# Patient Record
Sex: FEMALE | Race: WHITE | Employment: OTHER | URBAN - NONMETROPOLITAN AREA
[De-identification: names, ages, dates, MRNs, and addresses within clinical notes are randomized per-mention and may not be internally consistent; named-entity substitution may affect disease eponyms.]

---

## 2017-02-03 PROBLEM — M51.379 DEGENERATION OF LUMBAR OR LUMBOSACRAL INTERVERTEBRAL DISC: Chronic | Status: ACTIVE | Noted: 2017-02-03

## 2017-02-03 PROBLEM — M51.37 DEGENERATION OF LUMBAR OR LUMBOSACRAL INTERVERTEBRAL DISC: Chronic | Status: ACTIVE | Noted: 2017-02-03

## 2017-02-03 PROBLEM — M48.00 SPINAL STENOSIS: Chronic | Status: ACTIVE | Noted: 2017-02-03

## 2019-02-18 LAB
CREATININE: 1.2 MG/DL
POTASSIUM (K+): 4.5

## 2019-04-25 LAB
CHOLESTEROL, TOTAL: 181 MG/DL
CHOLESTEROL, TOTAL: 181 MG/DL
CHOLESTEROL/HDL RATIO: 4.3
CHOLESTEROL/HDL RATIO: 4.3
HDLC SERPL-MCNC: 42 MG/DL (ref 35–70)
HDLC SERPL-MCNC: 42 MG/DL (ref 35–70)
LDL CHOLESTEROL CALCULATED: 111 MG/DL (ref 0–160)
LDL CHOLESTEROL CALCULATED: 111 MG/DL (ref 0–160)
TRIGL SERPL-MCNC: 166 MG/DL
TRIGL SERPL-MCNC: 166 MG/DL
VLDLC SERPL CALC-MCNC: NORMAL MG/DL
VLDLC SERPL CALC-MCNC: NORMAL MG/DL

## 2019-05-15 ENCOUNTER — TELEPHONE (OUTPATIENT)
Dept: PRIMARY CARE CLINIC | Age: 74
End: 2019-05-15

## 2019-05-19 PROBLEM — J44.9 CHRONIC OBSTRUCTIVE LUNG DISEASE (HCC): Status: ACTIVE | Noted: 2018-07-09

## 2019-05-19 PROBLEM — E04.1 THYROID NODULE: Status: ACTIVE | Noted: 2019-05-19

## 2019-05-19 PROBLEM — M81.0 OSTEOPOROSIS: Status: ACTIVE | Noted: 2019-05-19

## 2019-05-19 PROBLEM — I10 HYPERTENSION: Status: ACTIVE | Noted: 2017-10-09

## 2019-05-19 PROBLEM — K21.9 GASTROESOPHAGEAL REFLUX DISEASE: Status: ACTIVE | Noted: 2019-04-25

## 2019-05-19 PROBLEM — C55 UTERINE CANCER (HCC): Status: ACTIVE | Noted: 2019-05-19

## 2019-05-19 RX ORDER — MIRTAZAPINE 15 MG/1
TABLET, FILM COATED ORAL
Refills: 0 | COMMUNITY
Start: 2019-03-19 | End: 2019-05-19

## 2019-05-19 RX ORDER — ASPIRIN 81 MG/1
TABLET ORAL
COMMUNITY

## 2019-05-19 RX ORDER — TRIHEXYPHENIDYL HYDROCHLORIDE 2 MG/1
TABLET ORAL
Refills: 2 | COMMUNITY
Start: 2019-03-18 | End: 2019-06-19 | Stop reason: ALTCHOICE

## 2019-05-19 RX ORDER — MISOPROSTOL 100 UG/1
TABLET ORAL
COMMUNITY
Start: 2018-03-07 | End: 2019-05-20

## 2019-05-19 NOTE — PROGRESS NOTES
2019    Name: Nichole Kohler : 1945 Sex: female  Age: 74 y.o. Subjective:  Chief Complaint   Patient presents with    Shortness of Breath    Injections     Prolia - unsure of last injection date. HPI: This 74y.o. -year-old female  presents today for evaluation and management of her  chronic medical problems. Current medication list reviewed. The patient is tolerating all medications well without adverse events or known side effects. The patient does understand the risk and benefits of the prescribed medications. She needs her Prolia shot she has a history of osteoporosis Needs her bone density scan after 2019. She sees Dr. Timothy Daniels her oncologist. Reviewed his note from earlier this year. She was treated for pneumonia in 2019. Chest x-ray revealed right middle lobe infiltrate. . Also referred to Dr. Selvin Ayala. Repeat chest x-ray on 5/15/2019 showed resolution of infiltrates but she had scarring and hyperinflation consistent with COPD. She says she is very short of breath when she gets up in the morning. She is afraid to fall sleep at night because she does note she could deal with the shortness of breath. She is on no inhalers. She did pulmonary function tests in the past but it has been a while n. She is not on any inhalers. She was told at one time that she had obstructive sleep apnea and was given a CPAP machine but was unable to tolerate the headgear and refuses to use it. She was told also told that her oxygen level was low at one time but she did not want to have a large oxygen tank at home. We'll need to get an overnight pulse oximetry test on her. EKG was found in the ED, questionable old anterior septal MI. No previous history of an old MI. Consider cardiology evaluation. We'll discuss this when she returns. O2 saturation room air is 93%     History of chronic pain under care of pain management.   history of depression under care of psychiatrist. She prescribes all her depression medications. Review of Systems       Current Outpatient Medications:     mirtazapine (REMERON) 15 MG tablet, Take 15 mg by mouth nightly, Disp: , Rfl:     lisinopril (PRINIVIL;ZESTRIL) 20 MG tablet, Take 1 tablet by mouth daily, Disp: 30 tablet, Rfl: 3    simvastatin (ZOCOR) 20 MG tablet, Take 0.5 tablets by mouth nightly, Disp: 30 tablet, Rfl: 5    vitamin D (ERGOCALCIFEROL) 14196 units CAPS capsule, Take 1 capsule by mouth once a week, Disp: 5 capsule, Rfl: 3    albuterol sulfate  (90 Base) MCG/ACT inhaler, Inhale 2 puffs into the lungs 4 times daily as needed for Wheezing, Disp: 3 Inhaler, Rfl: 1    Spacer/Aero-Holding Chambers (AEROCHAMBER MV) MISC, Use with your albuterol inhaler, Disp: 1 each, Rfl: 0    aspirin (ASPIRIN ADULT LOW DOSE) 81 MG EC tablet, , Disp: , Rfl:     trihexyphenidyl (ARTANE) 2 MG tablet, TK 1 T PO D, Disp: , Rfl: 2    HYDROcodone-acetaminophen (NORCO)  MG per tablet, Take 1 tablet by mouth every 8 hours as needed for Pain ., Disp: , Rfl:     escitalopram (LEXAPRO) 20 MG tablet, Take 20 mg by mouth 2 times daily , Disp: , Rfl:     denosumab (PROLIA) 60 MG/ML SOLN SC injection, Inject 60 mg into the skin once Every 6 months, Disp: , Rfl:     anastrozole (ARIMIDEX) 1 MG tablet, Take 1 mg by mouth nightly , Disp: , Rfl:     LORazepam (ATIVAN) 1 MG tablet, Take 1 mg by mouth 2 times daily. , Disp: , Rfl:      Allergies   Allergen Reactions    Epinephrine Other (See Comments)     PATIENT SATES \"HEART PALPATATIONS. \"     Adhesive Tape Rash        Past Medical History:   Diagnosis Date    Cancer ([facility] [address]      uterus    Cancer ([facility] [address]     breast bilateral    Chronic pain     Depression     GERD (gastroesophageal reflux disease)     Hyperlipidemia     Hypertension     Osteoarthritis     Osteoporosis     Palpitations     Sleep apnea     doesnt use her cpap       Health Maintenance Due   Topic Date Due    DTaP/Tdap/Td vaccine (1 - Tdap) normal.   Left Ear: External ear normal.   Nose: Nose normal.   Mouth/Throat: Oropharynx is clear and moist. No oropharyngeal exudate. Eyes: Pupils are equal, round, and reactive to light. Conjunctivae and EOM are normal. Right eye exhibits no discharge. Left eye exhibits no discharge. No scleral icterus. Neck: Normal range of motion. Neck supple. No thyromegaly present. Cardiovascular: Normal rate, regular rhythm, normal heart sounds and intact distal pulses. Exam reveals no gallop and no friction rub. No murmur heard. Pulmonary/Chest: Effort normal. No respiratory distress. She has decreased breath sounds. She has no wheezes. She has no rales. She exhibits no tenderness. Abdominal: Soft. Bowel sounds are normal. She exhibits no distension and no mass. There is no tenderness. There is no rebound and no guarding. Musculoskeletal: Normal range of motion. She exhibits no edema, tenderness or deformity. Lymphadenopathy:     She has no cervical adenopathy. Neurological: She is alert and oriented to person, place, and time. She displays normal reflexes. No cranial nerve deficit or sensory deficit. Skin: Skin is warm and dry. No rash noted. No erythema. No pallor. Psychiatric:   Extremely anxious        Last labs reviewed.       ASSESSMENT & PLAN :   Problem List        Circulatory    Hypertension    Relevant Medications    aspirin (ASPIRIN ADULT LOW DOSE) 81 MG EC tablet    lisinopril (PRINIVIL;ZESTRIL) 20 MG tablet    simvastatin (ZOCOR) 20 MG tablet       Respiratory    Chronic obstructive lung disease (HCC)     Pulmonary function tests, Proair hfa prn sob, use aerochamber  Overnight pulse oximetry test         Relevant Medications    albuterol sulfate  (90 Base) MCG/ACT inhaler    Other Relevant Orders    Stress Test, Pulmonary       Musculoskeletal and Integument    Osteoporosis - Primary    Relevant Medications    vitamin D (ERGOCALCIFEROL) 94040 units CAPS capsule       Other Depression    Relevant Medications    escitalopram (LEXAPRO) 20 MG tablet    LORazepam (ATIVAN) 1 MG tablet    mirtazapine (REMERON) 15 MG tablet    Chronic pain    Relevant Medications    escitalopram (LEXAPRO) 20 MG tablet    HYDROcodone-acetaminophen (NORCO)  MG per tablet    aspirin (ASPIRIN ADULT LOW DOSE) 81 MG EC tablet    mirtazapine (REMERON) 15 MG tablet    Hyperlipidemia    Relevant Medications    aspirin (ASPIRIN ADULT LOW DOSE) 81 MG EC tablet    lisinopril (PRINIVIL;ZESTRIL) 20 MG tablet    simvastatin (ZOCOR) 20 MG tablet           Return in about 1 week (around 5/27/2019).        Aline Gilford, DO  5/20/2019

## 2019-05-20 ENCOUNTER — OFFICE VISIT (OUTPATIENT)
Dept: PRIMARY CARE CLINIC | Age: 74
End: 2019-05-20
Payer: MEDICARE

## 2019-05-20 VITALS
HEART RATE: 103 BPM | DIASTOLIC BLOOD PRESSURE: 84 MMHG | BODY MASS INDEX: 25.49 KG/M2 | TEMPERATURE: 98.4 F | SYSTOLIC BLOOD PRESSURE: 128 MMHG | RESPIRATION RATE: 24 BRPM | HEIGHT: 65 IN | WEIGHT: 153 LBS | OXYGEN SATURATION: 93 %

## 2019-05-20 DIAGNOSIS — I10 ESSENTIAL HYPERTENSION: ICD-10-CM

## 2019-05-20 DIAGNOSIS — G47.30 SLEEP APNEA, UNSPECIFIED TYPE: ICD-10-CM

## 2019-05-20 DIAGNOSIS — J44.9 CHRONIC OBSTRUCTIVE PULMONARY DISEASE, UNSPECIFIED COPD TYPE (HCC): ICD-10-CM

## 2019-05-20 DIAGNOSIS — M81.0 OSTEOPOROSIS WITHOUT CURRENT PATHOLOGICAL FRACTURE, UNSPECIFIED OSTEOPOROSIS TYPE: Primary | ICD-10-CM

## 2019-05-20 DIAGNOSIS — F32.9 MAJOR DEPRESSIVE DISORDER, REMISSION STATUS UNSPECIFIED, UNSPECIFIED WHETHER RECURRENT: ICD-10-CM

## 2019-05-20 DIAGNOSIS — G89.4 CHRONIC PAIN SYNDROME: ICD-10-CM

## 2019-05-20 DIAGNOSIS — E78.2 MIXED HYPERLIPIDEMIA: ICD-10-CM

## 2019-05-20 DIAGNOSIS — F32.A DEPRESSION, UNSPECIFIED DEPRESSION TYPE: ICD-10-CM

## 2019-05-20 PROCEDURE — 96372 THER/PROPH/DIAG INJ SC/IM: CPT | Performed by: INTERNAL MEDICINE

## 2019-05-20 PROCEDURE — 99214 OFFICE O/P EST MOD 30 MIN: CPT | Performed by: INTERNAL MEDICINE

## 2019-05-20 RX ORDER — LISINOPRIL 20 MG/1
20 TABLET ORAL DAILY
Qty: 30 TABLET | Refills: 3 | Status: SHIPPED | OUTPATIENT
Start: 2019-05-20 | End: 2019-10-23 | Stop reason: SDUPTHER

## 2019-05-20 RX ORDER — SIMVASTATIN 20 MG
10 TABLET ORAL NIGHTLY
Qty: 30 TABLET | Refills: 5 | Status: SHIPPED | OUTPATIENT
Start: 2019-05-20 | End: 2019-06-06 | Stop reason: DRUGHIGH

## 2019-05-20 RX ORDER — ERGOCALCIFEROL 1.25 MG/1
50000 CAPSULE ORAL WEEKLY
Qty: 5 CAPSULE | Refills: 3 | Status: SHIPPED | OUTPATIENT
Start: 2019-05-20 | End: 2019-09-17 | Stop reason: DRUGHIGH

## 2019-05-20 RX ORDER — INHALER, ASSIST DEVICES
SPACER (EA) MISCELLANEOUS
Qty: 1 EACH | Refills: 0 | Status: SHIPPED | OUTPATIENT
Start: 2019-05-20 | End: 2019-10-23

## 2019-05-20 RX ORDER — ALBUTEROL SULFATE 90 UG/1
2 AEROSOL, METERED RESPIRATORY (INHALATION) 4 TIMES DAILY PRN
Qty: 3 INHALER | Refills: 1 | Status: SHIPPED
Start: 2019-05-20 | End: 2020-02-27

## 2019-05-20 RX ORDER — MIRTAZAPINE 15 MG/1
15 TABLET, FILM COATED ORAL NIGHTLY
COMMUNITY
End: 2019-06-19 | Stop reason: ALTCHOICE

## 2019-05-20 NOTE — ASSESSMENT & PLAN NOTE
Probably given today and will return in 6 months for another injection. DEXA scan due in August 2019.

## 2019-05-20 NOTE — ASSESSMENT & PLAN NOTE
Blood pressures are stable. Continue medications and monitor blood pressures at home. Call office if systolics are over 470 over diastolics over 90.

## 2019-05-21 ENCOUNTER — TELEPHONE (OUTPATIENT)
Dept: PRIMARY CARE CLINIC | Age: 74
End: 2019-05-21

## 2019-05-21 NOTE — TELEPHONE ENCOUNTER
Patient is not on oxygen. I would like an overnight pulse oximeter on room air to see if she desaturates during the night.

## 2019-05-21 NOTE — TELEPHONE ENCOUNTER
[facility] CALLED- THEY WANT CLARIFICATION ON ORDERS FOR OXYGEN.  SHE NEED TO KNOW IF SHE IS ON OXYGEN THERAPY ALREADY AND WHAT FLOW YOU WOUKD LIKE HER ON, CONTINUOUS??

## 2019-05-22 ENCOUNTER — TELEPHONE (OUTPATIENT)
Dept: PRIMARY CARE CLINIC | Age: 74
End: 2019-05-22

## 2019-05-22 NOTE — TELEPHONE ENCOUNTER
Dr Walter Sanford,  I need you to order a pulmonary function test for this patient. (The pulmonary stress test is incorrect.)  Also, [facility] does not do the pulse oximetry overnight tests. Do you know of who does these?

## 2019-05-23 ENCOUNTER — TELEPHONE (OUTPATIENT)
Dept: PRIMARY CARE CLINIC | Age: 74
End: 2019-05-23

## 2019-05-23 DIAGNOSIS — R06.09 DYSPNEA ON EXERTION: Primary | ICD-10-CM

## 2019-05-23 NOTE — TELEPHONE ENCOUNTER
[facility] scheduling called and the stress test ordered says pulmonary, they are wondering if it is for a PFT or just a standard walking treadmill. They will need new order with corrected orders.

## 2019-06-06 ENCOUNTER — OFFICE VISIT (OUTPATIENT)
Dept: PRIMARY CARE CLINIC | Age: 74
End: 2019-06-06
Payer: MEDICARE

## 2019-06-06 VITALS
WEIGHT: 139 LBS | SYSTOLIC BLOOD PRESSURE: 110 MMHG | DIASTOLIC BLOOD PRESSURE: 62 MMHG | HEART RATE: 73 BPM | BODY MASS INDEX: 23.16 KG/M2 | HEIGHT: 65 IN | TEMPERATURE: 97.3 F | OXYGEN SATURATION: 97 %

## 2019-06-06 DIAGNOSIS — Z78.0 POST-MENOPAUSAL: Primary | ICD-10-CM

## 2019-06-06 DIAGNOSIS — F32.1 CURRENT MODERATE EPISODE OF MAJOR DEPRESSIVE DISORDER, UNSPECIFIED WHETHER RECURRENT (HCC): ICD-10-CM

## 2019-06-06 DIAGNOSIS — J96.11 CHRONIC RESPIRATORY FAILURE WITH HYPOXIA (HCC): ICD-10-CM

## 2019-06-06 PROCEDURE — 99214 OFFICE O/P EST MOD 30 MIN: CPT | Performed by: INTERNAL MEDICINE

## 2019-06-06 PROCEDURE — G0444 DEPRESSION SCREEN ANNUAL: HCPCS | Performed by: INTERNAL MEDICINE

## 2019-06-06 RX ORDER — SIMVASTATIN 10 MG
10 TABLET ORAL NIGHTLY
Qty: 90 TABLET | Refills: 1 | Status: SHIPPED | OUTPATIENT
Start: 2019-06-06 | End: 2019-06-06 | Stop reason: SDUPTHER

## 2019-06-06 RX ORDER — SIMVASTATIN 10 MG
10 TABLET ORAL NIGHTLY
Qty: 90 TABLET | Refills: 1 | Status: SHIPPED | OUTPATIENT
Start: 2019-06-06 | End: 2019-06-06

## 2019-06-06 RX ORDER — SIMVASTATIN 10 MG
10 TABLET ORAL NIGHTLY
Qty: 90 TABLET | Refills: 1 | Status: SHIPPED | OUTPATIENT
Start: 2019-06-06 | End: 2019-06-19 | Stop reason: SDUPTHER

## 2019-06-06 ASSESSMENT — PATIENT HEALTH QUESTIONNAIRE - PHQ9
10. IF YOU CHECKED OFF ANY PROBLEMS, HOW DIFFICULT HAVE THESE PROBLEMS MADE IT FOR YOU TO DO YOUR WORK, TAKE CARE OF THINGS AT HOME, OR GET ALONG WITH OTHER PEOPLE: 3
SUM OF ALL RESPONSES TO PHQ QUESTIONS 1-9: 19
4. FEELING TIRED OR HAVING LITTLE ENERGY: 3
SUM OF ALL RESPONSES TO PHQ9 QUESTIONS 1 & 2: 4
2. FEELING DOWN, DEPRESSED OR HOPELESS: 3
7. TROUBLE CONCENTRATING ON THINGS, SUCH AS READING THE NEWSPAPER OR WATCHING TELEVISION: 3
8. MOVING OR SPEAKING SO SLOWLY THAT OTHER PEOPLE COULD HAVE NOTICED. OR THE OPPOSITE, BEING SO FIGETY OR RESTLESS THAT YOU HAVE BEEN MOVING AROUND A LOT MORE THAN USUAL: 3
SUM OF ALL RESPONSES TO PHQ QUESTIONS 1-9: 19
3. TROUBLE FALLING OR STAYING ASLEEP: 0
1. LITTLE INTEREST OR PLEASURE IN DOING THINGS: 1
5. POOR APPETITE OR OVEREATING: 3
6. FEELING BAD ABOUT YOURSELF - OR THAT YOU ARE A FAILURE OR HAVE LET YOURSELF OR YOUR FAMILY DOWN: 3
9. THOUGHTS THAT YOU WOULD BE BETTER OFF DEAD, OR OF HURTING YOURSELF: 0

## 2019-06-06 ASSESSMENT — ENCOUNTER SYMPTOMS
ANAL BLEEDING: 0
ABDOMINAL PAIN: 0
COUGH: 0
SHORTNESS OF BREATH: 1
SORE THROAT: 0
EYE DISCHARGE: 0
BLOOD IN STOOL: 0
CONSTIPATION: 0
DIARRHEA: 0
WHEEZING: 0
RHINORRHEA: 0
STRIDOR: 0
EYE ITCHING: 0
VOMITING: 0
PHOTOPHOBIA: 0
FACIAL SWELLING: 0
COLOR CHANGE: 0
EYE PAIN: 0
TROUBLE SWALLOWING: 0
NAUSEA: 0

## 2019-06-06 NOTE — PROGRESS NOTES
6/6/19    Name: Vanna Dumont     [facility]:[id number]      Sex:female       Age : 74 y.o. Chief Complaint   Patient presents with    Osteoporosis     6 week F/U         HPI     Vahid Snowden is here accompanied by her . Her  states that her depression has worsened. She refuses to follow instructions. We talked about getting home oxygen for nighttime use and when necessary during the day but she is adamant that she does not want a large tank and she wants an [employer] device. She could not seem to understand that this runs in the batteries this is mainly used for outside trips but not for nighttime use. . She has absolutely refused to get the sleep apnea study at an outside facility so we recommended that she do a home sleep study. Her . Says she is getting much more agitated. Saw psychiatrist recently but no changes were made in her medications. They were told to \"pray over this\"  I asked her if she had any thoughts of suicide and she said that she did however she had no plans. I strongly recommend that she be admitted to the [facility] psych unit for further evaluation and treatment. Her  is to take her immediately to [facility] emergency department. .    Review of Systems   Constitutional: Negative for appetite change, fatigue and unexpected weight change. HENT: Negative for congestion, ear pain, facial swelling, rhinorrhea, sore throat, tinnitus and trouble swallowing. Eyes: Negative for photophobia, pain, discharge, itching and visual disturbance. Respiratory: Positive for shortness of breath. Negative for cough, wheezing and stridor. Cardiovascular: Negative for chest pain, palpitations and leg swelling. Gastrointestinal: Negative for abdominal pain, anal bleeding, blood in stool, constipation, diarrhea, nausea and vomiting. Endocrine: Negative for cold intolerance, heat intolerance, polydipsia, polyphagia and polyuria.    Genitourinary: Negative for difficulty urinating, dysuria, flank pain, frequency, hematuria and urgency. Musculoskeletal: Negative for arthralgias, gait problem, joint swelling and myalgias. Skin: Negative for color change, pallor and rash. Allergic/Immunologic: Negative for environmental allergies and food allergies. Neurological: Negative for dizziness, tremors, seizures, syncope, speech difficulty, weakness, light-headedness, numbness and headaches. Hematological: Negative for adenopathy. Does not bruise/bleed easily. Psychiatric/Behavioral: Positive for agitation, behavioral problems, decreased concentration, sleep disturbance and suicidal ideas. Negative for confusion.           Current Outpatient Medications:     simvastatin (ZOCOR) 10 MG tablet, Take 1 tablet by mouth nightly, Disp: 90 tablet, Rfl: 1    mirtazapine (REMERON) 15 MG tablet, Take 15 mg by mouth nightly, Disp: , Rfl:     lisinopril (PRINIVIL;ZESTRIL) 20 MG tablet, Take 1 tablet by mouth daily, Disp: 30 tablet, Rfl: 3    vitamin D (ERGOCALCIFEROL) 26155 units CAPS capsule, Take 1 capsule by mouth once a week, Disp: 5 capsule, Rfl: 3    albuterol sulfate  (90 Base) MCG/ACT inhaler, Inhale 2 puffs into the lungs 4 times daily as needed for Wheezing, Disp: 3 Inhaler, Rfl: 1    Spacer/Aero-Holding Chambers (AEROCHAMBER MV) MISC, Use with your albuterol inhaler, Disp: 1 each, Rfl: 0    aspirin (ASPIRIN ADULT LOW DOSE) 81 MG EC tablet, , Disp: , Rfl:     trihexyphenidyl (ARTANE) 2 MG tablet, TK 1 T PO D, Disp: , Rfl: 2    HYDROcodone-acetaminophen (NORCO)  MG per tablet, Take 1 tablet by mouth every 8 hours as needed for Pain ., Disp: , Rfl:     escitalopram (LEXAPRO) 20 MG tablet, Take 20 mg by mouth 2 times daily , Disp: , Rfl:     denosumab (PROLIA) 60 MG/ML SOLN SC injection, Inject 60 mg into the skin once Every 6 months, Disp: , Rfl:     anastrozole (ARIMIDEX) 1 MG tablet, Take 1 mg by mouth nightly , Disp: , Rfl:     LORazepam (ATIVAN) 1 MG tablet, Take 1 mg by mouth reveals no gallop and no friction rub. No murmur heard. Pulmonary/Chest: Effort normal and breath sounds normal. No respiratory distress. She has no wheezes. She has no rales. She exhibits no tenderness. Abdominal: Soft. Bowel sounds are normal. She exhibits no distension and no mass. There is no tenderness. There is no rebound and no guarding. Musculoskeletal: Normal range of motion. She exhibits no edema, tenderness or deformity. Lymphadenopathy:     She has no cervical adenopathy. Neurological: She is alert and oriented to person, place, and time. She displays normal reflexes. No cranial nerve deficit or sensory deficit. Skin: Skin is warm and dry. No rash noted. No erythema. No pallor. Psychiatric:   She is agitated. Judgment is impaired. Her affect is extremely flat. Problem List        Respiratory    Chronic respiratory failure with hypoxia ([address]     When discharged from psych unit will need home oxygen for nighttime use. Overnight pulse oximetry showed that she  significantly desaturated throughout the night. Other    Depression     I called emergency department at [facility] and talked to the nurse regarding this patient. They will evaluate her and see if she is a candidate for the [facility] psych unit. I informed them that she is having suicidal thoughts.  I will see her after discharge         Relevant Medications    escitalopram (LEXAPRO) 20 MG tablet    LORazepam (ATIVAN) 1 MG tablet    mirtazapine (REMERON) 15 MG tablet    Other Relevant Orders    External Referral To Psychology        Go to ED at [facility]  Will see you after discharge    Seen by:   Yasmeen Guido DO

## 2019-06-06 NOTE — ASSESSMENT & PLAN NOTE
I called emergency department at [facility] and talked to the nurse regarding this patient. They will evaluate her and see if she is a candidate for the [facility] psych unit. I informed them that she is having suicidal thoughts.  I will see her after discharge

## 2019-06-06 NOTE — ASSESSMENT & PLAN NOTE
When discharged from psych unit will need home oxygen for nighttime use. Overnight pulse oximetry showed that she  significantly desaturated throughout the night.

## 2019-06-07 ENCOUNTER — TELEPHONE (OUTPATIENT)
Dept: PRIMARY CARE CLINIC | Age: 74
End: 2019-06-07

## 2019-06-07 NOTE — TELEPHONE ENCOUNTER
Aj Lovett says you instructed her  to take her to the ER yesterday at [id number]. He did, and after waiting there for 5 hours, they told Aj Lovett they had no bed and sent her home. They say they will call her when a bed opens up, but that may not be until Monday. Her  says she is doing better now, but is concerned. I instructed him to call Dr Loren Jackson, the psych doctor you referred her to, ASAP, as sometimes getting a new pt appointment can take awhile. Any other suggestions?

## 2019-06-10 ENCOUNTER — TELEPHONE (OUTPATIENT)
Dept: PRIMARY CARE CLINIC | Age: 74
End: 2019-06-10

## 2019-06-10 DIAGNOSIS — J44.9 CHRONIC OBSTRUCTIVE PULMONARY DISEASE, UNSPECIFIED COPD TYPE (HCC): ICD-10-CM

## 2019-06-10 NOTE — TELEPHONE ENCOUNTER
They can wait for a bed earlier this week.  They may be able to get her in sooner than she can see Dr Ender Duran

## 2019-06-18 VITALS
BODY MASS INDEX: 25.66 KG/M2 | DIASTOLIC BLOOD PRESSURE: 62 MMHG | HEART RATE: 70 BPM | SYSTOLIC BLOOD PRESSURE: 122 MMHG | HEIGHT: 65 IN | WEIGHT: 154 LBS

## 2019-06-19 ENCOUNTER — OFFICE VISIT (OUTPATIENT)
Dept: PRIMARY CARE CLINIC | Age: 74
End: 2019-06-19
Payer: MEDICARE

## 2019-06-19 ENCOUNTER — TELEPHONE (OUTPATIENT)
Dept: PRIMARY CARE CLINIC | Age: 74
End: 2019-06-19

## 2019-06-19 ENCOUNTER — HOSPITAL ENCOUNTER (OUTPATIENT)
Age: 74
Discharge: HOME | End: 2019-06-21
Payer: MEDICARE

## 2019-06-19 VITALS
DIASTOLIC BLOOD PRESSURE: 64 MMHG | HEIGHT: 66 IN | OXYGEN SATURATION: 94 % | SYSTOLIC BLOOD PRESSURE: 114 MMHG | TEMPERATURE: 97.8 F | RESPIRATION RATE: 18 BRPM | HEART RATE: 88 BPM | BODY MASS INDEX: 24.59 KG/M2 | WEIGHT: 153 LBS

## 2019-06-19 DIAGNOSIS — E04.1 THYROID NODULE: ICD-10-CM

## 2019-06-19 DIAGNOSIS — I10 ESSENTIAL HYPERTENSION: ICD-10-CM

## 2019-06-19 DIAGNOSIS — Z23 NEED FOR TETANUS, DIPHTHERIA, AND ACELLULAR PERTUSSIS (TDAP) VACCINE: ICD-10-CM

## 2019-06-19 DIAGNOSIS — F33.2 SEVERE EPISODE OF RECURRENT MAJOR DEPRESSIVE DISORDER, WITHOUT PSYCHOTIC FEATURES (HCC): Primary | ICD-10-CM

## 2019-06-19 DIAGNOSIS — E78.2 MIXED HYPERLIPIDEMIA: ICD-10-CM

## 2019-06-19 DIAGNOSIS — M81.6 LOCALIZED OSTEOPOROSIS WITHOUT CURRENT PATHOLOGICAL FRACTURE: ICD-10-CM

## 2019-06-19 DIAGNOSIS — G47.34 HYPOXIA, SLEEP RELATED: ICD-10-CM

## 2019-06-19 DIAGNOSIS — J43.9 PULMONARY EMPHYSEMA, UNSPECIFIED EMPHYSEMA TYPE (HCC): ICD-10-CM

## 2019-06-19 LAB
T4 FREE: 1.17 NG/DL (ref 0.93–1.7)
TSH SERPL DL<=0.05 MIU/L-ACNC: 1.76 UIU/ML (ref 0.27–4.2)

## 2019-06-19 PROCEDURE — 36415 COLL VENOUS BLD VENIPUNCTURE: CPT

## 2019-06-19 PROCEDURE — 84439 ASSAY OF FREE THYROXINE: CPT

## 2019-06-19 PROCEDURE — 84443 ASSAY THYROID STIM HORMONE: CPT

## 2019-06-19 PROCEDURE — 99215 OFFICE O/P EST HI 40 MIN: CPT | Performed by: INTERNAL MEDICINE

## 2019-06-19 RX ORDER — CITALOPRAM 20 MG/1
20 TABLET ORAL DAILY
COMMUNITY
End: 2020-01-22

## 2019-06-19 RX ORDER — SIMVASTATIN 10 MG
10 TABLET ORAL NIGHTLY
Qty: 30 TABLET | Refills: 3 | Status: SHIPPED | OUTPATIENT
Start: 2019-06-19 | End: 2019-06-19 | Stop reason: SDUPTHER

## 2019-06-19 RX ORDER — MIRTAZAPINE 30 MG/1
30 TABLET, FILM COATED ORAL NIGHTLY
COMMUNITY
End: 2020-10-27 | Stop reason: ALTCHOICE

## 2019-06-19 RX ORDER — SIMVASTATIN 10 MG
10 TABLET ORAL NIGHTLY
Qty: 90 TABLET | Refills: 3 | Status: SHIPPED | OUTPATIENT
Start: 2019-06-19 | End: 2020-02-27 | Stop reason: SDUPTHER

## 2019-06-19 RX ORDER — TRAZODONE HYDROCHLORIDE 50 MG/1
25 TABLET ORAL NIGHTLY
COMMUNITY
End: 2020-10-27 | Stop reason: ALTCHOICE

## 2019-06-19 RX ORDER — LORAZEPAM 0.5 MG/1
1 TABLET ORAL 3 TIMES DAILY
COMMUNITY
End: 2021-02-08 | Stop reason: ALTCHOICE

## 2019-06-19 ASSESSMENT — ENCOUNTER SYMPTOMS
TROUBLE SWALLOWING: 0
EYE DISCHARGE: 0
SHORTNESS OF BREATH: 0
NAUSEA: 0
ANAL BLEEDING: 0
STRIDOR: 0
CONSTIPATION: 0
VOMITING: 0
BLOOD IN STOOL: 0
FACIAL SWELLING: 0
DIARRHEA: 0
EYE PAIN: 0
COUGH: 0
WHEEZING: 0
BACK PAIN: 1
SORE THROAT: 0
PHOTOPHOBIA: 0
ABDOMINAL PAIN: 0
COLOR CHANGE: 0
EYE ITCHING: 0
RHINORRHEA: 0

## 2019-06-19 NOTE — PROGRESS NOTES
2019    Name: Astrid Villatoro : 1945 Sex: female  Age: 74 y.o. Subjective:  Chief Complaint   Patient presents with    Follow-Up from [facility] Results     Overnight pulse ox    Medication Refill     patient wants all writeen prescriptions - no escribing. This 74y.o. -year-old female  presents today for evaluation and management of her  chronic medical problems. Current medication list reviewed. The patient is tolerating all medications well without adverse events or known side effects. The patient does understand the risk and benefits of the prescribed medications. Patient was hospitalized at the  Psychiatric Unit at [facility].  She was admitted 2019 and discharged 6/15/2019. She was admitted for severe depression and suicidal ideations. Reviewed discharge summary. Medication reconciliation was done. She has done quite well since discharge and has an appointment with her psychiatrist Dr Viviana Steiner. later this month. Lexapro was decreased to 20 mg a day. Lorazepam was changed to 0.5 mg 3 times daily. Trazodone was added at half of the 50 mg equivalent to 25 mg daily at bedtime. Mirtazapine was increased to 30 mg at bedtime. She was started on an albuterol MDI 2 puffs twice daily as needed. She was continued on her other medications and supplements. She no longer has any suicidal ideations. Overnight pulse oximetry was done at [facility] by Karin sam [facility].  She had nocturnal hypoxia which qualified her for bedtime oxygen. Spirometry test was also done and it was read out as normal.  Over 10 years ago she was diagnosed with sleep apnea was unable to tolerate CPAP and never used it. Hopefully the nocturnal oxygen will help her. She is not willing at this time to undergo a repeat sleep study    Patient has history of a thyroid nodule was has not been checked recently.   We will set her up for an ultrasound of her thyroid check a TSH and free T4. She has a history of osteoporosis and receives Prolia every 6 months, last time she got it was in May 2019. Last bone density scan was done over 2 years ago. We will check a DEXA scan. She has a history of chronic back pain. She is under the care of pain management who has her on Norco.  She has a history of hypertension and is doing well on present dose of lisinopril 20 mg a day. History of hyperlipidemia and her lipids are good. She will stay on simvastatin 10 mg at bedtime. .    Review of Systems   Constitutional: Negative for appetite change, fatigue and unexpected weight change. HENT: Negative for congestion, ear pain, facial swelling, rhinorrhea, sore throat, tinnitus and trouble swallowing. Eyes: Negative for photophobia, pain, discharge, itching and visual disturbance. Respiratory: Negative for cough, shortness of breath, wheezing and stridor. Cardiovascular: Negative for chest pain, palpitations and leg swelling. Gastrointestinal: Negative for abdominal pain, anal bleeding, blood in stool, constipation, diarrhea, nausea and vomiting. Endocrine: Negative for cold intolerance, heat intolerance, polydipsia, polyphagia and polyuria. Genitourinary: Negative for difficulty urinating, dysuria, flank pain, frequency, hematuria and urgency. Musculoskeletal: Positive for arthralgias and back pain. Negative for gait problem, joint swelling and myalgias. Skin: Negative for color change, pallor and rash. Allergic/Immunologic: Negative for environmental allergies and food allergies. Neurological: Negative for dizziness, tremors, seizures, syncope, speech difficulty, weakness, light-headedness, numbness and headaches. Hematological: Negative for adenopathy. Does not bruise/bleed easily. Psychiatric/Behavioral: Negative for agitation, behavioral problems, confusion, sleep disturbance and suicidal ideas. The patient is nervous/anxious.            Current Outpatient Medications:     traZODone (DESYREL) 50 MG tablet, Take 25 mg by mouth nightly, Disp: , Rfl:     citalopram (CELEXA) 20 MG tablet, Take 20 mg by mouth daily, Disp: , Rfl:     mirtazapine (REMERON) 30 MG tablet, Take 30 mg by mouth nightly, Disp: , Rfl:     LORazepam (ATIVAN) 0.5 MG tablet, Take 0.5 mg by mouth 3 times daily. , Disp: , Rfl:     simvastatin (ZOCOR) 10 MG tablet, Take 1 tablet by mouth nightly, Disp: 90 tablet, Rfl: 3    lisinopril (PRINIVIL;ZESTRIL) 20 MG tablet, Take 1 tablet by mouth daily, Disp: 30 tablet, Rfl: 3    vitamin D (ERGOCALCIFEROL) 94527 units CAPS capsule, Take 1 capsule by mouth once a week, Disp: 5 capsule, Rfl: 3    albuterol sulfate  (90 Base) MCG/ACT inhaler, Inhale 2 puffs into the lungs 4 times daily as needed for Wheezing, Disp: 3 Inhaler, Rfl: 1    Spacer/Aero-Holding Chambers (AEROCHAMBER MV) MISC, Use with your albuterol inhaler, Disp: 1 each, Rfl: 0    aspirin (ASPIRIN ADULT LOW DOSE) 81 MG EC tablet, , Disp: , Rfl:     HYDROcodone-acetaminophen (NORCO)  MG per tablet, Take 1 tablet by mouth every 8 hours as needed for Pain ., Disp: , Rfl:     denosumab (PROLIA) 60 MG/ML SOLN SC injection, Inject 60 mg into the skin once Every 6 months, Disp: , Rfl:     anastrozole (ARIMIDEX) 1 MG tablet, Take 1 mg by mouth nightly , Disp: , Rfl:      Allergies   Allergen Reactions    Epinephrine Other (See Comments)     PATIENT SATES \"HEART PALPATATIONS. \"     Adhesive Tape Rash        Past Medical History:   Diagnosis Date    Cancer [facility])      uterus    Cancer ([facility] [address]     breast bilateral    Chronic pain     Chronic respiratory failure with hypoxia ([facility] [address] 6/6/2019    Depression     GERD (gastroesophageal reflux disease)     Hyperlipidemia     Hypertension     Osteoarthritis     Osteoporosis     Palpitations     Sleep apnea     doesnt use her cpap       Health Maintenance Due   Topic Date Due    Shingles Vaccine (1 of 2) 10/10/1993    DTaP/Tdap/Td vaccine (1 - Tdap) 2003    Potassium monitoring  2018    Creatinine monitoring  2018        Patient Active Problem List   Diagnosis    Low back pain    Degeneration of lumbar or lumbosacral intervertebral disc    Spinal stenosis    Depression    Chronic pain    Breast cancer (HCC)    Chronic obstructive lung disease ([address]    Gastroesophageal reflux disease    Hyperlipidemia    Hypertension    Osteoporosis    Personal history of breast cancer    Sleep apnea    Thyroid nodule    Uterine cancer ([address]    Chronic respiratory failure with hypoxia (HCC)    Hypoxia, sleep related    Need for tetanus, diphtheria, and acellular pertussis (Tdap) vaccine    Severe episode of recurrent major depressive disorder, without psychotic features ([facility] [address]        Past Surgical History:   Procedure Laterality Date    BACK SURGERY  2017    T7 spinal cord stimulator with Dr. Sarahi Randolph      breast reconstruction w/implants    COLONOSCOPY      COSMETIC SURGERY      jaw surgery    ENDOSCOPY, COLON, DIAGNOSTIC      HYSTERECTOMY      complete    MASTECTOMY  ,     left and right    OTHER SURGICAL HISTORY N/A 2017    stage 1  5 day spinal cord stimulator trial [employer]    SKIN BIOPSY      moles    TONSILLECTOMY          Family History   Problem Relation Age of Onset    No Known Problems Mother     No Known Problems Father         Social History     Tobacco Use    Smoking status: Former Smoker     Packs/day: 1.00     Years: 12.00     Pack years: 12.00     Types: Cigarettes     Start date: 1965     Last attempt to quit: 1974     Years since quittin.0    Smokeless tobacco: Never Used   Substance Use Topics    Alcohol use: No    Drug use: Yes     Comment: Norco, Lorazepam        Objective  Vitals:    19 0852   BP: 114/64   Site: Right Upper Arm   Position: Sitting   Cuff Size: Medium Adult   Pulse: 88   Resp: 18   Temp: 97.8 °F (36.6 °C)   TempSrc: Temporal   SpO2: 94%   Weight: 153 lb (69.4 kg)   Height: 5' 5.5\" (1.664 m)        Exam:  Physical Exam   Constitutional: She is oriented to person, place, and time. She appears well-developed and well-nourished. HENT:   Head: Normocephalic. Right Ear: External ear normal.   Left Ear: External ear normal.   Nose: Nose normal.   Mouth/Throat: Oropharynx is clear and moist. No oropharyngeal exudate. Eyes: Pupils are equal, round, and reactive to light. Conjunctivae and EOM are normal. Right eye exhibits no discharge. Left eye exhibits no discharge. No scleral icterus. Neck: Normal range of motion. Neck supple. No thyromegaly present. Small nodule   Cardiovascular: Normal rate, regular rhythm, normal heart sounds and intact distal pulses. Exam reveals no gallop and no friction rub. No murmur heard. Pulmonary/Chest: Effort normal and breath sounds normal. No respiratory distress. She has no wheezes. She has no rales. She exhibits no tenderness. Abdominal: Soft. Bowel sounds are normal. She exhibits no distension and no mass. There is no tenderness. There is no rebound and no guarding. Musculoskeletal: Normal range of motion. She exhibits no edema, tenderness or deformity. Lymphadenopathy:     She has no cervical adenopathy. Neurological: She is alert and oriented to person, place, and time. She displays normal reflexes. No cranial nerve deficit or sensory deficit. Skin: Skin is warm and dry. No rash noted. No erythema. No pallor. Psychiatric: She has a normal mood and affect. Her behavior is normal. Judgment and thought content normal.   Calmer than previous examination. She still has some mild intention tremor   Vitals reviewed. Last labs reviewed. ASSESSMENT & PLAN :   Problem List        Circulatory    Hypertension     Blood pressures are stable. Continue medications and monitor blood pressures at home.  Call office if systolics are over 036 over diastolics over

## 2019-06-28 DIAGNOSIS — E04.1 THYROID NODULE: ICD-10-CM

## 2019-07-16 VITALS
BODY MASS INDEX: 25.66 KG/M2 | HEIGHT: 65 IN | WEIGHT: 154 LBS | HEART RATE: 70 BPM | DIASTOLIC BLOOD PRESSURE: 62 MMHG | SYSTOLIC BLOOD PRESSURE: 122 MMHG

## 2019-07-16 RX ORDER — OLANZAPINE 2.5 MG/1
2.5 TABLET ORAL NIGHTLY
COMMUNITY
End: 2020-02-27

## 2019-07-16 RX ORDER — ESCITALOPRAM OXALATE 20 MG/1
20 TABLET ORAL DAILY
COMMUNITY
End: 2020-02-27

## 2019-07-16 RX ORDER — MISOPROSTOL 100 UG/1
100 TABLET ORAL 4 TIMES DAILY
COMMUNITY
End: 2020-02-27

## 2019-07-17 ENCOUNTER — OFFICE VISIT (OUTPATIENT)
Dept: PRIMARY CARE CLINIC | Age: 74
End: 2019-07-17
Payer: MEDICARE

## 2019-07-17 VITALS
OXYGEN SATURATION: 95 % | WEIGHT: 153 LBS | SYSTOLIC BLOOD PRESSURE: 116 MMHG | RESPIRATION RATE: 18 BRPM | DIASTOLIC BLOOD PRESSURE: 68 MMHG | HEART RATE: 73 BPM | TEMPERATURE: 98.3 F | BODY MASS INDEX: 25.49 KG/M2 | HEIGHT: 65 IN

## 2019-07-17 DIAGNOSIS — R10.30 LOWER ABDOMINAL PAIN: Primary | ICD-10-CM

## 2019-07-17 DIAGNOSIS — G47.30 SLEEP APNEA, UNSPECIFIED TYPE: ICD-10-CM

## 2019-07-17 DIAGNOSIS — K59.03 DRUG-INDUCED CONSTIPATION: ICD-10-CM

## 2019-07-17 DIAGNOSIS — G47.34 HYPOXIA, SLEEP RELATED: ICD-10-CM

## 2019-07-17 DIAGNOSIS — I10 ESSENTIAL HYPERTENSION: ICD-10-CM

## 2019-07-17 PROCEDURE — 99214 OFFICE O/P EST MOD 30 MIN: CPT | Performed by: INTERNAL MEDICINE

## 2019-07-17 ASSESSMENT — ENCOUNTER SYMPTOMS
RHINORRHEA: 0
BLOOD IN STOOL: 0
VOMITING: 0
EYE DISCHARGE: 0
EYE ITCHING: 0
CONSTIPATION: 1
ANAL BLEEDING: 0
SHORTNESS OF BREATH: 1
WHEEZING: 0
EYE PAIN: 0
FACIAL SWELLING: 0
SORE THROAT: 0
ABDOMINAL PAIN: 1
PHOTOPHOBIA: 0
COLOR CHANGE: 0
TROUBLE SWALLOWING: 0
DIARRHEA: 0
COUGH: 0
NAUSEA: 1
STRIDOR: 0

## 2019-07-17 NOTE — PROGRESS NOTES
supple. No thyromegaly present. Cardiovascular: Normal rate, regular rhythm, normal heart sounds and intact distal pulses. Exam reveals no gallop and no friction rub. No murmur heard. Pulmonary/Chest: Effort normal and breath sounds normal. No respiratory distress. She has no wheezes. She has no rales. She exhibits no tenderness. Abdominal: Soft. Bowel sounds are normal. She exhibits no distension and no mass. There is no tenderness. There is no rebound and no guarding. Musculoskeletal: Normal range of motion. She exhibits no edema, tenderness or deformity. Lymphadenopathy:     She has no cervical adenopathy. Neurological: She is alert and oriented to person, place, and time. She displays normal reflexes. No cranial nerve deficit or sensory deficit. Skin: Skin is warm and dry. Appears anxious. No rash noted. No erythema. No pallor. Psychiatric: She has a normal mood and affect. Her behavior is normal.        Last labs reviewed. ASSESSMENT & PLAN :   Problem List        Circulatory    Hypertension     Blood pressures are stable. Continue medications and monitor blood pressures at home. Call office if systolics are over 625 over diastolics over 90. Relevant Medications    aspirin (ASPIRIN ADULT LOW DOSE) 81 MG EC tablet    lisinopril (PRINIVIL;ZESTRIL) 20 MG tablet    simvastatin (ZOCOR) 10 MG tablet       Respiratory    Sleep apnea     Weight results of her recent sleep study         Hypoxia, sleep related     Continue her oxygen at bedtime            Digestive    Drug-induced constipation     Trial of MiraLAX 1 cap in liquid daily, Colace 100 mg twice daily and Dulcolax suppositories as needed. Obtain a KUB to check stool burden         Relevant Orders    XR ABDOMEN (KUB) (SINGLE AP VIEW)           Return in about 3 months (around 10/17/2019).        Benny Viera,   7/17/2019

## 2019-07-17 NOTE — ASSESSMENT & PLAN NOTE
Trial of MiraLAX 1 cap in liquid daily, Colace 100 mg twice daily and Dulcolax suppositories as needed.   Obtain a KUB to check stool burden

## 2019-07-18 LAB
AMYLASE: 59 UNITS/L
LIPASE: 23 UNITS/L

## 2019-08-01 DIAGNOSIS — K59.03 DRUG-INDUCED CONSTIPATION: ICD-10-CM

## 2019-08-01 DIAGNOSIS — R10.30 LOWER ABDOMINAL PAIN: ICD-10-CM

## 2019-08-09 ENCOUNTER — TELEPHONE (OUTPATIENT)
Dept: PRIMARY CARE CLINIC | Age: 74
End: 2019-08-09

## 2019-08-09 NOTE — TELEPHONE ENCOUNTER
Yamilka called in, asking for zofran refill. Per Dr Jody Monreal, this is okay to send in. I called it in to [facility].

## 2019-09-12 LAB
BUN / CREAT RATIO: 15
BUN BLDV-MCNC: 16 MG/DL
CREAT SERPL-MCNC: 1.05 MG/DL

## 2019-09-17 DIAGNOSIS — M81.0 OSTEOPOROSIS WITHOUT CURRENT PATHOLOGICAL FRACTURE, UNSPECIFIED OSTEOPOROSIS TYPE: ICD-10-CM

## 2019-09-17 RX ORDER — ERGOCALCIFEROL 1.25 MG/1
50000 CAPSULE ORAL
Qty: 6 CAPSULE | Refills: 1 | Status: SHIPPED | OUTPATIENT
Start: 2019-09-17 | End: 2019-09-18 | Stop reason: SDUPTHER

## 2019-09-18 ENCOUNTER — TELEPHONE (OUTPATIENT)
Dept: PRIMARY CARE CLINIC | Age: 74
End: 2019-09-18

## 2019-09-18 DIAGNOSIS — G47.34 NOCTURNAL HYPOXEMIA: Primary | ICD-10-CM

## 2019-09-18 RX ORDER — ERGOCALCIFEROL 1.25 MG/1
50000 CAPSULE ORAL
Qty: 6 CAPSULE | Refills: 1 | Status: SHIPPED | OUTPATIENT
Start: 2019-09-18 | End: 2019-10-23 | Stop reason: SDUPTHER

## 2019-09-19 NOTE — TELEPHONE ENCOUNTER
I informed Tamieradhamespavel Paul of what you said. She says she does not want to go back to Dr Esther Rodgers. She doesn't really want to have another test done, she just knows she is not using the oxygen so she is going to call the company to have them come pick it up. I told her she has the right to do that, however, it would be against Dr Noris Samuels advice. She says she understands but she just isnt using it and is calling them to come get it.

## 2019-09-24 RX ORDER — ONDANSETRON 4 MG/1
TABLET, FILM COATED ORAL
Refills: 1 | COMMUNITY
Start: 2019-08-23 | End: 2019-09-24 | Stop reason: SDUPTHER

## 2019-09-24 RX ORDER — ONDANSETRON 4 MG/1
TABLET, FILM COATED ORAL
Qty: 30 TABLET | Refills: 2 | Status: SHIPPED | OUTPATIENT
Start: 2019-09-24 | End: 2019-10-23

## 2019-10-23 ENCOUNTER — HOSPITAL ENCOUNTER (OUTPATIENT)
Age: 74
Discharge: HOME | End: 2019-10-25
Payer: MEDICARE

## 2019-10-23 ENCOUNTER — OFFICE VISIT (OUTPATIENT)
Dept: PRIMARY CARE CLINIC | Age: 74
End: 2019-10-23
Payer: MEDICARE

## 2019-10-23 VITALS
HEIGHT: 66 IN | TEMPERATURE: 98.2 F | WEIGHT: 149 LBS | RESPIRATION RATE: 16 BRPM | SYSTOLIC BLOOD PRESSURE: 130 MMHG | HEART RATE: 88 BPM | BODY MASS INDEX: 23.95 KG/M2 | OXYGEN SATURATION: 97 % | DIASTOLIC BLOOD PRESSURE: 76 MMHG

## 2019-10-23 DIAGNOSIS — C50.911 BILATERAL MALIGNANT NEOPLASM OF BREAST IN FEMALE, UNSPECIFIED ESTROGEN RECEPTOR STATUS, UNSPECIFIED SITE OF BREAST (HCC): ICD-10-CM

## 2019-10-23 DIAGNOSIS — E04.1 THYROID NODULE: ICD-10-CM

## 2019-10-23 DIAGNOSIS — M81.6 LOCALIZED OSTEOPOROSIS WITHOUT CURRENT PATHOLOGICAL FRACTURE: ICD-10-CM

## 2019-10-23 DIAGNOSIS — E78.2 MIXED HYPERLIPIDEMIA: ICD-10-CM

## 2019-10-23 DIAGNOSIS — J96.11 CHRONIC RESPIRATORY FAILURE WITH HYPOXIA (HCC): ICD-10-CM

## 2019-10-23 DIAGNOSIS — K21.9 GASTROESOPHAGEAL REFLUX DISEASE, ESOPHAGITIS PRESENCE NOT SPECIFIED: ICD-10-CM

## 2019-10-23 DIAGNOSIS — F33.2 SEVERE EPISODE OF RECURRENT MAJOR DEPRESSIVE DISORDER, WITHOUT PSYCHOTIC FEATURES (HCC): ICD-10-CM

## 2019-10-23 DIAGNOSIS — N18.30 STAGE 3 CHRONIC KIDNEY DISEASE (HCC): ICD-10-CM

## 2019-10-23 DIAGNOSIS — M81.0 OSTEOPOROSIS WITHOUT CURRENT PATHOLOGICAL FRACTURE, UNSPECIFIED OSTEOPOROSIS TYPE: ICD-10-CM

## 2019-10-23 DIAGNOSIS — C50.912 BILATERAL MALIGNANT NEOPLASM OF BREAST IN FEMALE, UNSPECIFIED ESTROGEN RECEPTOR STATUS, UNSPECIFIED SITE OF BREAST (HCC): ICD-10-CM

## 2019-10-23 DIAGNOSIS — Z23 NEED FOR SHINGLES VACCINE: ICD-10-CM

## 2019-10-23 DIAGNOSIS — G47.33 OBSTRUCTIVE SLEEP APNEA SYNDROME: Primary | ICD-10-CM

## 2019-10-23 DIAGNOSIS — I10 ESSENTIAL HYPERTENSION: ICD-10-CM

## 2019-10-23 PROBLEM — J44.9 CHRONIC OBSTRUCTIVE LUNG DISEASE (HCC): Status: RESOLVED | Noted: 2018-07-09 | Resolved: 2019-10-23

## 2019-10-23 LAB
BASOPHILS ABSOLUTE: 0.08 E9/L (ref 0–0.2)
BASOPHILS RELATIVE PERCENT: 0.9 % (ref 0–2)
EOSINOPHILS ABSOLUTE: 0.09 E9/L (ref 0.05–0.5)
EOSINOPHILS RELATIVE PERCENT: 1 % (ref 0–6)
HCT VFR BLD CALC: 37.5 % (ref 34–48)
HEMOGLOBIN: 12.5 G/DL (ref 11.5–15.5)
IMMATURE GRANULOCYTES #: 0.04 E9/L
IMMATURE GRANULOCYTES %: 0.4 % (ref 0–5)
LYMPHOCYTES ABSOLUTE: 3.19 E9/L (ref 1.5–4)
LYMPHOCYTES RELATIVE PERCENT: 34 % (ref 20–42)
MCH RBC QN AUTO: 30.9 PG (ref 26–35)
MCHC RBC AUTO-ENTMCNC: 33.3 % (ref 32–34.5)
MCV RBC AUTO: 92.6 FL (ref 80–99.9)
MONOCYTES ABSOLUTE: 0.8 E9/L (ref 0.1–0.95)
MONOCYTES RELATIVE PERCENT: 8.5 % (ref 2–12)
NEUTROPHILS ABSOLUTE: 5.18 E9/L (ref 1.8–7.3)
NEUTROPHILS RELATIVE PERCENT: 55.2 % (ref 43–80)
PDW BLD-RTO: 13.4 FL (ref 11.5–15)
PLATELET # BLD: 436 E9/L (ref 130–450)
PMV BLD AUTO: 9.4 FL (ref 7–12)
RBC # BLD: 4.05 E12/L (ref 3.5–5.5)
T4 FREE: 1.07 NG/DL (ref 0.93–1.7)
TSH SERPL DL<=0.05 MIU/L-ACNC: 1.26 UIU/ML (ref 0.27–4.2)
WBC # BLD: 9.4 E9/L (ref 4.5–11.5)

## 2019-10-23 PROCEDURE — 1090F PRES/ABSN URINE INCON ASSESS: CPT | Performed by: INTERNAL MEDICINE

## 2019-10-23 PROCEDURE — G8482 FLU IMMUNIZE ORDER/ADMIN: HCPCS | Performed by: INTERNAL MEDICINE

## 2019-10-23 PROCEDURE — 85025 COMPLETE CBC W/AUTO DIFF WBC: CPT

## 2019-10-23 PROCEDURE — 84443 ASSAY THYROID STIM HORMONE: CPT

## 2019-10-23 PROCEDURE — 4040F PNEUMOC VAC/ADMIN/RCVD: CPT | Performed by: INTERNAL MEDICINE

## 2019-10-23 PROCEDURE — 99214 OFFICE O/P EST MOD 30 MIN: CPT | Performed by: INTERNAL MEDICINE

## 2019-10-23 PROCEDURE — 84439 ASSAY OF FREE THYROXINE: CPT

## 2019-10-23 PROCEDURE — 36415 COLL VENOUS BLD VENIPUNCTURE: CPT

## 2019-10-23 PROCEDURE — 1123F ACP DISCUSS/DSCN MKR DOCD: CPT | Performed by: INTERNAL MEDICINE

## 2019-10-23 PROCEDURE — G8420 CALC BMI NORM PARAMETERS: HCPCS | Performed by: INTERNAL MEDICINE

## 2019-10-23 PROCEDURE — 1036F TOBACCO NON-USER: CPT | Performed by: INTERNAL MEDICINE

## 2019-10-23 PROCEDURE — G8400 PT W/DXA NO RESULTS DOC: HCPCS | Performed by: INTERNAL MEDICINE

## 2019-10-23 PROCEDURE — G8427 DOCREV CUR MEDS BY ELIG CLIN: HCPCS | Performed by: INTERNAL MEDICINE

## 2019-10-23 RX ORDER — ERGOCALCIFEROL 1.25 MG/1
50000 CAPSULE ORAL
Qty: 6 CAPSULE | Refills: 2 | Status: SHIPPED | OUTPATIENT
Start: 2019-10-23 | End: 2020-03-23 | Stop reason: SDUPTHER

## 2019-10-23 RX ORDER — LISINOPRIL 20 MG/1
20 TABLET ORAL DAILY
Qty: 90 TABLET | Refills: 2 | Status: SHIPPED | OUTPATIENT
Start: 2019-10-23 | End: 2020-09-22 | Stop reason: SDUPTHER

## 2019-10-23 ASSESSMENT — ENCOUNTER SYMPTOMS
VOMITING: 0
PHOTOPHOBIA: 0
ABDOMINAL PAIN: 0
CONSTIPATION: 1
TROUBLE SWALLOWING: 0
BLOOD IN STOOL: 0
DIARRHEA: 0
COLOR CHANGE: 0
EYE ITCHING: 0
EYE DISCHARGE: 0
FACIAL SWELLING: 0
SHORTNESS OF BREATH: 1
NAUSEA: 0
SORE THROAT: 0
RHINORRHEA: 0
ANAL BLEEDING: 0
EYE PAIN: 0
WHEEZING: 0
STRIDOR: 0
BACK PAIN: 1
COUGH: 0

## 2019-10-28 ENCOUNTER — NURSE ONLY (OUTPATIENT)
Dept: PRIMARY CARE CLINIC | Age: 74
End: 2019-10-28
Payer: MEDICARE

## 2019-10-28 PROCEDURE — 96372 THER/PROPH/DIAG INJ SC/IM: CPT | Performed by: INTERNAL MEDICINE

## 2019-10-29 ENCOUNTER — HOSPITAL ENCOUNTER (OUTPATIENT)
Age: 74
Discharge: HOME | End: 2019-10-31
Payer: MEDICARE

## 2019-10-29 PROCEDURE — 87624 HPV HI-RISK TYP POOLED RSLT: CPT

## 2019-10-29 PROCEDURE — 88175 CYTOPATH C/V AUTO FLUID REDO: CPT

## 2019-11-02 LAB
HPV SAMPLE: NORMAL
HPV TYPE 16: NOT DETECTED
HPV TYPE 18: NOT DETECTED
HPV, HIGH RISK OTHER: NOT DETECTED
INTERPRETATION: NORMAL
SOURCE: NORMAL

## 2019-11-29 ENCOUNTER — TELEPHONE (OUTPATIENT)
Dept: PRIMARY CARE CLINIC | Age: 74
End: 2019-11-29

## 2019-12-17 RX ORDER — ELECTROLYTES/DEXTROSE
SOLUTION, ORAL ORAL DAILY
COMMUNITY

## 2019-12-18 ENCOUNTER — OFFICE VISIT (OUTPATIENT)
Dept: PRIMARY CARE CLINIC | Age: 74
End: 2019-12-18
Payer: MEDICARE

## 2019-12-18 VITALS
TEMPERATURE: 97.7 F | BODY MASS INDEX: 25.16 KG/M2 | WEIGHT: 151 LBS | DIASTOLIC BLOOD PRESSURE: 78 MMHG | HEIGHT: 65 IN | SYSTOLIC BLOOD PRESSURE: 138 MMHG | OXYGEN SATURATION: 96 % | HEART RATE: 68 BPM

## 2019-12-18 DIAGNOSIS — Z00.00 ROUTINE GENERAL MEDICAL EXAMINATION AT A HEALTH CARE FACILITY: Primary | ICD-10-CM

## 2019-12-18 PROCEDURE — G8482 FLU IMMUNIZE ORDER/ADMIN: HCPCS | Performed by: INTERNAL MEDICINE

## 2019-12-18 PROCEDURE — 1123F ACP DISCUSS/DSCN MKR DOCD: CPT | Performed by: INTERNAL MEDICINE

## 2019-12-18 PROCEDURE — 4040F PNEUMOC VAC/ADMIN/RCVD: CPT | Performed by: INTERNAL MEDICINE

## 2019-12-18 PROCEDURE — G0439 PPPS, SUBSEQ VISIT: HCPCS | Performed by: INTERNAL MEDICINE

## 2019-12-18 ASSESSMENT — LIFESTYLE VARIABLES: HOW OFTEN DO YOU HAVE A DRINK CONTAINING ALCOHOL: 0

## 2019-12-18 ASSESSMENT — PATIENT HEALTH QUESTIONNAIRE - PHQ9: SUM OF ALL RESPONSES TO PHQ QUESTIONS 1-9: 12

## 2020-01-21 ASSESSMENT — ENCOUNTER SYMPTOMS
TROUBLE SWALLOWING: 0
VOMITING: 0
COLOR CHANGE: 0
SORE THROAT: 0
EYE DISCHARGE: 0
WHEEZING: 0
ABDOMINAL PAIN: 0
NAUSEA: 0
FACIAL SWELLING: 0
COUGH: 0
BACK PAIN: 1
DIARRHEA: 0
STRIDOR: 0
ANAL BLEEDING: 0
SHORTNESS OF BREATH: 1
BLOOD IN STOOL: 0
EYE PAIN: 0
CONSTIPATION: 1
PHOTOPHOBIA: 0
EYE ITCHING: 0
RHINORRHEA: 0

## 2020-01-22 ENCOUNTER — OFFICE VISIT (OUTPATIENT)
Dept: PRIMARY CARE CLINIC | Age: 75
End: 2020-01-22
Payer: MEDICARE

## 2020-01-22 VITALS
OXYGEN SATURATION: 96 % | BODY MASS INDEX: 29.25 KG/M2 | DIASTOLIC BLOOD PRESSURE: 66 MMHG | WEIGHT: 149 LBS | HEIGHT: 60 IN | HEART RATE: 91 BPM | SYSTOLIC BLOOD PRESSURE: 128 MMHG | TEMPERATURE: 96.8 F

## 2020-01-22 PROBLEM — J96.11 CHRONIC RESPIRATORY FAILURE WITH HYPOXIA (HCC): Status: RESOLVED | Noted: 2019-06-06 | Resolved: 2020-01-22

## 2020-01-22 PROCEDURE — G8482 FLU IMMUNIZE ORDER/ADMIN: HCPCS | Performed by: INTERNAL MEDICINE

## 2020-01-22 PROCEDURE — 1036F TOBACCO NON-USER: CPT | Performed by: INTERNAL MEDICINE

## 2020-01-22 PROCEDURE — 99214 OFFICE O/P EST MOD 30 MIN: CPT | Performed by: INTERNAL MEDICINE

## 2020-01-22 PROCEDURE — 1123F ACP DISCUSS/DSCN MKR DOCD: CPT | Performed by: INTERNAL MEDICINE

## 2020-01-22 PROCEDURE — G8427 DOCREV CUR MEDS BY ELIG CLIN: HCPCS | Performed by: INTERNAL MEDICINE

## 2020-01-22 PROCEDURE — G8417 CALC BMI ABV UP PARAM F/U: HCPCS | Performed by: INTERNAL MEDICINE

## 2020-01-22 PROCEDURE — 1090F PRES/ABSN URINE INCON ASSESS: CPT | Performed by: INTERNAL MEDICINE

## 2020-01-22 PROCEDURE — G8400 PT W/DXA NO RESULTS DOC: HCPCS | Performed by: INTERNAL MEDICINE

## 2020-01-22 PROCEDURE — 4040F PNEUMOC VAC/ADMIN/RCVD: CPT | Performed by: INTERNAL MEDICINE

## 2020-01-22 RX ORDER — DOXEPIN HYDROCHLORIDE 25 MG/1
CAPSULE ORAL
COMMUNITY
Start: 2020-01-13 | End: 2020-05-20

## 2020-01-22 NOTE — PROGRESS NOTES
insurance will not pay for one this year as it has to be 2 years before they approve another one. .  Her insurance is approved for Prolia shots we will order it and they give it to her. She has a history of a thyroid nodule, GERD, hyperlipidemia, chronic kidney disease stage III. History of hypertension. History of obstructive sleep apnea. She saw Dr. Saba Houston who recommended that she stay on her nighttime oxygen. Patient refuses to do so and has returned her oxygen. He documented decrease in her O2 saturation after 6-minute walk but patient absolutely refuses to use her oxygen or to follow-up for her CPAP titration. She says she has found the type of pillow that helps her sleep on her side and she is doing much better. She refuses further evaluation or treatment for hypoxia or sleep apnea. He did spirometry on her which was normal and he felt that she did not have COPD. Review of Systems   Constitutional: Negative for appetite change, fatigue and unexpected weight change. HENT: Negative for congestion, ear pain, facial swelling, rhinorrhea, sore throat, tinnitus and trouble swallowing. Eyes: Negative for photophobia, pain, discharge, itching and visual disturbance. Respiratory: Positive for shortness of breath. Negative for cough, wheezing and stridor. Cardiovascular: Negative for chest pain, palpitations and leg swelling. Gastrointestinal: Positive for constipation. Negative for abdominal pain, anal bleeding, blood in stool, diarrhea, nausea and vomiting. Endocrine: Negative for cold intolerance, heat intolerance, polydipsia, polyphagia and polyuria. Genitourinary: Negative for difficulty urinating, dysuria, flank pain, frequency, hematuria and urgency. Musculoskeletal: Positive for back pain. Negative for arthralgias, gait problem, joint swelling and myalgias. Skin: Negative for color change, pallor and rash.    Allergic/Immunologic: Negative for environmental allergies and food allergies. Neurological: Negative for dizziness, tremors, seizures, syncope, speech difficulty, weakness, light-headedness, numbness and headaches. Hematological: Negative for adenopathy. Does not bruise/bleed easily. Psychiatric/Behavioral: Negative for agitation, behavioral problems, confusion, sleep disturbance and suicidal ideas. The patient is nervous/anxious. Current Outpatient Medications:     doxepin (SINEQUAN) 25 MG capsule, TAKE 1 CAPSULE BY MOUTH DAILY FOR 7 DAYS THEN INCREASE TO 1 CAPSULE TWICE DAILY AS DIRECTED, Disp: , Rfl:     Multiple Vitamins-Minerals (MULTIVITAMIN ADULT) TABS, Take by mouth daily, Disp: , Rfl:     PROLIA 60 MG/ML SOSY SC injection, INJECT 1 ML INTO THE SKIN ONCE FOR 1 DOSE, Disp: , Rfl: 0    vitamin D (ERGOCALCIFEROL) 1.25 MG (04582 UT) CAPS capsule, Take 1 capsule by mouth every 14 days, Disp: 6 capsule, Rfl: 2    lisinopril (PRINIVIL;ZESTRIL) 20 MG tablet, Take 1 tablet by mouth daily, Disp: 90 tablet, Rfl: 2    denosumab (PROLIA) 60 MG/ML SOSY SC injection, Inject 1 mL into the skin once for 1 dose, Disp: 1 mL, Rfl: 0    escitalopram (LEXAPRO) 20 MG tablet, Take 20 mg by mouth daily, Disp: , Rfl:     misoprostol (CYTOTEC) 100 MCG tablet, Take 100 mcg by mouth 4 times daily, Disp: , Rfl:     OLANZapine (ZYPREXA) 2.5 MG tablet, Take 2.5 mg by mouth nightly, Disp: , Rfl:     traZODone (DESYREL) 50 MG tablet, Take 25 mg by mouth nightly, Disp: , Rfl:     mirtazapine (REMERON) 30 MG tablet, Take 30 mg by mouth nightly, Disp: , Rfl:     LORazepam (ATIVAN) 0.5 MG tablet, Take 0.5 mg by mouth 3 times daily. , Disp: , Rfl:     simvastatin (ZOCOR) 10 MG tablet, Take 1 tablet by mouth nightly, Disp: 90 tablet, Rfl: 3    albuterol sulfate  (90 Base) MCG/ACT inhaler, Inhale 2 puffs into the lungs 4 times daily as needed for Wheezing, Disp: 3 Inhaler, Rfl: 1    aspirin (ASPIRIN ADULT LOW DOSE) 81 MG EC tablet, , Disp: , Rfl:     HYDROcodone-acetaminophen moles    TONSILLECTOMY          Family History   Problem Relation Age of Onset    No Known Problems Mother     No Known Problems Father         Social History     Tobacco Use    Smoking status: Former Smoker     Packs/day: 1.00     Years: 12.00     Pack years: 12.00     Types: Cigarettes     Start date: 1965     Last attempt to quit: 1974     Years since quittin.6    Smokeless tobacco: Never Used   Substance Use Topics    Alcohol use: No    Drug use: Yes     Comment: Norco, Lorazepam        Objective  Vitals:    20 1448   BP: 128/66   Site: Right Upper Arm   Position: Sitting   Cuff Size: Medium Adult   Pulse: 91   Temp: 96.8 °F (36 °C)   TempSrc: Temporal   SpO2: 96%   Weight: 149 lb (67.6 kg)   Height: 5' (1.524 m)        Exam:  Physical Exam  Constitutional:       Appearance: She is well-developed. HENT:      Head: Normocephalic. Right Ear: External ear normal.      Left Ear: External ear normal.      Nose: Nose normal.      Mouth/Throat:      Pharynx: No oropharyngeal exudate. Eyes:      General: No scleral icterus. Right eye: No discharge. Left eye: No discharge. Conjunctiva/sclera: Conjunctivae normal.      Pupils: Pupils are equal, round, and reactive to light. Neck:      Musculoskeletal: Normal range of motion and neck supple. Thyroid: No thyromegaly. Cardiovascular:      Rate and Rhythm: Normal rate and regular rhythm. Heart sounds: Normal heart sounds. No murmur. No friction rub. No gallop. Pulmonary:      Effort: Pulmonary effort is normal. No respiratory distress. Breath sounds: Normal breath sounds. No wheezing or rales. Chest:      Chest wall: No tenderness. Abdominal:      General: Bowel sounds are normal. There is no distension. Palpations: Abdomen is soft. There is no mass. Tenderness: There is no tenderness. There is no guarding or rebound. Musculoskeletal: Normal range of motion.          General: No tenderness or deformity. Lymphadenopathy:      Cervical: No cervical adenopathy. Skin:     General: Skin is warm and dry. Capillary Refill: Appears anxious     Coloration: Skin is not pale. Findings: No erythema or rash. Neurological:      Mental Status: She is alert and oriented to person, place, and time. Cranial Nerves: No cranial nerve deficit. Sensory: No sensory deficit. Deep Tendon Reflexes: Reflexes normal.   Psychiatric:         Behavior: Behavior normal.          Last labs reviewed. ASSESSMENT & PLAN :   Problem List        Circulatory    Hypertension - Primary     Blood pressures are stable. Continue medications and monitor blood pressures at home. Call office if systolics are over 662 over diastolics over 90. Relevant Medications    aspirin (ASPIRIN ADULT LOW DOSE) 81 MG EC tablet    simvastatin (ZOCOR) 10 MG tablet    lisinopril (PRINIVIL;ZESTRIL) 20 MG tablet       Respiratory    Chronic respiratory failure with hypoxia (HCC)     Patient refuses oxygen. Reviewed Dr. Dong Given note. Digestive    Gastroesophageal reflux disease     Continue Cytotec as per GI         Relevant Medications    misoprostol (CYTOTEC) 100 MCG tablet       Musculoskeletal and Integument    Osteoporosis     Continue vitamin D3, calcium from food sources, and every 6 months Prolia injections         Relevant Medications    vitamin D (ERGOCALCIFEROL) 1.25 MG (14992 UT) CAPS capsule    denosumab (PROLIA) 60 MG/ML SOSY SC injection       Genitourinary    Uterine cancer ([facility] [address]     Follows up with Dr. Estrella Acharya for pelvics and Paps         Relevant Medications    HYDROcodone-acetaminophen (NORCO)  MG per tablet    aspirin (ASPIRIN ADULT LOW DOSE) 81 MG EC tablet    LORazepam (ATIVAN) 0.5 MG tablet    Stage 3 chronic kidney disease ([facility] [address]     Avoid NSAID's and will monitor kidney functions            Other    Chronic pain     Follow-up with pain management.   They give her her hydrocodone         Relevant Medications    HYDROcodone-acetaminophen (NORCO)  MG per tablet    aspirin (ASPIRIN ADULT LOW DOSE) 81 MG EC tablet    traZODone (DESYREL) 50 MG tablet    mirtazapine (REMERON) 30 MG tablet    escitalopram (LEXAPRO) 20 MG tablet    doxepin (SINEQUAN) 25 MG capsule    Breast cancer ([facility] [address]     She  had bilateral mastectomy with implants. Her oncologist still recommends she get mammograms. Relevant Medications    HYDROcodone-acetaminophen (NORCO)  MG per tablet    aspirin (ASPIRIN ADULT LOW DOSE) 81 MG EC tablet    LORazepam (ATIVAN) 0.5 MG tablet    Hyperlipidemia     Watch saturated fats in diet and will monitor lipids         Relevant Medications    aspirin (ASPIRIN ADULT LOW DOSE) 81 MG EC tablet    simvastatin (ZOCOR) 10 MG tablet    lisinopril (PRINIVIL;ZESTRIL) 20 MG tablet    Severe episode of recurrent major depressive disorder, without psychotic features ([facility] [address]     Continue follow-up with psychiatrist.  She adjust her medications         Relevant Medications    traZODone (DESYREL) 50 MG tablet    mirtazapine (REMERON) 30 MG tablet    LORazepam (ATIVAN) 0.5 MG tablet    escitalopram (LEXAPRO) 20 MG tablet    doxepin (SINEQUAN) 25 MG capsule           Return in about 3 months (around 4/22/2020).        Margot Braun,   1/22/2020

## 2020-02-27 ENCOUNTER — OFFICE VISIT (OUTPATIENT)
Dept: PRIMARY CARE CLINIC | Age: 75
End: 2020-02-27
Payer: MEDICARE

## 2020-02-27 VITALS
HEIGHT: 65 IN | RESPIRATION RATE: 18 BRPM | OXYGEN SATURATION: 98 % | HEART RATE: 108 BPM | BODY MASS INDEX: 23.66 KG/M2 | WEIGHT: 142 LBS | SYSTOLIC BLOOD PRESSURE: 114 MMHG | DIASTOLIC BLOOD PRESSURE: 64 MMHG | TEMPERATURE: 97.9 F

## 2020-02-27 PROBLEM — R11.0 NAUSEA: Status: ACTIVE | Noted: 2020-02-27

## 2020-02-27 PROCEDURE — 99214 OFFICE O/P EST MOD 30 MIN: CPT | Performed by: INTERNAL MEDICINE

## 2020-02-27 PROCEDURE — 1036F TOBACCO NON-USER: CPT | Performed by: INTERNAL MEDICINE

## 2020-02-27 PROCEDURE — G8400 PT W/DXA NO RESULTS DOC: HCPCS | Performed by: INTERNAL MEDICINE

## 2020-02-27 PROCEDURE — G8427 DOCREV CUR MEDS BY ELIG CLIN: HCPCS | Performed by: INTERNAL MEDICINE

## 2020-02-27 PROCEDURE — G8482 FLU IMMUNIZE ORDER/ADMIN: HCPCS | Performed by: INTERNAL MEDICINE

## 2020-02-27 PROCEDURE — 1123F ACP DISCUSS/DSCN MKR DOCD: CPT | Performed by: INTERNAL MEDICINE

## 2020-02-27 PROCEDURE — G8420 CALC BMI NORM PARAMETERS: HCPCS | Performed by: INTERNAL MEDICINE

## 2020-02-27 PROCEDURE — 1090F PRES/ABSN URINE INCON ASSESS: CPT | Performed by: INTERNAL MEDICINE

## 2020-02-27 PROCEDURE — 4040F PNEUMOC VAC/ADMIN/RCVD: CPT | Performed by: INTERNAL MEDICINE

## 2020-02-27 RX ORDER — ONDANSETRON 4 MG/1
4 TABLET, ORALLY DISINTEGRATING ORAL EVERY 8 HOURS PRN
Qty: 40 TABLET | Refills: 3 | Status: SHIPPED | OUTPATIENT
Start: 2020-02-27 | End: 2020-10-08

## 2020-02-27 RX ORDER — SIMVASTATIN 10 MG
10 TABLET ORAL NIGHTLY
Qty: 90 TABLET | Refills: 3 | Status: SHIPPED | OUTPATIENT
Start: 2020-02-27 | End: 2020-09-22 | Stop reason: SDUPTHER

## 2020-02-27 ASSESSMENT — ENCOUNTER SYMPTOMS
EYE ITCHING: 0
TROUBLE SWALLOWING: 0
EYE PAIN: 0
CONSTIPATION: 1
COUGH: 0
WHEEZING: 0
BACK PAIN: 1
COLOR CHANGE: 0
DIARRHEA: 0
NAUSEA: 0
PHOTOPHOBIA: 0
SHORTNESS OF BREATH: 1
EYE DISCHARGE: 0
VOMITING: 0
STRIDOR: 0
ABDOMINAL PAIN: 0
ANAL BLEEDING: 0
BLOOD IN STOOL: 0
FACIAL SWELLING: 0
RHINORRHEA: 0
SORE THROAT: 0

## 2020-02-27 NOTE — ASSESSMENT & PLAN NOTE
Take half doses MiraLAX and half dose Metamucil. Stay on this until she has normal bowel movements. Continue her probiotics and prebiotics.   Referral made to Dr. Ren Cheadle for further evaluation as she does not want to return to previous  GI doctors office

## 2020-02-27 NOTE — PROGRESS NOTES
2020    Name: Shaka Villegas : 1945 Sex: female  Age: 66 y.o. Subjective:  Chief Complaint   Patient presents with    Nausea     believes she has dysbiosis. Hypertension   Associated symptoms include shortness of breath. Pertinent negatives include no chest pain, headaches or palpitations. Hyperlipidemia   Associated symptoms include shortness of breath. Pertinent negatives include no chest pain or myalgias. This 66y.o. -year-old female  presents today for evaluation and management of her  chronic medical problems. Current medication list reviewed. The patient is tolerating all medications well without adverse events or known side effects. The patient does understand the risk and benefits of the prescribed medications. Patient has noted increased nausea and cramps. She has  opioid induced constipation and has taken liquid Dulcolax and MiraLAX with a little relief. She sometimes will drink as much as a half a bottle of milk of magnesia. Notes that her mental problems are exacerbated by being constipated. When she has a good bowel movements her mood and her mental symptoms improved. She goes to a pain management and they have her on Norco 10/325 along with an implantable pain stimulator. She is going to see them soon and her  would like to see if they can decrease the amount of Norco that she is taking. She had an appointment with Dr. Nadine Kim  NP  . She says when she has a good bowel movement her symptoms resolved. She has had nausea but no vomiting. Has been taking some merlyn tablets and this is helped her symptoms we will give her a trial of generic Zofran but this really helped her nausea. Unfortunately nurse practitioner decided to discontinue that and put her on Xifaxan which was way too expensive for her to take. She did not take the Xifaxan    She saw Dr. Stefan Isabel her psychiatrist and she is on doxepin now and citalopram was discontinued. Mentally she is doing better    She sees Dr Nicole Burks her oncologist for follow-up on her breast cancer. Reviewed his office note. She was taken off Arimidex as she has completed her therapy. Spencer Founds He wants her to continue Prolia and recommends a bone density scan. Unfortunately her insurance will not pay for one this year as it has to be 2 years before they approve another one. .  Her insurance has approved  Prolia shots we will order it and they give it to her. She has a history of a thyroid nodule, GERD, hyperlipidemia, chronic kidney disease stage III. History of hypertension. History of obstructive sleep apnea. She saw Dr. Lupillo Guzman who recommended that she stay on her nighttime oxygen. Patient refuses to do so and has returned her oxygen. He documented decrease in her O2 saturation after 6-minute walk but patient absolutely refuses to use her oxygen or to follow-up for her CPAP titration. She says she has found the type of pillow that helps her sleep on her side and she is doing much better. She refuses further evaluation or treatment for hypoxia or sleep apnea. He did spirometry on her which was normal and he felt that she did not have COPD. Review of Systems   Constitutional: Negative for appetite change, fatigue and unexpected weight change. HENT: Negative for congestion, ear pain, facial swelling, rhinorrhea, sore throat, tinnitus and trouble swallowing. Eyes: Negative for photophobia, pain, discharge, itching and visual disturbance. Respiratory: Positive for shortness of breath. Negative for cough, wheezing and stridor. Cardiovascular: Negative for chest pain, palpitations and leg swelling. Gastrointestinal: Positive for constipation. Negative for abdominal pain, anal bleeding, blood in stool, diarrhea, nausea and vomiting. Endocrine: Negative for cold intolerance, heat intolerance, polydipsia, polyphagia and polyuria.    Genitourinary: Negative for difficulty urinating, dysuria, flank pain, frequency, hematuria and urgency. Musculoskeletal: Positive for back pain. Negative for arthralgias, gait problem, joint swelling and myalgias. Skin: Negative for color change, pallor and rash. Allergic/Immunologic: Negative for environmental allergies and food allergies. Neurological: Negative for dizziness, tremors, seizures, syncope, speech difficulty, weakness, light-headedness, numbness and headaches. Hematological: Negative for adenopathy. Does not bruise/bleed easily. Psychiatric/Behavioral: Negative for agitation, behavioral problems, confusion, sleep disturbance and suicidal ideas. The patient is nervous/anxious. Current Outpatient Medications:     simvastatin (ZOCOR) 10 MG tablet, Take 1 tablet by mouth nightly, Disp: 90 tablet, Rfl: 3    ondansetron (ZOFRAN ODT) 4 MG disintegrating tablet, Take 1 tablet by mouth every 8 hours as needed for Nausea or Vomiting, Disp: 40 tablet, Rfl: 3    doxepin (SINEQUAN) 25 MG capsule, TAKE 1 CAPSULE BY MOUTH DAILY FOR 7 DAYS THEN INCREASE TO 1 CAPSULE TWICE DAILY AS DIRECTED, Disp: , Rfl:     Multiple Vitamins-Minerals (MULTIVITAMIN ADULT) TABS, Take by mouth daily, Disp: , Rfl:     vitamin D (ERGOCALCIFEROL) 1.25 MG (17151 UT) CAPS capsule, Take 1 capsule by mouth every 14 days, Disp: 6 capsule, Rfl: 2    lisinopril (PRINIVIL;ZESTRIL) 20 MG tablet, Take 1 tablet by mouth daily, Disp: 90 tablet, Rfl: 2    denosumab (PROLIA) 60 MG/ML SOSY SC injection, Inject 1 mL into the skin once for 1 dose, Disp: 1 mL, Rfl: 0    traZODone (DESYREL) 50 MG tablet, Take 25 mg by mouth nightly, Disp: , Rfl:     mirtazapine (REMERON) 30 MG tablet, Take 30 mg by mouth nightly, Disp: , Rfl:     LORazepam (ATIVAN) 0.5 MG tablet, Take 0.5 mg by mouth 3 times daily. , Disp: , Rfl:     aspirin (ASPIRIN ADULT LOW DOSE) 81 MG EC tablet, , Disp: , Rfl:     HYDROcodone-acetaminophen (NORCO)  MG per tablet, Take 1 tablet by mouth every 8 hours as needed Family History   Problem Relation Age of Onset    No Known Problems Mother     No Known Problems Father         Social History     Tobacco Use    Smoking status: Former Smoker     Packs/day: 1.00     Years: 12.00     Pack years: 12.00     Types: Cigarettes     Start date: 1965     Last attempt to quit: 1974     Years since quittin.7    Smokeless tobacco: Never Used   Substance Use Topics    Alcohol use: No    Drug use: Yes     Comment: Norco, Lorazepam        Objective  Vitals:    20 1250   BP: 114/64   Pulse: 108   Resp: 18   Temp: 97.9 °F (36.6 °C)   TempSrc: Temporal   SpO2: 98%   Weight: 142 lb (64.4 kg)   Height: 5' 5\" (1.651 m)        Exam:  Physical Exam  Constitutional:       Appearance: She is well-developed. HENT:      Head: Normocephalic. Right Ear: External ear normal.      Left Ear: External ear normal.      Nose: Nose normal.      Mouth/Throat:      Pharynx: No oropharyngeal exudate. Eyes:      General: No scleral icterus. Right eye: No discharge. Left eye: No discharge. Conjunctiva/sclera: Conjunctivae normal.      Pupils: Pupils are equal, round, and reactive to light. Neck:      Musculoskeletal: Normal range of motion and neck supple. Thyroid: No thyromegaly. Cardiovascular:      Rate and Rhythm: Normal rate and regular rhythm. Heart sounds: Normal heart sounds. No murmur. No friction rub. No gallop. Pulmonary:      Effort: Pulmonary effort is normal. No respiratory distress. Breath sounds: Normal breath sounds. No wheezing or rales. Chest:      Chest wall: No tenderness. Abdominal:      General: Bowel sounds are normal. There is no distension. Palpations: Abdomen is soft. There is no mass. Tenderness: There is no abdominal tenderness. There is no guarding or rebound. Musculoskeletal: Normal range of motion. General: No tenderness or deformity.    Lymphadenopathy:      Cervical: No cervical Nausea - Primary     Restart her Zofran on a as needed basis. When  her symptoms improve, she can discontinue the Zofran         Relevant Medications    ondansetron (ZOFRAN ODT) 4 MG disintegrating tablet    Other Relevant Orders    External Referral To General Surgery           Return in about 1 month (around 3/27/2020).        Fam Tobin,   2/27/2020

## 2020-02-27 NOTE — ASSESSMENT & PLAN NOTE
Blood pressures are stable. Continue medications and monitor blood pressures at home. Call office if systolics are over 467 over diastolics over 90.

## 2020-03-23 RX ORDER — ERGOCALCIFEROL 1.25 MG/1
50000 CAPSULE ORAL
Qty: 6 CAPSULE | Refills: 2 | Status: SHIPPED
Start: 2020-03-23 | End: 2020-09-22 | Stop reason: SDUPTHER

## 2020-03-23 NOTE — TELEPHONE ENCOUNTER
Last Appointment:  2/27/2020  Future Appointments   Date Time Provider Miriam Gifford   5/20/2020 10:30 AM [facility], DO [facility]      Refills pending

## 2020-05-20 ENCOUNTER — VIRTUAL VISIT (OUTPATIENT)
Dept: PRIMARY CARE CLINIC | Age: 75
End: 2020-05-20
Payer: MEDICARE

## 2020-05-20 VITALS — BODY MASS INDEX: 23.66 KG/M2 | HEIGHT: 65 IN | WEIGHT: 142 LBS

## 2020-05-20 PROBLEM — R82.90 ABNORMAL URINE ODOR: Status: ACTIVE | Noted: 2020-05-20

## 2020-05-20 PROCEDURE — 99443 PR PHYS/QHP TELEPHONE EVALUATION 21-30 MIN: CPT | Performed by: INTERNAL MEDICINE

## 2020-05-20 ASSESSMENT — ENCOUNTER SYMPTOMS
CONSTIPATION: 1
BLOOD IN STOOL: 0
RHINORRHEA: 0
COLOR CHANGE: 0
PHOTOPHOBIA: 0
BACK PAIN: 1
NAUSEA: 0
EYE ITCHING: 0
ANAL BLEEDING: 0
SHORTNESS OF BREATH: 1
SORE THROAT: 0
EYE PAIN: 0
EYE DISCHARGE: 0
TROUBLE SWALLOWING: 0
VOMITING: 0
FACIAL SWELLING: 0
WHEEZING: 0
COUGH: 0
ABDOMINAL PAIN: 0
STRIDOR: 0
DIARRHEA: 0

## 2020-05-20 NOTE — ASSESSMENT & PLAN NOTE
Continue vitamin D and calcium. Follow-up density scan after August 14, 2018. Prescription for Prolia.   They will come back and have it given

## 2020-05-20 NOTE — PROGRESS NOTES
vomiting. Endocrine: Negative for cold intolerance, heat intolerance, polydipsia, polyphagia and polyuria. Genitourinary: Negative for difficulty urinating, dysuria, flank pain, frequency, hematuria and urgency. Musculoskeletal: Positive for back pain. Negative for arthralgias, gait problem, joint swelling and myalgias. Skin: Negative for color change, pallor and rash. Allergic/Immunologic: Negative for environmental allergies and food allergies. Neurological: Negative for dizziness, tremors, seizures, syncope, speech difficulty, weakness, light-headedness, numbness and headaches. Hematological: Negative for adenopathy. Does not bruise/bleed easily. Psychiatric/Behavioral: Negative for agitation, behavioral problems, confusion, sleep disturbance and suicidal ideas. The patient is nervous/anxious. Current Outpatient Medications:     denosumab (PROLIA) 60 MG/ML SOSY SC injection, Inject 1 mL into the skin every 6 months, Disp: 1 mL, Rfl: 1    vitamin D (ERGOCALCIFEROL) 1.25 MG (30008 UT) CAPS capsule, Take 1 capsule by mouth every 14 days, Disp: 6 capsule, Rfl: 2    simvastatin (ZOCOR) 10 MG tablet, Take 1 tablet by mouth nightly, Disp: 90 tablet, Rfl: 3    ondansetron (ZOFRAN ODT) 4 MG disintegrating tablet, Take 1 tablet by mouth every 8 hours as needed for Nausea or Vomiting, Disp: 40 tablet, Rfl: 3    Multiple Vitamins-Minerals (MULTIVITAMIN ADULT) TABS, Take by mouth daily, Disp: , Rfl:     lisinopril (PRINIVIL;ZESTRIL) 20 MG tablet, Take 1 tablet by mouth daily, Disp: 90 tablet, Rfl: 2    traZODone (DESYREL) 50 MG tablet, Take 25 mg by mouth nightly, Disp: , Rfl:     mirtazapine (REMERON) 30 MG tablet, Take 30 mg by mouth nightly, Disp: , Rfl:     LORazepam (ATIVAN) 0.5 MG tablet, Take 1 mg by mouth 3 times daily.  , Disp: , Rfl:     aspirin (ASPIRIN ADULT LOW DOSE) 81 MG EC tablet, , Disp: , Rfl:     HYDROcodone-acetaminophen (NORCO)  MG per tablet, Take 1 tablet by mouth every 8 hours as needed for Pain ., Disp: , Rfl:      Allergies   Allergen Reactions    Epinephrine Other (See Comments)     PATIENT SATES \"HEART PALPATATIONS. \"     Adhesive Tape Rash        Past Medical History:   Diagnosis Date    Abnormal Pap smear of cervix     Chronic obstructive lung disease ([address] 7/9/2018    Chronic pain     Chronic respiratory failure with hypoxia (HCC) 6/6/2019    Depression     Hyperlipidemia     Hypertension     Osteoarthritis     Osteoporosis     Palpitations     Sleep apnea     doesnt use her cpap       Health Maintenance Due   Topic Date Due    DTaP/Tdap/Td vaccine (1 - Tdap) 10/10/1962    Potassium monitoring  02/18/2020    Lipid screen  04/25/2020        Patient Active Problem List   Diagnosis    Low back pain    Degeneration of lumbar or lumbosacral intervertebral disc    Spinal stenosis    Depression    Chronic pain    Gastroesophageal reflux disease    Hyperlipidemia    Hypertension    Osteoporosis    Sleep apnea    Thyroid nodule    Uterine cancer ([address]    Chronic respiratory failure with hypoxia (HCC)    Hypoxia, sleep related    Need for tetanus, diphtheria, and acellular pertussis (Tdap) vaccine    Severe episode of recurrent major depressive disorder, without psychotic features ([address]    Drug-induced constipation    Stage 3 chronic kidney disease ([address]    Nausea    Abnormal urine odor        Past Surgical History:   Procedure Laterality Date    BACK SURGERY  04/19/2017    T7 spinal cord stimulator with Dr. Nima Barrios      breast reconstruction w/implants    COLONOSCOPY      COSMETIC SURGERY      jaw surgery    ENDOSCOPY, COLON, DIAGNOSTIC      HYSTERECTOMY  2004    complete    MASTECTOMY  2000, 2014    left and right    OTHER SURGICAL HISTORY N/A 02/03/2017    stage 1  5 day spinal cord stimulator trial [employer]    SKIN BIOPSY      moles    TONSILLECTOMY          Family History   Problem Relation Age of receive a bill for this telephone service, depending on her insurance coverage, and has provided verbal consent to proceed: Yes      Documentation:  I communicated with the patient and/or health care decision maker about hypertension. Details of this discussion including any medical advice provided:   See note above    Patient was located at her [facility] home address, provider was located at other address in [place]. No one else was involved in this visit      I affirm this is a Patient Initiated Episode with a Patient who has not had a related appointment within my department in the past 7 days or scheduled within the next 24 hours.     Patient identification was verified at the start of the visit: Yes    Total Time: minutes: 21-30 minutes    Note: not billable if this call serves to triage the patient into an appointment for the relevant concern      Alfredo Nicole

## 2020-05-21 ENCOUNTER — HOSPITAL ENCOUNTER (OUTPATIENT)
Age: 75
Discharge: HOME | End: 2020-05-23
Payer: MEDICARE

## 2020-05-21 ENCOUNTER — TELEPHONE (OUTPATIENT)
Dept: PRIMARY CARE CLINIC | Age: 75
End: 2020-05-21

## 2020-05-21 LAB
ALBUMIN SERPL-MCNC: 4.8 G/DL (ref 3.5–5.2)
ALP BLD-CCNC: 41 U/L (ref 35–104)
ALT SERPL-CCNC: 10 U/L (ref 0–32)
ANION GAP SERPL CALCULATED.3IONS-SCNC: 17 MMOL/L (ref 7–16)
AST SERPL-CCNC: 15 U/L (ref 0–31)
BASOPHILS ABSOLUTE: 0.08 E9/L (ref 0–0.2)
BASOPHILS RELATIVE PERCENT: 1.3 % (ref 0–2)
BILIRUB SERPL-MCNC: 0.3 MG/DL (ref 0–1.2)
BILIRUBIN, POC: NEGATIVE
BLOOD URINE, POC: NORMAL
BUN BLDV-MCNC: 16 MG/DL (ref 8–23)
CALCIUM SERPL-MCNC: 9.8 MG/DL (ref 8.6–10.2)
CHLORIDE BLD-SCNC: 101 MMOL/L (ref 98–107)
CHOLESTEROL, TOTAL: 198 MG/DL (ref 0–199)
CLARITY, POC: NORMAL
CO2: 22 MMOL/L (ref 22–29)
COLOR, POC: YELLOW
CREAT SERPL-MCNC: 1 MG/DL (ref 0.5–1)
EOSINOPHILS ABSOLUTE: 0.18 E9/L (ref 0.05–0.5)
EOSINOPHILS RELATIVE PERCENT: 2.8 % (ref 0–6)
GFR AFRICAN AMERICAN: >60
GFR NON-AFRICAN AMERICAN: 54 ML/MIN/1.73
GLUCOSE BLD-MCNC: 106 MG/DL (ref 74–99)
GLUCOSE URINE, POC: NEGATIVE
HCT VFR BLD CALC: 39.6 % (ref 34–48)
HDLC SERPL-MCNC: 54 MG/DL
HEMOGLOBIN: 12.9 G/DL (ref 11.5–15.5)
IMMATURE GRANULOCYTES #: 0.01 E9/L
IMMATURE GRANULOCYTES %: 0.2 % (ref 0–5)
KETONES, POC: NEGATIVE
LDL CHOLESTEROL CALCULATED: 101 MG/DL (ref 0–99)
LEUKOCYTE EST, POC: NEGATIVE
LYMPHOCYTES ABSOLUTE: 2.79 E9/L (ref 1.5–4)
LYMPHOCYTES RELATIVE PERCENT: 43.8 % (ref 20–42)
MCH RBC QN AUTO: 30.4 PG (ref 26–35)
MCHC RBC AUTO-ENTMCNC: 32.6 % (ref 32–34.5)
MCV RBC AUTO: 93.4 FL (ref 80–99.9)
MONOCYTES ABSOLUTE: 0.55 E9/L (ref 0.1–0.95)
MONOCYTES RELATIVE PERCENT: 8.6 % (ref 2–12)
NEUTROPHILS ABSOLUTE: 2.76 E9/L (ref 1.8–7.3)
NEUTROPHILS RELATIVE PERCENT: 43.3 % (ref 43–80)
NITRITE, POC: NEGATIVE
PDW BLD-RTO: 13.6 FL (ref 11.5–15)
PH, POC: 5.5
PLATELET # BLD: 496 E9/L (ref 130–450)
PMV BLD AUTO: 9.2 FL (ref 7–12)
POTASSIUM SERPL-SCNC: 4.2 MMOL/L (ref 3.5–5)
PROTEIN, POC: NEGATIVE
RBC # BLD: 4.24 E12/L (ref 3.5–5.5)
SODIUM BLD-SCNC: 140 MMOL/L (ref 132–146)
SPECIFIC GRAVITY, POC: 1.02
T4 FREE: 1.11 NG/DL (ref 0.93–1.7)
TOTAL PROTEIN: 7.7 G/DL (ref 6.4–8.3)
TRIGL SERPL-MCNC: 216 MG/DL (ref 0–149)
TSH SERPL DL<=0.05 MIU/L-ACNC: 1.38 UIU/ML (ref 0.27–4.2)
UROBILINOGEN, POC: 0.2
VITAMIN D 25-HYDROXY: 47 NG/ML (ref 30–100)
VLDLC SERPL CALC-MCNC: 43 MG/DL
WBC # BLD: 6.4 E9/L (ref 4.5–11.5)

## 2020-05-21 PROCEDURE — 36415 COLL VENOUS BLD VENIPUNCTURE: CPT

## 2020-05-21 PROCEDURE — 87088 URINE BACTERIA CULTURE: CPT

## 2020-05-21 PROCEDURE — 82306 VITAMIN D 25 HYDROXY: CPT

## 2020-05-21 PROCEDURE — 80061 LIPID PANEL: CPT

## 2020-05-21 PROCEDURE — 84439 ASSAY OF FREE THYROXINE: CPT

## 2020-05-21 PROCEDURE — 81002 URINALYSIS NONAUTO W/O SCOPE: CPT | Performed by: INTERNAL MEDICINE

## 2020-05-21 PROCEDURE — 84443 ASSAY THYROID STIM HORMONE: CPT

## 2020-05-21 PROCEDURE — 85025 COMPLETE CBC W/AUTO DIFF WBC: CPT

## 2020-05-21 PROCEDURE — 80053 COMPREHEN METABOLIC PANEL: CPT

## 2020-05-21 NOTE — TELEPHONE ENCOUNTER
Patient came into the office per Dr Raheem Rios instruction, c/o dysuria. Patient informed of POCT Urinalysis results, we will send out culture and call her when those results come in. No treatment at this time.

## 2020-05-23 LAB — URINE CULTURE, ROUTINE: NORMAL

## 2020-06-10 ENCOUNTER — TELEPHONE (OUTPATIENT)
Dept: PRIMARY CARE CLINIC | Age: 75
End: 2020-06-10

## 2020-06-10 NOTE — TELEPHONE ENCOUNTER
Last Appointment:  2/27/2020  Future Appointments   Date Time Provider Miriam Gifford   7/23/2020  9:45 AM [facility]      Patient reports she is having problems going to bathroom even with laxatives. She is requesting referral for colonoscopy. Patient would like a return call to where referral went.     Electronically signed by Alex Grider LPN on 5/38/1957 at 59:65 PM

## 2020-06-10 NOTE — TELEPHONE ENCOUNTER
Patient was referred to Dr. Sury Shearer at [facility] gastroenterology on 5/20/2020. If she has not heard from their office she should call them and find out when she is to be scheduled and let them know about the constipation.

## 2020-06-11 NOTE — TELEPHONE ENCOUNTER
Referral created and faxed to Dr Michel Rico office. They will review and call pt to schedule. Pt is aware to be expecting their call.   Gave her Dr Michel Rico # to call and check on status of referral.

## 2020-06-11 NOTE — TELEPHONE ENCOUNTER
Referral for this patient has been faxed to Dr. Suhas Dietrich office.       Electronically signed by Zenia Diez LPN on 6/22/2123 at 9:20 AM

## 2020-06-15 ENCOUNTER — TELEPHONE (OUTPATIENT)
Dept: PRIMARY CARE CLINIC | Age: 75
End: 2020-06-15

## 2020-06-15 NOTE — TELEPHONE ENCOUNTER
Pt called in stating she was having diarrhea for 4 days and it is black. I advised pt she should be evaluated she stated she would go to er.

## 2020-06-17 ENCOUNTER — TELEPHONE (OUTPATIENT)
Dept: ADMINISTRATIVE | Age: 75
End: 2020-06-17

## 2020-06-17 RX ORDER — ONDANSETRON 4 MG/1
4 TABLET, FILM COATED ORAL 3 TIMES DAILY PRN
Qty: 30 TABLET | Refills: 0 | Status: SHIPPED
Start: 2020-06-17 | End: 2020-10-08

## 2020-06-22 RX ORDER — DICYCLOMINE HYDROCHLORIDE 10 MG/1
10 CAPSULE ORAL
Qty: 360 CAPSULE | Refills: 0 | Status: SHIPPED
Start: 2020-06-22 | End: 2020-07-23 | Stop reason: SDUPTHER

## 2020-06-22 RX ORDER — DICYCLOMINE HYDROCHLORIDE 10 MG/1
10 CAPSULE ORAL
COMMUNITY
End: 2020-06-22 | Stop reason: SDUPTHER

## 2020-06-22 RX ORDER — DOXEPIN HYDROCHLORIDE 10 MG/1
10 CAPSULE ORAL 2 TIMES DAILY
Qty: 60 CAPSULE | Refills: 2 | Status: SHIPPED
Start: 2020-06-22 | End: 2020-10-27 | Stop reason: ALTCHOICE

## 2020-06-22 RX ORDER — DOXEPIN HYDROCHLORIDE 25 MG/1
25 CAPSULE ORAL 2 TIMES DAILY
COMMUNITY
End: 2020-06-22 | Stop reason: SDUPTHER

## 2020-07-09 ENCOUNTER — TELEPHONE (OUTPATIENT)
Dept: PRIMARY CARE CLINIC | Age: 75
End: 2020-07-09

## 2020-07-09 NOTE — TELEPHONE ENCOUNTER
Patient reports she has used Miralax, Benefiber, and Dulcolax. She is drinking water and has tried prunes. Nothing seems to help. She also states she wakes up wishing she was dead.     Electronically signed by Kinjal White LPN on 9/2/6550 at 1:08 AM

## 2020-07-09 NOTE — TELEPHONE ENCOUNTER
Patient informed of new medication Linzess and instructed to continue with Miralax in mornings and Dulcolax at bedtime. She will report in a week how she is doing.

## 2020-07-09 NOTE — TELEPHONE ENCOUNTER
Start Linzess at low dose. Continue MiraLAX in the morning and Dulcolax at night. Increase fluids. If no better after a week double the dose of Linzess.   If still no better in spite of all of this she will need to go back and see her GI doctor for further directions

## 2020-07-09 NOTE — TELEPHONE ENCOUNTER
Last Appointment:  2/27/2020  Future Appointments   Date Time Provider Miriam Gifford   7/23/2020  9:45 AM Calderon Castellanos DO [facility]      Patient left message she has been constipated and struggles most mornings to have BM. She is asking if there is something she can take that will help her. Please advise.     Electronically signed by Martin Espinoza LPN on 9/1/1059 at 0:92 AM

## 2020-07-23 ENCOUNTER — HOSPITAL ENCOUNTER (OUTPATIENT)
Age: 75
Discharge: HOME | End: 2020-07-25
Payer: MEDICARE

## 2020-07-23 ENCOUNTER — OFFICE VISIT (OUTPATIENT)
Dept: PRIMARY CARE CLINIC | Age: 75
End: 2020-07-23
Payer: MEDICARE

## 2020-07-23 VITALS
DIASTOLIC BLOOD PRESSURE: 70 MMHG | HEART RATE: 66 BPM | BODY MASS INDEX: 21.49 KG/M2 | SYSTOLIC BLOOD PRESSURE: 104 MMHG | WEIGHT: 129 LBS | TEMPERATURE: 98.1 F | HEIGHT: 65 IN | OXYGEN SATURATION: 98 %

## 2020-07-23 PROBLEM — R35.0 URINARY FREQUENCY: Status: ACTIVE | Noted: 2020-07-23

## 2020-07-23 LAB
BACTERIA: ABNORMAL /HPF
BILIRUBIN URINE: NEGATIVE
BLOOD, URINE: ABNORMAL
CLARITY: CLEAR
COLOR: YELLOW
CRYSTALS, UA: ABNORMAL /HPF
GLUCOSE URINE: NEGATIVE MG/DL
KETONES, URINE: NEGATIVE MG/DL
LEUKOCYTE ESTERASE, URINE: NEGATIVE
NITRITE, URINE: NEGATIVE
PH UA: 5.5 (ref 5–9)
PROTEIN UA: NEGATIVE MG/DL
RBC UA: ABNORMAL /HPF (ref 0–2)
SPECIFIC GRAVITY UA: >=1.03 (ref 1–1.03)
UROBILINOGEN, URINE: 0.2 E.U./DL
WBC UA: ABNORMAL /HPF (ref 0–5)

## 2020-07-23 PROCEDURE — G8427 DOCREV CUR MEDS BY ELIG CLIN: HCPCS | Performed by: INTERNAL MEDICINE

## 2020-07-23 PROCEDURE — 1123F ACP DISCUSS/DSCN MKR DOCD: CPT | Performed by: INTERNAL MEDICINE

## 2020-07-23 PROCEDURE — G8420 CALC BMI NORM PARAMETERS: HCPCS | Performed by: INTERNAL MEDICINE

## 2020-07-23 PROCEDURE — 4040F PNEUMOC VAC/ADMIN/RCVD: CPT | Performed by: INTERNAL MEDICINE

## 2020-07-23 PROCEDURE — 1036F TOBACCO NON-USER: CPT | Performed by: INTERNAL MEDICINE

## 2020-07-23 PROCEDURE — 1090F PRES/ABSN URINE INCON ASSESS: CPT | Performed by: INTERNAL MEDICINE

## 2020-07-23 PROCEDURE — 99214 OFFICE O/P EST MOD 30 MIN: CPT | Performed by: INTERNAL MEDICINE

## 2020-07-23 PROCEDURE — G8400 PT W/DXA NO RESULTS DOC: HCPCS | Performed by: INTERNAL MEDICINE

## 2020-07-23 PROCEDURE — 81001 URINALYSIS AUTO W/SCOPE: CPT

## 2020-07-23 PROCEDURE — 96372 THER/PROPH/DIAG INJ SC/IM: CPT | Performed by: INTERNAL MEDICINE

## 2020-07-23 PROCEDURE — 87088 URINE BACTERIA CULTURE: CPT

## 2020-07-23 RX ORDER — DICYCLOMINE HYDROCHLORIDE 10 MG/1
10 CAPSULE ORAL
Qty: 360 CAPSULE | Refills: 0 | Status: SHIPPED | OUTPATIENT
Start: 2020-07-23 | End: 2020-10-08 | Stop reason: SDUPTHER

## 2020-07-23 NOTE — PROGRESS NOTES
2020    Name: Nakia Cordon : 1945 Sex: female  Age: 66 y.o. Subjective:  Chief Complaint   Patient presents with    Weight Loss        HPI     She is here with unintentional weight loss. She is lost about 20 pounds since 2020. She is just not hungry. She is has dysphagia which apparently was worse after GI stretched her esophagus. She went to see Dr. Yani Dominguez. Reviewed his report. He did not feel she needed a repeat colonoscopy. He recommended continuing with a single small volume saline enema for relief of her chronic narcotic induced constipation. She continues to see pain management who has her on opioids. If she does not take the opioid she is unable to function because of chronic back pain. She has chronic respiratory failure with sleep-related hypoxia. She refuses to use any oxygen or use any CPAP or BiPAP. She has a history of osteoporosis on Prolia every 6 months. She will need a DEXA scan in the near future. She has a history of a thyroid nodule and will need an ultrasound of her thyroid. She sees Dr. Cindy Saenz for follow-up on her breast cancer. She will have a repeat CT scan of her chest in 2 months as she is up abnormalities on the last one. She has a history of CKD stage III. She complains of urinary frequency and dysuria. We will check a urine culture. Review of Systems   Constitutional: Positive for appetite change and unexpected weight change. Negative for fatigue. HENT: Negative for congestion, ear pain, facial swelling, rhinorrhea, sore throat, tinnitus and trouble swallowing. Eyes: Negative for photophobia, pain, discharge, itching and visual disturbance. Respiratory: Negative for cough, shortness of breath, wheezing and stridor. Cardiovascular: Negative for chest pain, palpitations and leg swelling. Gastrointestinal: Positive for constipation.  Negative for abdominal pain, anal bleeding, blood in stool, diarrhea, nausea and vomiting. Dysphagia   Endocrine: Negative for cold intolerance, heat intolerance, polydipsia, polyphagia and polyuria. Genitourinary: Positive for dysuria and frequency. Negative for difficulty urinating, flank pain, hematuria and urgency. Musculoskeletal: Positive for arthralgias and back pain. Negative for gait problem, joint swelling and myalgias. Skin: Negative for color change, pallor and rash. Allergic/Immunologic: Negative for environmental allergies and food allergies. Neurological: Positive for tremors. Negative for dizziness, seizures, syncope, speech difficulty, light-headedness, numbness and headaches. Hematological: Negative for adenopathy. Does not bruise/bleed easily. Psychiatric/Behavioral: Negative for agitation, behavioral problems, confusion, sleep disturbance and suicidal ideas. The patient is nervous/anxious.            Current Outpatient Medications:     dicyclomine (BENTYL) 10 MG capsule, Take 1 capsule by mouth 4 times daily (before meals and nightly), Disp: 360 capsule, Rfl: 0    linaCLOtide (LINZESS) 72 MCG CAPS capsule, Take 1 capsule by mouth every morning (before breakfast), Disp: 30 capsule, Rfl: 1    ondansetron (ZOFRAN) 4 MG tablet, Take 1 tablet by mouth 3 times daily as needed for Nausea or Vomiting, Disp: 30 tablet, Rfl: 0    denosumab (PROLIA) 60 MG/ML SOSY SC injection, Inject 1 mL into the skin every 6 months, Disp: 1 mL, Rfl: 1    vitamin D (ERGOCALCIFEROL) 1.25 MG (03746 UT) CAPS capsule, Take 1 capsule by mouth every 14 days, Disp: 6 capsule, Rfl: 2    simvastatin (ZOCOR) 10 MG tablet, Take 1 tablet by mouth nightly, Disp: 90 tablet, Rfl: 3    ondansetron (ZOFRAN ODT) 4 MG disintegrating tablet, Take 1 tablet by mouth every 8 hours as needed for Nausea or Vomiting, Disp: 40 tablet, Rfl: 3    Multiple Vitamins-Minerals (MULTIVITAMIN ADULT) TABS, Take by mouth daily, Disp: , Rfl:     lisinopril (PRINIVIL;ZESTRIL) 20 MG tablet, Take 1 tablet by mouth daily, Disp: 90 tablet, Rfl: 2    traZODone (DESYREL) 50 MG tablet, Take 25 mg by mouth nightly, Disp: , Rfl:     mirtazapine (REMERON) 30 MG tablet, Take 30 mg by mouth nightly, Disp: , Rfl:     LORazepam (ATIVAN) 0.5 MG tablet, Take 1 mg by mouth 3 times daily. , Disp: , Rfl:     aspirin (ASPIRIN ADULT LOW DOSE) 81 MG EC tablet, , Disp: , Rfl:     HYDROcodone-acetaminophen (NORCO)  MG per tablet, Take 1 tablet by mouth every 8 hours as needed for Pain ., Disp: , Rfl:     doxepin (SINEQUAN) 10 MG capsule, Take 1 capsule by mouth 2 times daily, Disp: 60 capsule, Rfl: 2     Allergies   Allergen Reactions    Epinephrine Other (See Comments)     PATIENT SATES \"HEART PALPATATIONS. \"     Adhesive Tape Rash        Past Medical History:   Diagnosis Date    Abnormal Pap smear of cervix     Chronic obstructive lung disease ([facility] [address] 7/9/2018    Chronic pain     Chronic respiratory failure with hypoxia (HCC) 6/6/2019    Depression     Hyperlipidemia     Hypertension     Osteoarthritis     Osteoporosis     Palpitations     Sleep apnea     doesnt use her cpap       Health Maintenance Due   Topic Date Due    DTaP/Tdap/Td vaccine (1 - Tdap) 10/10/1962        Patient Active Problem List   Diagnosis    Low back pain    Degeneration of lumbar or lumbosacral intervertebral disc    Spinal stenosis    Depression    Chronic pain    Gastroesophageal reflux disease    Hyperlipidemia    Hypertension    Osteoporosis    Sleep apnea    Thyroid nodule    Uterine cancer ([facility] [address]    Chronic respiratory failure with hypoxia (HCC)    Hypoxia, sleep related    Need for tetanus, diphtheria, and acellular pertussis (Tdap) vaccine    Severe episode of recurrent major depressive disorder, without psychotic features ([facility] [address]    Drug-induced constipation    Stage 3 chronic kidney disease (HCC)    Nausea    Abnormal urine odor    Urinary frequency        Past Surgical History:   Procedure Laterality Date    BACK SURGERY  2017    T7 spinal cord stimulator with Dr. Dorene Weinberg      breast reconstruction w/implants    COLONOSCOPY      COSMETIC SURGERY      jaw surgery    ENDOSCOPY, COLON, DIAGNOSTIC      HYSTERECTOMY  2004    complete    MASTECTOMY  ,     left and right    OTHER SURGICAL HISTORY N/A 2017    stage 1  5 day spinal cord stimulator trial [employer]    SKIN BIOPSY      moles    TONSILLECTOMY          Family History   Problem Relation Age of Onset    No Known Problems Mother     No Known Problems Father         Social History     Tobacco Use    Smoking status: Former Smoker     Packs/day: 1.00     Years: 12.00     Pack years: 12.00     Types: Cigarettes     Start date: 1965     Last attempt to quit: 1974     Years since quittin.1    Smokeless tobacco: Never Used   Substance Use Topics    Alcohol use: No    Drug use: Yes     Comment: Norco, Lorazepam        Objective  Vitals:    20 0943   BP: 104/70   Site: Right Upper Arm   Position: Sitting   Cuff Size: Medium Adult   Pulse: 66   Temp: 98.1 °F (36.7 °C)   TempSrc: Temporal   SpO2: 98%   Weight: 129 lb (58.5 kg)   Height: 5' 5\" (1.651 m)        Exam:  Physical Exam  Vitals signs reviewed. Constitutional:       General: She is not in acute distress. Appearance: She is well-developed. She is not ill-appearing. Comments: Thin   HENT:      Head: Normocephalic and atraumatic. Right Ear: External ear normal.      Left Ear: External ear normal.      Mouth/Throat:      Pharynx: No oropharyngeal exudate. Eyes:      General: No scleral icterus. Right eye: No discharge. Left eye: No discharge. Conjunctiva/sclera: Conjunctivae normal.      Pupils: Pupils are equal, round, and reactive to light. Neck:      Musculoskeletal: Normal range of motion and neck supple. No neck rigidity. Thyroid: No thyromegaly.    Cardiovascular:      Rate and Rhythm: Normal rate and regular rhythm. Heart sounds: Normal heart sounds. No murmur. No friction rub. No gallop. Pulmonary:      Effort: Pulmonary effort is normal. No respiratory distress. Breath sounds: Normal breath sounds. No wheezing or rales. Chest:      Chest wall: No tenderness. Abdominal:      General: Bowel sounds are normal. There is no distension. Palpations: Abdomen is soft. There is no mass. Tenderness: There is no abdominal tenderness. There is no guarding or rebound. Musculoskeletal: Normal range of motion. General: Tenderness present. No deformity. Lymphadenopathy:      Cervical: No cervical adenopathy. Skin:     General: Skin is warm and dry. Coloration: Skin is not pale. Findings: No erythema or rash. Neurological:      Mental Status: She is alert and oriented to person, place, and time. Cranial Nerves: No cranial nerve deficit. Sensory: No sensory deficit. Deep Tendon Reflexes: Reflexes normal.      Comments: Intention tremor   Psychiatric:         Behavior: Behavior normal.         Thought Content: Thought content normal.         Judgment: Judgment normal.      Comments: Mood is sad          Last labs reviewed. ASSESSMENT & PLAN :   Problem List        Circulatory    Hypertension     Blood pressures are stable. Continue medications and monitor blood pressures at home. Call office if systolics are over 507 over diastolics over 90. Relevant Medications    lisinopril (PRINIVIL;ZESTRIL) 20 MG tablet       Digestive    Gastroesophageal reflux disease     Continue PPI         Relevant Medications    ondansetron (ZOFRAN ODT) 4 MG disintegrating tablet    ondansetron (ZOFRAN) 4 MG tablet    linaCLOtide (LINZESS) 72 MCG CAPS capsule    dicyclomine (BENTYL) 10 MG capsule    Drug-induced constipation     Try and give herself enemas at about every 3 days. On the days that she is does not use an enema she can use a Dulcolax tablet or suppository.

## 2020-07-24 ASSESSMENT — ENCOUNTER SYMPTOMS
CONSTIPATION: 1
PHOTOPHOBIA: 0
EYE DISCHARGE: 0
FACIAL SWELLING: 0
SORE THROAT: 0
SHORTNESS OF BREATH: 0
TROUBLE SWALLOWING: 0
BACK PAIN: 1
NAUSEA: 0
RHINORRHEA: 0
EYE PAIN: 0
VOMITING: 0
STRIDOR: 0
ANAL BLEEDING: 0
WHEEZING: 0
ABDOMINAL PAIN: 0
BLOOD IN STOOL: 0
COLOR CHANGE: 0
DIARRHEA: 0
EYE ITCHING: 0
COUGH: 0

## 2020-07-24 NOTE — ASSESSMENT & PLAN NOTE
Continue mirtazapine to try and stimulate her appetite.   Continue follow-up with her psychiatrist and continue medications

## 2020-07-24 NOTE — ASSESSMENT & PLAN NOTE
Try and give herself enemas at about every 3 days. On the days that she is does not use an enema she can use a Dulcolax tablet or suppository. Continue with her MiraLAX increase fluids and Metamucil.

## 2020-07-24 NOTE — ASSESSMENT & PLAN NOTE
UA and urine culture.   Empirically start on Macrobid 1 twice daily for 7 days until results of culture and sensitivity are back

## 2020-07-25 LAB — URINE CULTURE, ROUTINE: NORMAL

## 2020-09-08 ENCOUNTER — TELEPHONE (OUTPATIENT)
Dept: PRIMARY CARE CLINIC | Age: 75
End: 2020-09-08

## 2020-09-08 NOTE — TELEPHONE ENCOUNTER
There was a second order which I put in the telephone call regarding ultrasound of the thyroid.   Please cancel that as the patient is already scheduled for an ultrasound of the thyroid

## 2020-09-08 NOTE — TELEPHONE ENCOUNTER
Call patient. Bone density scan shows osteopenia or thinning bones in the hip.   Improved from previous study

## 2020-09-17 ENCOUNTER — TELEPHONE (OUTPATIENT)
Dept: PRIMARY CARE CLINIC | Age: 75
End: 2020-09-17

## 2020-09-22 ENCOUNTER — OFFICE VISIT (OUTPATIENT)
Dept: PRIMARY CARE CLINIC | Age: 75
End: 2020-09-22
Payer: MEDICARE

## 2020-09-22 ENCOUNTER — HOSPITAL ENCOUNTER (OUTPATIENT)
Age: 75
Discharge: HOME | End: 2020-09-24
Payer: MEDICARE

## 2020-09-22 VITALS
HEIGHT: 63 IN | DIASTOLIC BLOOD PRESSURE: 68 MMHG | BODY MASS INDEX: 21.9 KG/M2 | WEIGHT: 123.6 LBS | RESPIRATION RATE: 16 BRPM | TEMPERATURE: 97.6 F | SYSTOLIC BLOOD PRESSURE: 120 MMHG

## 2020-09-22 PROBLEM — R73.9 HYPERGLYCEMIA: Status: ACTIVE | Noted: 2020-09-22

## 2020-09-22 LAB
ALBUMIN SERPL-MCNC: 4.8 G/DL (ref 3.5–5.2)
ALP BLD-CCNC: 38 U/L (ref 35–104)
ALT SERPL-CCNC: 9 U/L (ref 0–32)
ANION GAP SERPL CALCULATED.3IONS-SCNC: 18 MMOL/L (ref 7–16)
AST SERPL-CCNC: 19 U/L (ref 0–31)
BILIRUB SERPL-MCNC: 0.4 MG/DL (ref 0–1.2)
BUN BLDV-MCNC: 10 MG/DL (ref 8–23)
CALCIUM SERPL-MCNC: 9.5 MG/DL (ref 8.6–10.2)
CHLORIDE BLD-SCNC: 98 MMOL/L (ref 98–107)
CO2: 22 MMOL/L (ref 22–29)
CREAT SERPL-MCNC: 0.9 MG/DL (ref 0.5–1)
CREATININE URINE: 110 MG/DL (ref 29–226)
GFR AFRICAN AMERICAN: >60
GFR NON-AFRICAN AMERICAN: >60 ML/MIN/1.73
GLUCOSE BLD-MCNC: 89 MG/DL (ref 74–99)
HBA1C MFR BLD: 5.6 % (ref 4–5.6)
MICROALBUMIN UR-MCNC: <12 MG/L
MICROALBUMIN/CREAT UR-RTO: ABNORMAL (ref 0–30)
PARATHYROID HORMONE INTACT: 30 PG/ML (ref 15–65)
POTASSIUM SERPL-SCNC: 4.5 MMOL/L (ref 3.5–5)
SEDIMENTATION RATE, ERYTHROCYTE: 2 MM/HR (ref 0–20)
SODIUM BLD-SCNC: 138 MMOL/L (ref 132–146)
T3 FREE: 3.2 PG/ML (ref 2–4.4)
T4 FREE: 1.18 NG/DL (ref 0.93–1.7)
TOTAL PROTEIN: 7.6 G/DL (ref 6.4–8.3)
TSH SERPL DL<=0.05 MIU/L-ACNC: 2.75 UIU/ML (ref 0.27–4.2)
VITAMIN D 25-HYDROXY: 47 NG/ML (ref 30–100)

## 2020-09-22 PROCEDURE — 82044 UR ALBUMIN SEMIQUANTITATIVE: CPT

## 2020-09-22 PROCEDURE — 83036 HEMOGLOBIN GLYCOSYLATED A1C: CPT

## 2020-09-22 PROCEDURE — G8420 CALC BMI NORM PARAMETERS: HCPCS | Performed by: INTERNAL MEDICINE

## 2020-09-22 PROCEDURE — 99214 OFFICE O/P EST MOD 30 MIN: CPT | Performed by: INTERNAL MEDICINE

## 2020-09-22 PROCEDURE — 85651 RBC SED RATE NONAUTOMATED: CPT

## 2020-09-22 PROCEDURE — 84481 FREE ASSAY (FT-3): CPT

## 2020-09-22 PROCEDURE — 80053 COMPREHEN METABOLIC PANEL: CPT

## 2020-09-22 PROCEDURE — G8427 DOCREV CUR MEDS BY ELIG CLIN: HCPCS | Performed by: INTERNAL MEDICINE

## 2020-09-22 PROCEDURE — 83970 ASSAY OF PARATHORMONE: CPT

## 2020-09-22 PROCEDURE — 4040F PNEUMOC VAC/ADMIN/RCVD: CPT | Performed by: INTERNAL MEDICINE

## 2020-09-22 PROCEDURE — 1090F PRES/ABSN URINE INCON ASSESS: CPT | Performed by: INTERNAL MEDICINE

## 2020-09-22 PROCEDURE — 82306 VITAMIN D 25 HYDROXY: CPT

## 2020-09-22 PROCEDURE — 1123F ACP DISCUSS/DSCN MKR DOCD: CPT | Performed by: INTERNAL MEDICINE

## 2020-09-22 PROCEDURE — 36415 COLL VENOUS BLD VENIPUNCTURE: CPT

## 2020-09-22 PROCEDURE — 82570 ASSAY OF URINE CREATININE: CPT

## 2020-09-22 PROCEDURE — 1036F TOBACCO NON-USER: CPT | Performed by: INTERNAL MEDICINE

## 2020-09-22 PROCEDURE — 84439 ASSAY OF FREE THYROXINE: CPT

## 2020-09-22 PROCEDURE — 84443 ASSAY THYROID STIM HORMONE: CPT

## 2020-09-22 PROCEDURE — G8399 PT W/DXA RESULTS DOCUMENT: HCPCS | Performed by: INTERNAL MEDICINE

## 2020-09-22 RX ORDER — LISINOPRIL 20 MG/1
20 TABLET ORAL DAILY
Qty: 90 TABLET | Refills: 2 | Status: SHIPPED | OUTPATIENT
Start: 2020-09-22 | End: 2021-02-08 | Stop reason: SDUPTHER

## 2020-09-22 RX ORDER — SIMVASTATIN 10 MG
10 TABLET ORAL NIGHTLY
Qty: 90 TABLET | Refills: 3 | Status: SHIPPED | OUTPATIENT
Start: 2020-09-22 | End: 2021-02-08 | Stop reason: SDUPTHER

## 2020-09-22 RX ORDER — ERGOCALCIFEROL 1.25 MG/1
50000 CAPSULE ORAL
Qty: 6 CAPSULE | Refills: 2 | Status: SHIPPED | OUTPATIENT
Start: 2020-09-22 | End: 2021-08-26 | Stop reason: SDUPTHER

## 2020-09-22 ASSESSMENT — ENCOUNTER SYMPTOMS
PHOTOPHOBIA: 0
ROS SKIN COMMENTS: DRY
FACIAL SWELLING: 0
SHORTNESS OF BREATH: 0
TROUBLE SWALLOWING: 0
RHINORRHEA: 0
EYE ITCHING: 0
CONSTIPATION: 1
COUGH: 0
BLOOD IN STOOL: 0
EYE PAIN: 0
DIARRHEA: 0
NAUSEA: 0
STRIDOR: 0
ANAL BLEEDING: 0
SORE THROAT: 0
BACK PAIN: 1
COLOR CHANGE: 0
EYE DISCHARGE: 0
ABDOMINAL PAIN: 0
WHEEZING: 0
VOMITING: 0

## 2020-09-22 NOTE — ASSESSMENT & PLAN NOTE
TSH and free T4,  TSH free T3. Check a nuclear medicine uptake and scan in view of her recurrent symptoms suggestive of hypothyroidism monitor.   Recheck

## 2020-09-22 NOTE — ASSESSMENT & PLAN NOTE
Continue on regimen of daily MiraLAX and Metamucil, about every other day use a Dulcolax suppository and about every third day as small volume saline enema

## 2020-09-22 NOTE — PROGRESS NOTES
2020    Name: Anna Lopez : 1945 Sex: female  Age: 66 y.o. Subjective:  Chief Complaint   Patient presents with    Constipation     with nausea        HPI     She is here with unintentional weight loss. She is lost about 35  pounds since 2020. She is just not hungry. She is has dysphagia which apparently was worse after GI stretched her esophagus. She went to see Dr. Cori Menjivar. Reviewed his report. He did not feel she needed a repeat colonoscopy. He recommended continuing with a single small volume saline enema for relief of her chronic narcotic induced constipation. She also takes Metamucil and MiraLAX daily. And continues on Dulcolax suppository as needed. Despite this she still has chronic constipation. She was tried on Linzess but this was too expensive. She does not remember trying Amitiza although this was noted on a GI consult this year    She had a ultrasound of her thyroid to monitor her multinodular goiter. 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 Previous free T4 and TSH have been normal.    She complains of increased irritability, dry skin, thin nails and chronic fatigue. All symptoms of possible underlying hypothyroidism. We will check blood work again and include a free T3. We will also check a nuclear scan. She has been referred to Dr Emilie Timmons for further evaluation. We will send him all the results of the test.      She continues to see pain management who has her on opioids. If she does not take the opioid she is unable to function because of chronic back pain. She has chronic respiratory failure with sleep-related hypoxia. She refuses to use any oxygen or use any CPAP or BiPAP. She has a history of osteoporosis on Prolia every 6 months. Recent DEXA scan showed osteopenia. .    She sees Dr. Louise Suarez for follow-up on her breast cancer.   She will have a repeat CT scan of her chest in 2 months as she is up abnormalities on the last one. She has a history of CKD stage III. We will check for secondary hyperparathyroidism    She complains of urinary frequency but cannot take medication because it worsens constipation    Review of Systems   Constitutional: Positive for appetite change and unexpected weight change. Negative for fatigue. HENT: Negative for congestion, ear pain, facial swelling, rhinorrhea, sore throat, tinnitus and trouble swallowing. Thinning hais   Eyes: Negative for photophobia, pain, discharge, itching and visual disturbance. Respiratory: Negative for cough, shortness of breath, wheezing and stridor. Cardiovascular: Negative for chest pain, palpitations and leg swelling. Gastrointestinal: Positive for constipation. Negative for abdominal pain, anal bleeding, blood in stool, diarrhea, nausea and vomiting. Dysphagia   Endocrine: Negative for cold intolerance, heat intolerance, polydipsia, polyphagia and polyuria. Genitourinary: Positive for dysuria and frequency. Negative for difficulty urinating, flank pain, hematuria and urgency. Musculoskeletal: Positive for arthralgias and back pain. Negative for gait problem, joint swelling and myalgias. Skin: Negative for color change, pallor and rash. dry   Allergic/Immunologic: Negative for environmental allergies and food allergies. Neurological: Positive for tremors. Negative for dizziness, seizures, syncope, speech difficulty, light-headedness, numbness and headaches. Hematological: Negative for adenopathy. Does not bruise/bleed easily. Psychiatric/Behavioral: Positive for confusion. Negative for agitation, behavioral problems, sleep disturbance and suicidal ideas. The patient is nervous/anxious.          Irritable            Current Outpatient Medications:     lisinopril (PRINIVIL;ZESTRIL) 20 MG tablet, Take 1 tablet by mouth daily, Disp: 90 tablet, Rfl: 2    simvastatin (ZOCOR) 10 MG tablet, Take 1 tablet by mouth nightly, Disp: 90 tablet, Rfl: 3    vitamin D (ERGOCALCIFEROL) 1.25 MG (05247 UT) CAPS capsule, Take 1 capsule by mouth every 14 days, Disp: 6 capsule, Rfl: 2    dicyclomine (BENTYL) 10 MG capsule, Take 1 capsule by mouth 4 times daily (before meals and nightly), Disp: 360 capsule, Rfl: 0    denosumab (PROLIA) 60 MG/ML SOSY SC injection, Inject 1 mL into the skin every 6 months, Disp: 1 mL, Rfl: 1    traZODone (DESYREL) 50 MG tablet, Take 25 mg by mouth nightly, Disp: , Rfl:     mirtazapine (REMERON) 30 MG tablet, Take 30 mg by mouth nightly, Disp: , Rfl:     LORazepam (ATIVAN) 0.5 MG tablet, Take 1 mg by mouth 3 times daily. , Disp: , Rfl:     aspirin (ASPIRIN ADULT LOW DOSE) 81 MG EC tablet, , Disp: , Rfl:     doxepin (SINEQUAN) 10 MG capsule, Take 1 capsule by mouth 2 times daily, Disp: 60 capsule, Rfl: 2    ondansetron (ZOFRAN) 4 MG tablet, Take 1 tablet by mouth 3 times daily as needed for Nausea or Vomiting, Disp: 30 tablet, Rfl: 0    ondansetron (ZOFRAN ODT) 4 MG disintegrating tablet, Take 1 tablet by mouth every 8 hours as needed for Nausea or Vomiting, Disp: 40 tablet, Rfl: 3    Multiple Vitamins-Minerals (MULTIVITAMIN ADULT) TABS, Take by mouth daily, Disp: , Rfl:     HYDROcodone-acetaminophen (NORCO)  MG per tablet, Take 1 tablet by mouth every 8 hours as needed for Pain ., Disp: , Rfl:      Allergies   Allergen Reactions    Epinephrine Other (See Comments)     PATIENT SATES \"HEART PALPATATIONS. \"     Adhesive Tape Rash        Past Medical History:   Diagnosis Date    Abnormal Pap smear of cervix     Chronic obstructive lung disease ([facility] [address] 7/9/2018    Chronic pain     Chronic respiratory failure with hypoxia (HCC) 6/6/2019    Depression     Hyperlipidemia     Hypertension     Osteoarthritis     Osteoporosis     Palpitations     Sleep apnea     doesnt use her cpap       Health Maintenance Due   Topic Date Due    DTaP/Tdap/Td vaccine (1 - Tdap) 10/10/1962    Flu vaccine (1) 2020        Patient Active Problem List   Diagnosis    Low back pain    Degeneration of lumbar or lumbosacral intervertebral disc    Spinal stenosis    Depression    Chronic pain    Gastroesophageal reflux disease    Hyperlipidemia    Hypertension    Osteoporosis    Sleep apnea    Thyroid nodule    Uterine cancer (HCC)    Chronic respiratory failure with hypoxia (HCC)    Hypoxia, sleep related    Need for tetanus, diphtheria, and acellular pertussis (Tdap) vaccine    Severe episode of recurrent major depressive disorder, without psychotic features ([facility] [address]    Drug-induced constipation    Stage 3 chronic kidney disease (HCC)    Nausea    Abnormal urine odor    Urinary frequency    Hyperglycemia        Past Surgical History:   Procedure Laterality Date    BACK SURGERY  2017    T7 spinal cord stimulator with Dr. Polina Landon      breast reconstruction w/implants    COLONOSCOPY      COSMETIC SURGERY      jaw surgery    ENDOSCOPY, COLON, DIAGNOSTIC      HYSTERECTOMY  2004    complete    MASTECTOMY  ,     left and right    OTHER SURGICAL HISTORY N/A 2017    stage 1  5 day spinal cord stimulator trial [employer]    SKIN BIOPSY      moles    TONSILLECTOMY          Family History   Problem Relation Age of Onset    No Known Problems Mother     No Known Problems Father         Social History     Tobacco Use    Smoking status: Former Smoker     Packs/day: 1.00     Years: 12.00     Pack years: 12.00     Types: Cigarettes     Start date: 1965     Last attempt to quit: 1974     Years since quittin.3    Smokeless tobacco: Never Used   Substance Use Topics    Alcohol use: No    Drug use: Yes     Comment: Norco, Lorazepam        Objective  Vitals:    20 0738   BP: 120/68   Resp: 16   Temp: 97.6 °F (36.4 °C)   Weight: 123 lb 9.6 oz (56.1 kg)   Height: 5' 3\" (1.6 m)        Exam:  Physical Exam  Vitals signs Problem List        Circulatory    Hypertension     Same medications         Relevant Medications    lisinopril (PRINIVIL;ZESTRIL) 20 MG tablet    Other Relevant Orders    Comprehensive Metabolic Panel       Respiratory    Hypoxia, sleep related     Patient refuses oxygen            Digestive    Drug-induced constipation - Primary     Continue on regimen of daily MiraLAX and Metamucil, about every other day use a Dulcolax suppository and about every third day as small volume saline enema            Endocrine    Thyroid nodule     TSH and free T4,  TSH free T3. Check a nuclear medicine uptake and scan in view of her recurrent symptoms suggestive of hypothyroidism monitor. Recheck         Relevant Orders    T4, Free    TSH without Reflex    T3, FREE    NM THYROID UPTAKE AND SCAN       Musculoskeletal and Integument    Osteoporosis     Continue Prolia per cancer center. Continue calcium and vitamin D3         Relevant Medications    denosumab (PROLIA) 60 MG/ML SOSY SC injection    vitamin D (ERGOCALCIFEROL) 1.25 MG (05192 UT) CAPS capsule    Other Relevant Orders    Sedimentation Rate       Genitourinary    Stage 3 chronic kidney disease (HCC)     Monitor. Avoid NSAID use. Check intact PTH         Relevant Orders    PTH, Intact    Sedimentation Rate    Vitamin D 25 Hydroxy    Microalbumin / Creatinine Urine Ratio       Other    Hyperlipidemia     Continue simvastatin, monitor lipids         Relevant Medications    aspirin (ASPIRIN ADULT LOW DOSE) 81 MG EC tablet    lisinopril (PRINIVIL;ZESTRIL) 20 MG tablet    simvastatin (ZOCOR) 10 MG tablet    Hyperglycemia     Check hemoglobin A1c         Relevant Orders    Hemoglobin A1C           Return in about 5 weeks (around 10/27/2020). Roxi Orozco DO  2020    Name: Bulmaro Esther : 1945 Sex: female  Age: 66 y.o.    Subjective:  Chief Complaint   Patient presents with    Constipation     with nausea        HPI     She is here with unintentional weight loss. She is lost about 20 pounds since January 2020. She is just not hungry. She is has dysphagia which apparently was worse after GI stretched her esophagus. She went to see Dr. Katelynn Castillo. Reviewed his report. He did not feel she needed a repeat colonoscopy. He recommended continuing with a single small volume saline enema for relief of her chronic narcotic induced constipation. She continues to see pain management who has her on opioids. If she does not take the opioid she is unable to function because of chronic back pain. She has chronic respiratory failure with sleep-related hypoxia. She refuses to use any oxygen or use any CPAP or BiPAP. She has a history of osteoporosis on Prolia every 6 months. She will need a DEXA scan in the near future. She has a history of a thyroid nodule and will need an ultrasound of her thyroid. She sees Dr. Kleber Crenshaw for follow-up on her breast cancer. She will have a repeat CT scan of her chest in 2 months as she is up abnormalities on the last one. She has a history of CKD stage III. She complains of urinary frequency and dysuria. We will check a urine culture. Review of Systems   Constitutional: Positive for appetite change and unexpected weight change. Negative for fatigue. HENT: Negative for congestion, ear pain, facial swelling, rhinorrhea, sore throat, tinnitus and trouble swallowing. Eyes: Negative for photophobia, pain, discharge, itching and visual disturbance. Respiratory: Negative for cough, shortness of breath, wheezing and stridor. Cardiovascular: Negative for chest pain, palpitations and leg swelling. Gastrointestinal: Positive for constipation. Negative for abdominal pain, anal bleeding, blood in stool, diarrhea, nausea and vomiting. Dysphagia   Endocrine: Negative for cold intolerance, heat intolerance, polydipsia, polyphagia and polyuria.    Genitourinary: Positive for dysuria and MASTECTOMY  ,     left and right    OTHER SURGICAL HISTORY N/A 2017    stage 1  5 day spinal cord stimulator trial [facility]    SKIN BIOPSY      moles    TONSILLECTOMY          Family History   Problem Relation Age of Onset    No Known Problems Mother     No Known Problems Father         Social History     Tobacco Use    Smoking status: Former Smoker     Packs/day: 1.00     Years: 12.00     Pack years: 12.00     Types: Cigarettes     Start date: 1965     Last attempt to quit: 1974     Years since quittin.3    Smokeless tobacco: Never Used   Substance Use Topics    Alcohol use: No    Drug use: Yes     Comment: Norco, Lorazepam        Objective  Vitals:    20 0738   BP: 120/68   Resp: 16   Temp: 97.6 °F (36.4 °C)   Weight: 123 lb 9.6 oz (56.1 kg)   Height: 5' 3\" (1.6 m)        Exam:  Physical Exam  Vitals signs reviewed. Constitutional:       General: She is not in acute distress. Appearance: She is well-developed. She is not ill-appearing. Comments: Thin   HENT:      Head: Normocephalic and atraumatic. Right Ear: External ear normal.      Left Ear: External ear normal.      Mouth/Throat:      Pharynx: No oropharyngeal exudate. Eyes:      General: No scleral icterus. Right eye: No discharge. Left eye: No discharge. Conjunctiva/sclera: Conjunctivae normal.      Pupils: Pupils are equal, round, and reactive to light. Neck:      Musculoskeletal: Normal range of motion and neck supple. No neck rigidity. Thyroid: No thyromegaly. Cardiovascular:      Rate and Rhythm: Normal rate and regular rhythm. Heart sounds: Normal heart sounds. No murmur. No friction rub. No gallop. Pulmonary:      Effort: Pulmonary effort is normal. No respiratory distress. Breath sounds: Normal breath sounds. No wheezing or rales. Chest:      Chest wall: No tenderness.    Abdominal:      General: Bowel sounds are normal. There is no distension. Palpations: Abdomen is soft. There is no mass. Tenderness: There is no abdominal tenderness. There is no guarding or rebound. Musculoskeletal: Normal range of motion. General: Tenderness present. No deformity. Lymphadenopathy:      Cervical: No cervical adenopathy. Skin:     General: Skin is warm and dry. Coloration: Skin is not pale. Findings: No erythema or rash. Neurological:      Mental Status: She is alert and oriented to person, place, and time. Cranial Nerves: No cranial nerve deficit. Sensory: No sensory deficit. Deep Tendon Reflexes: Reflexes normal.      Comments: Intention tremor   Psychiatric:         Behavior: Behavior normal.         Thought Content: Thought content normal.         Judgment: Judgment normal.      Comments: Mood is sad          Last labs reviewed. ASSESSMENT & PLAN :   Problem List        Circulatory    Hypertension     Same medications         Relevant Medications    lisinopril (PRINIVIL;ZESTRIL) 20 MG tablet    Other Relevant Orders    Comprehensive Metabolic Panel       Respiratory    Hypoxia, sleep related     Patient refuses oxygen            Digestive    Drug-induced constipation - Primary     Continue on regimen of daily MiraLAX and Metamucil, about every other day use a Dulcolax suppository and about every third day as small volume saline enema            Endocrine    Thyroid nodule     TSH and free T4,  TSH free T3. Check a nuclear medicine uptake and scan in view of her recurrent symptoms suggestive of hypothyroidism monitor. Recheck         Relevant Orders    T4, Free    TSH without Reflex    T3, FREE    NM THYROID UPTAKE AND SCAN       Musculoskeletal and Integument    Osteoporosis     Continue Prolia per cancer center.   Continue calcium and vitamin D3         Relevant Medications    denosumab (PROLIA) 60 MG/ML SOSY SC injection    vitamin D (ERGOCALCIFEROL) 1.25 MG (28740 UT) CAPS capsule    Other

## 2020-10-01 ENCOUNTER — NURSE ONLY (OUTPATIENT)
Dept: PRIMARY CARE CLINIC | Age: 75
End: 2020-10-01
Payer: MEDICARE

## 2020-10-01 PROCEDURE — 90694 VACC AIIV4 NO PRSRV 0.5ML IM: CPT | Performed by: INTERNAL MEDICINE

## 2020-10-01 PROCEDURE — G0008 ADMIN INFLUENZA VIRUS VAC: HCPCS | Performed by: INTERNAL MEDICINE

## 2020-10-01 NOTE — PROGRESS NOTES
Vaccine Information Sheet, \"Influenza - Inactivated\"  given to [employer], or parent/legal guardian of  [place] and verbalized understanding. Patient responses:    Have you ever had a reaction to a flu vaccine? No  Do you have any current illness? No  Have you ever had Guillian [place] Syndrome? No  Do you have a serious allergy to any of the follow: Neomycin, Polymyxin, Thimerosal, eggs or egg products? No    Flu vaccine given per order. Please see immunization tab. Risks and benefits explained. Current VIS given.       Immunizations Administered     Name Date Dose Route    Influenza, Quadv, adjuvanted, 65 yrs +, IM, PF (Fluad) 10/1/2020 0.5 mL Intramuscular    Site: Deltoid- Left    Lot: [id number]    NDC: 34545-519-32

## 2020-10-05 ENCOUNTER — TELEPHONE (OUTPATIENT)
Dept: PRIMARY CARE CLINIC | Age: 75
End: 2020-10-05

## 2020-10-05 RX ORDER — PROMETHAZINE HYDROCHLORIDE 12.5 MG/1
12.5 TABLET ORAL EVERY 6 HOURS PRN
Qty: 20 TABLET | Refills: 1 | Status: SHIPPED
Start: 2020-10-05 | End: 2020-10-08

## 2020-10-05 NOTE — TELEPHONE ENCOUNTER
Pt is having terrible nausea and the medication you have given her is no longer working.   would like you to get a hold of him  [phone]

## 2020-10-08 ENCOUNTER — OFFICE VISIT (OUTPATIENT)
Dept: PRIMARY CARE CLINIC | Age: 75
End: 2020-10-08
Payer: MEDICARE

## 2020-10-08 VITALS
TEMPERATURE: 97.1 F | WEIGHT: 123 LBS | DIASTOLIC BLOOD PRESSURE: 68 MMHG | SYSTOLIC BLOOD PRESSURE: 124 MMHG | BODY MASS INDEX: 20.49 KG/M2 | HEART RATE: 77 BPM | HEIGHT: 65 IN | OXYGEN SATURATION: 97 %

## 2020-10-08 PROCEDURE — 4040F PNEUMOC VAC/ADMIN/RCVD: CPT | Performed by: INTERNAL MEDICINE

## 2020-10-08 PROCEDURE — G8420 CALC BMI NORM PARAMETERS: HCPCS | Performed by: INTERNAL MEDICINE

## 2020-10-08 PROCEDURE — 1090F PRES/ABSN URINE INCON ASSESS: CPT | Performed by: INTERNAL MEDICINE

## 2020-10-08 PROCEDURE — G8399 PT W/DXA RESULTS DOCUMENT: HCPCS | Performed by: INTERNAL MEDICINE

## 2020-10-08 PROCEDURE — 1036F TOBACCO NON-USER: CPT | Performed by: INTERNAL MEDICINE

## 2020-10-08 PROCEDURE — G8427 DOCREV CUR MEDS BY ELIG CLIN: HCPCS | Performed by: INTERNAL MEDICINE

## 2020-10-08 PROCEDURE — 1123F ACP DISCUSS/DSCN MKR DOCD: CPT | Performed by: INTERNAL MEDICINE

## 2020-10-08 PROCEDURE — G8484 FLU IMMUNIZE NO ADMIN: HCPCS | Performed by: INTERNAL MEDICINE

## 2020-10-08 PROCEDURE — 99214 OFFICE O/P EST MOD 30 MIN: CPT | Performed by: INTERNAL MEDICINE

## 2020-10-08 RX ORDER — ALBUTEROL SULFATE 90 UG/1
2 AEROSOL, METERED RESPIRATORY (INHALATION) 4 TIMES DAILY PRN
Qty: 1 INHALER | Refills: 5 | Status: SHIPPED
Start: 2020-10-08 | End: 2020-10-08 | Stop reason: SDUPTHER

## 2020-10-08 RX ORDER — DICYCLOMINE HYDROCHLORIDE 10 MG/1
CAPSULE ORAL
Qty: 60 CAPSULE | Refills: 2 | Status: SHIPPED | OUTPATIENT
Start: 2020-10-08 | End: 2021-08-26

## 2020-10-08 RX ORDER — PROMETHAZINE HYDROCHLORIDE 12.5 MG/1
12.5 TABLET ORAL EVERY 6 HOURS PRN
Qty: 20 TABLET | Refills: 1 | Status: CANCELLED | OUTPATIENT
Start: 2020-10-08 | End: 2020-10-15

## 2020-10-08 RX ORDER — PROMETHAZINE HYDROCHLORIDE 25 MG/1
25 TABLET ORAL 4 TIMES DAILY PRN
Qty: 20 TABLET | Refills: 2 | Status: SHIPPED | OUTPATIENT
Start: 2020-10-08 | End: 2020-10-15

## 2020-10-08 RX ORDER — ALBUTEROL SULFATE 90 UG/1
2 AEROSOL, METERED RESPIRATORY (INHALATION) 4 TIMES DAILY PRN
Qty: 1 INHALER | Refills: 5 | Status: SHIPPED
Start: 2020-10-08 | End: 2021-02-08 | Stop reason: ALTCHOICE

## 2020-10-08 ASSESSMENT — ENCOUNTER SYMPTOMS
NAUSEA: 0
ROS SKIN COMMENTS: DRY
ABDOMINAL PAIN: 0
SORE THROAT: 0
CONSTIPATION: 1
COLOR CHANGE: 0
RHINORRHEA: 0
PHOTOPHOBIA: 0
EYE ITCHING: 0
ANAL BLEEDING: 0
EYE DISCHARGE: 0
FACIAL SWELLING: 0
SHORTNESS OF BREATH: 0
TROUBLE SWALLOWING: 0
COUGH: 0
BLOOD IN STOOL: 0
STRIDOR: 0
BACK PAIN: 1
DIARRHEA: 0
VOMITING: 0
WHEEZING: 0
EYE PAIN: 0

## 2020-10-08 NOTE — ASSESSMENT & PLAN NOTE
Patient refuses nocturnal oxygen. She refuses CPAP.   Given a refill on her Ventolin HFA which she claims helps her breathe at night

## 2020-10-08 NOTE — PROGRESS NOTES
lorazepam.      She has chronic respiratory failure with sleep-related hypoxia. She refuses to use any oxygen or use any CPAP or BiPAP. She does use a Ventolin HFA with a spacer and this helps her breathe better at night. She would like a refill. She has a history of osteoporosis on Prolia every 6 months. Recent DEXA scan showed osteopenia. .    She sees Dr. Luh Barrios for follow-up on her breast cancer. She will have a repeat CT scan of her chest in 2 months as she had some abnormalities on the last one. She has a history of CKD stage III. We will check for secondary hyperparathyroidism. Calcium was normal at 9.5 and intact PTH was normal at 30. Unlikely to have hyperparathyroidism    She complains of urinary frequency but cannot take medication because it worsens constipation    Review of Systems   Constitutional: Positive for appetite change and unexpected weight change. Negative for fatigue. HENT: Negative for congestion, ear pain, facial swelling, rhinorrhea, sore throat, tinnitus and trouble swallowing. Thinning hais   Eyes: Negative for photophobia, pain, discharge, itching and visual disturbance. Respiratory: Negative for cough, shortness of breath, wheezing and stridor. Cardiovascular: Negative for chest pain, palpitations and leg swelling. Gastrointestinal: Positive for constipation. Negative for abdominal pain, anal bleeding, blood in stool, diarrhea, nausea and vomiting. Dysphagia   Endocrine: Negative for cold intolerance, heat intolerance, polydipsia, polyphagia and polyuria. Genitourinary: Positive for dysuria and frequency. Negative for difficulty urinating, flank pain, hematuria and urgency. Musculoskeletal: Positive for arthralgias and back pain. Negative for gait problem, joint swelling and myalgias. Skin: Negative for color change, pallor and rash. dry   Allergic/Immunologic: Negative for environmental allergies and food allergies.    Neurological: Positive for tremors. Negative for dizziness, seizures, syncope, speech difficulty, light-headedness, numbness and headaches. Hematological: Negative for adenopathy. Does not bruise/bleed easily. Psychiatric/Behavioral: Positive for confusion. Negative for agitation, behavioral problems, sleep disturbance and suicidal ideas. The patient is nervous/anxious. Irritable            Current Outpatient Medications:     dicyclomine (BENTYL) 10 MG capsule, Take one q 6 hours as needed for abdominal cramps, Disp: 60 capsule, Rfl: 2    promethazine (PHENERGAN) 25 MG tablet, Take 1 tablet by mouth 4 times daily as needed for Nausea, Disp: 20 tablet, Rfl: 2    albuterol sulfate HFA (VENTOLIN HFA) 108 (90 Base) MCG/ACT inhaler, Inhale 2 puffs into the lungs 4 times daily as needed for Wheezing, Disp: 1 Inhaler, Rfl: 5    lisinopril (PRINIVIL;ZESTRIL) 20 MG tablet, Take 1 tablet by mouth daily, Disp: 90 tablet, Rfl: 2    simvastatin (ZOCOR) 10 MG tablet, Take 1 tablet by mouth nightly, Disp: 90 tablet, Rfl: 3    vitamin D (ERGOCALCIFEROL) 1.25 MG (61571 UT) CAPS capsule, Take 1 capsule by mouth every 14 days, Disp: 6 capsule, Rfl: 2    denosumab (PROLIA) 60 MG/ML SOSY SC injection, Inject 1 mL into the skin every 6 months, Disp: 1 mL, Rfl: 1    Multiple Vitamins-Minerals (MULTIVITAMIN ADULT) TABS, Take by mouth daily, Disp: , Rfl:     traZODone (DESYREL) 50 MG tablet, Take 25 mg by mouth nightly, Disp: , Rfl:     mirtazapine (REMERON) 30 MG tablet, Take 30 mg by mouth nightly, Disp: , Rfl:     LORazepam (ATIVAN) 0.5 MG tablet, Take 1 mg by mouth 3 times daily.  , Disp: , Rfl:     aspirin (ASPIRIN ADULT LOW DOSE) 81 MG EC tablet, , Disp: , Rfl:     HYDROcodone-acetaminophen (NORCO)  MG per tablet, Take 1 tablet by mouth every 8 hours as needed for Pain ., Disp: , Rfl:     doxepin (SINEQUAN) 10 MG capsule, Take 1 capsule by mouth 2 times daily, Disp: 60 capsule, Rfl: 2     Allergies   Allergen Reactions    Epinephrine Other (See Comments)     PATIENT SATES \"HEART PALPATATIONS. \"     Adhesive Tape Rash        Past Medical History:   Diagnosis Date    Abnormal Pap smear of cervix     Chronic obstructive lung disease ([facility] [address] 7/9/2018    Chronic pain     Chronic respiratory failure with hypoxia (HCC) 6/6/2019    Depression     Hyperlipidemia     Hypertension     Osteoarthritis     Osteoporosis     Palpitations     Sleep apnea     doesnt use her cpap       Health Maintenance Due   Topic Date Due    DTaP/Tdap/Td vaccine (1 - Tdap) 10/10/1962        Patient Active Problem List   Diagnosis    Low back pain    Degeneration of lumbar or lumbosacral intervertebral disc    Spinal stenosis    Depression    Chronic pain    Gastroesophageal reflux disease    Hyperlipidemia    Hypertension    Osteoporosis    Sleep apnea    Thyroid nodule    Uterine cancer ([facility] [address]    Chronic respiratory failure with hypoxia (HCC)    Hypoxia, sleep related    Need for tetanus, diphtheria, and acellular pertussis (Tdap) vaccine    Severe episode of recurrent major depressive disorder, without psychotic features (HCC)    Drug-induced constipation    Stage 3 chronic kidney disease    Nausea    Abnormal urine odor    Urinary frequency    Hyperglycemia        Past Surgical History:   Procedure Laterality Date    BACK SURGERY  04/19/2017    T7 spinal cord stimulator with Dr. Radha Gilmore      breast reconstruction w/implants    COLONOSCOPY      COSMETIC SURGERY      jaw surgery    ENDOSCOPY, COLON, DIAGNOSTIC      HYSTERECTOMY  2004    complete    MASTECTOMY  2000, 2014    left and right    OTHER SURGICAL HISTORY N/A 02/03/2017    stage 1  5 day spinal cord stimulator trial [facility]    SKIN BIOPSY      moles    TONSILLECTOMY          Family History   Problem Relation Age of Onset    No Known Problems Mother     No Known Problems Father         Social History     Tobacco Use   

## 2020-10-09 ENCOUNTER — TELEPHONE (OUTPATIENT)
Dept: PRIMARY CARE CLINIC | Age: 75
End: 2020-10-09

## 2020-10-09 NOTE — TELEPHONE ENCOUNTER
Would like an order sent to [facility] for a portable oxygen tank to catch her breathe after she is sleeping.

## 2020-10-19 ENCOUNTER — TELEPHONE (OUTPATIENT)
Dept: PRIMARY CARE CLINIC | Age: 75
End: 2020-10-19

## 2020-10-19 NOTE — TELEPHONE ENCOUNTER
Called patient to follow up on Endocrine referral, spoke with her , notified him we had sent everything over to Dr. Estrella Rivera office on Oct 12th, requesting to see if patient had received a call from them to schedule, patient's  confirmed patient has an appointment in November.

## 2020-10-27 ENCOUNTER — OFFICE VISIT (OUTPATIENT)
Dept: PRIMARY CARE CLINIC | Age: 75
End: 2020-10-27
Payer: MEDICARE

## 2020-10-27 VITALS
HEIGHT: 65 IN | OXYGEN SATURATION: 96 % | HEART RATE: 80 BPM | BODY MASS INDEX: 20.49 KG/M2 | WEIGHT: 123 LBS | RESPIRATION RATE: 18 BRPM | TEMPERATURE: 96.1 F | SYSTOLIC BLOOD PRESSURE: 122 MMHG | DIASTOLIC BLOOD PRESSURE: 74 MMHG

## 2020-10-27 PROCEDURE — 99214 OFFICE O/P EST MOD 30 MIN: CPT | Performed by: INTERNAL MEDICINE

## 2020-10-27 PROCEDURE — 1111F DSCHRG MED/CURRENT MED MERGE: CPT | Performed by: INTERNAL MEDICINE

## 2020-10-27 RX ORDER — AMOXICILLIN 250 MG
2 CAPSULE ORAL DAILY PRN
Qty: 60 TABLET | Refills: 5 | Status: SHIPPED | OUTPATIENT
Start: 2020-10-27 | End: 2021-08-26 | Stop reason: SDUPTHER

## 2020-10-27 RX ORDER — DULOXETIN HYDROCHLORIDE 20 MG/1
CAPSULE, DELAYED RELEASE ORAL
COMMUNITY
Start: 2020-10-20 | End: 2020-12-21

## 2020-10-27 RX ORDER — PROMETHAZINE HYDROCHLORIDE 25 MG/1
TABLET ORAL
COMMUNITY
Start: 2020-10-22 | End: 2020-11-12 | Stop reason: SDUPTHER

## 2020-10-27 ASSESSMENT — ENCOUNTER SYMPTOMS
BACK PAIN: 1
ABDOMINAL PAIN: 0
SORE THROAT: 0
STRIDOR: 0
ROS SKIN COMMENTS: DRY
BLOOD IN STOOL: 0
DIARRHEA: 0
EYE DISCHARGE: 0
FACIAL SWELLING: 0
PHOTOPHOBIA: 0
CONSTIPATION: 1
COLOR CHANGE: 0
VOMITING: 0
NAUSEA: 0
ANAL BLEEDING: 0
EYE ITCHING: 0
EYE PAIN: 0
COUGH: 0
WHEEZING: 0
SHORTNESS OF BREATH: 0
TROUBLE SWALLOWING: 0
RHINORRHEA: 0

## 2020-10-27 NOTE — PROGRESS NOTES
free T4 were still normal.  She has been referred to Dr Joey López for further evaluation. We will send him all the results of the test.      She continues to see pain management who has her on opioids. If she does not take the opioid she is unable to function because of chronic back pain. She says the only time she feels free of pain is when she takes her hydrocodone and her lorazepam 3 times a day. The Phenergan does help her nausea but not as good as she would like it a plan to increase it from 12.5 to 25 mg a day and have her take it about half an hour to an hour after she takes her Norco and her lorazepam.      She has chronic respiratory failure with sleep-related hypoxia. She refuses to use any oxygen or use any CPAP or BiPAP. She does use a Ventolin HFA with a spacer and this helps her breathe better at night. She would like a refill. She has a history of osteoporosis on Prolia every 6 months. Recent DEXA scan showed osteopenia. .    She sees Dr. Elvina Gaucher for follow-up on her breast cancer. She will have a repeat CT scan of her chest in 2 months as she had some abnormalities on the last one. She has a history of CKD stage III. We will check for secondary hyperparathyroidism. Calcium was normal at 9.5 and intact PTH was normal at 30. Unlikely to have hyperparathyroidism    She complains of urinary frequency but cannot take medication because it worsens constipation    Review of Systems   Constitutional: Positive for appetite change and unexpected weight change. Negative for fatigue. HENT: Negative for congestion, ear pain, facial swelling, rhinorrhea, sore throat, tinnitus and trouble swallowing. Thinning hais   Eyes: Negative for photophobia, pain, discharge, itching and visual disturbance. Respiratory: Negative for cough, shortness of breath, wheezing and stridor. Cardiovascular: Negative for chest pain, palpitations and leg swelling.    Gastrointestinal: Positive for constipation. Negative for abdominal pain, anal bleeding, blood in stool, diarrhea, nausea and vomiting. Dysphagia   Endocrine: Negative for cold intolerance, heat intolerance, polydipsia, polyphagia and polyuria. Genitourinary: Positive for dysuria and frequency. Negative for difficulty urinating, flank pain, hematuria and urgency. Musculoskeletal: Positive for arthralgias and back pain. Negative for gait problem, joint swelling and myalgias. Skin: Negative for color change, pallor and rash. dry   Allergic/Immunologic: Negative for environmental allergies and food allergies. Neurological: Positive for tremors. Negative for dizziness, seizures, syncope, speech difficulty, light-headedness, numbness and headaches. Hematological: Negative for adenopathy. Does not bruise/bleed easily. Psychiatric/Behavioral: Positive for confusion. Negative for agitation, behavioral problems, sleep disturbance and suicidal ideas. The patient is nervous/anxious.          Irritable            Current Outpatient Medications:     promethazine (PHENERGAN) 25 MG tablet, TAKE ONE TABLET BY MOUTH FOUR TIMES A DAY AS NEEDED FOR NAUSEA, Disp: , Rfl:     DULoxetine (CYMBALTA) 20 MG extended release capsule, TAKE 1 CAPSULE BY MOUTH TWICE A DAY, Disp: , Rfl:     dicyclomine (BENTYL) 10 MG capsule, Take one q 6 hours as needed for abdominal cramps, Disp: 60 capsule, Rfl: 2    albuterol sulfate HFA (VENTOLIN HFA) 108 (90 Base) MCG/ACT inhaler, Inhale 2 puffs into the lungs 4 times daily as needed for Wheezing, Disp: 1 Inhaler, Rfl: 5    lisinopril (PRINIVIL;ZESTRIL) 20 MG tablet, Take 1 tablet by mouth daily, Disp: 90 tablet, Rfl: 2    simvastatin (ZOCOR) 10 MG tablet, Take 1 tablet by mouth nightly, Disp: 90 tablet, Rfl: 3    vitamin D (ERGOCALCIFEROL) 1.25 MG (47396 UT) CAPS capsule, Take 1 capsule by mouth every 14 days, Disp: 6 capsule, Rfl: 2    denosumab (PROLIA) 60 MG/ML SOSY SC injection, Inject 1 mL into the skin every 6 months, Disp: 1 mL, Rfl: 1    Multiple Vitamins-Minerals (MULTIVITAMIN ADULT) TABS, Take by mouth daily, Disp: , Rfl:     LORazepam (ATIVAN) 0.5 MG tablet, Take 1 mg by mouth 3 times daily. , Disp: , Rfl:     aspirin (ASPIRIN ADULT LOW DOSE) 81 MG EC tablet, , Disp: , Rfl:     HYDROcodone-acetaminophen (NORCO)  MG per tablet, Take 1 tablet by mouth every 8 hours as needed for Pain ., Disp: , Rfl:      Allergies   Allergen Reactions    Epinephrine Other (See Comments)     PATIENT SATES \"HEART PALPATATIONS. \"     Adhesive Tape Rash        Past Medical History:   Diagnosis Date    Abnormal Pap smear of cervix     Chronic obstructive lung disease ([facility] [address] 7/9/2018    Chronic pain     Chronic respiratory failure with hypoxia (HCC) 6/6/2019    Depression     Hyperlipidemia     Hypertension     Osteoarthritis     Osteoporosis     Palpitations     Sleep apnea     doesnt use her cpap       Health Maintenance Due   Topic Date Due    DTaP/Tdap/Td vaccine (1 - Tdap) 10/10/1962        Patient Active Problem List   Diagnosis    Low back pain    Degeneration of lumbar or lumbosacral intervertebral disc    Spinal stenosis    Depression    Chronic pain    Gastroesophageal reflux disease    Hyperlipidemia    Hypertension    Osteoporosis    Sleep apnea    Thyroid nodule    Uterine cancer ([facility] [address]    Chronic respiratory failure with hypoxia (HCC)    Hypoxia, sleep related    Need for tetanus, diphtheria, and acellular pertussis (Tdap) vaccine    Severe episode of recurrent major depressive disorder, without psychotic features (HCC)    Drug-induced constipation    Stage 3 chronic kidney disease    Nausea    Abnormal urine odor    Urinary frequency    Hyperglycemia        Past Surgical History:   Procedure Laterality Date    BACK SURGERY  04/19/2017    T7 spinal cord stimulator with Dr. Augustina Anderson      breast reconstruction w/implants    COLONOSCOPY      COSMETIC SURGERY      jaw surgery    ENDOSCOPY, COLON, DIAGNOSTIC      HYSTERECTOMY  2004    complete    MASTECTOMY  ,     left and right    OTHER SURGICAL HISTORY N/A 2017    stage 1  5 day spinal cord stimulator trial [facility]    SKIN BIOPSY      moles    TONSILLECTOMY          Family History   Problem Relation Age of Onset    No Known Problems Mother     No Known Problems Father         Social History     Tobacco Use    Smoking status: Former Smoker     Packs/day: 1.00     Years: 12.00     Pack years: 12.00     Types: Cigarettes     Start date: 1965     Last attempt to quit: 1974     Years since quittin.4    Smokeless tobacco: Never Used   Substance Use Topics    Alcohol use: No    Drug use: Yes     Comment: Norco, Lorazepam        Objective  Vitals:    10/27/20 1317   BP: 122/74   Pulse: 80   Resp: 18   Temp: 96.1 °F (35.6 °C)   SpO2: 96%   Weight: 123 lb (55.8 kg)   Height: 5' 5\" (1.651 m)        Exam:  Physical Exam  Vitals signs reviewed. Constitutional:       General: She is not in acute distress. Appearance: She is well-developed. She is not ill-appearing. Comments: Thin   HENT:      Head: Normocephalic and atraumatic. Right Ear: External ear normal.      Left Ear: External ear normal.      Mouth/Throat:      Pharynx: No oropharyngeal exudate. Eyes:      General: No scleral icterus. Right eye: No discharge. Left eye: No discharge. Conjunctiva/sclera: Conjunctivae normal.      Pupils: Pupils are equal, round, and reactive to light. Neck:      Musculoskeletal: Normal range of motion and neck supple. No neck rigidity. Thyroid: No thyromegaly. Cardiovascular:      Rate and Rhythm: Normal rate and regular rhythm. Heart sounds: Normal heart sounds. No murmur. No friction rub. No gallop. Pulmonary:      Effort: Pulmonary effort is normal. No respiratory distress. Breath sounds: Normal breath sounds.  No wheezing or rales.   Chest:      Chest wall: No tenderness. Abdominal:      General: Bowel sounds are normal. There is no distension. Palpations: Abdomen is soft. There is no mass. Tenderness: There is no abdominal tenderness. There is no guarding or rebound. Musculoskeletal: Normal range of motion. General: Tenderness present. No deformity. Lymphadenopathy:      Cervical: No cervical adenopathy. Skin:     General: Skin is warm and dry. Coloration: Skin is not pale. Findings: No erythema or rash. Neurological:      Mental Status: She is alert and oriented to person, place, and time. Cranial Nerves: No cranial nerve deficit. Sensory: No sensory deficit. Deep Tendon Reflexes: Reflexes normal.      Comments: Intention tremor   Psychiatric:         Behavior: Behavior normal.         Thought Content: Thought content normal.         Judgment: Judgment normal.      Comments: Mood is sad          Last labs reviewed. ASSESSMENT & PLAN :     Hypertension:  Continue medications, her blood pressures are acceptable    Chronic respiratory failure with hypoxia  Patient refuses nocturnal oxygen and CPAP. She might be willing to try the new CPAP and masks after she sees Dr. Yasmin Goel, sleep related  Given refill on Ventolin HFA which she claims helps her breathe at night. Keep her appointment with sleep specialist Dr. Jluis Palacio in November 2020. Will await his recommendation. Gastroesophageal reflux disease  Continue medications.   She takes dicyclomine on a as needed basis    Thyroid nodule  Benign by biopsy, will monitor  Refer per her request to Dr. Marcy Horne for further evaluation    Stage III chronic kidney disease  Continue to monitor    Chronic pain  Continue Norco per pain management    Nausea  Increase promethazine to 25 mg 3 times a day as needed take about half an hour to an hour after she takes her Norco and lorazepam    Severe episode of recurrent major

## 2020-10-28 ENCOUNTER — TELEPHONE (OUTPATIENT)
Dept: PRIMARY CARE CLINIC | Age: 75
End: 2020-10-28

## 2020-11-12 RX ORDER — PROMETHAZINE HYDROCHLORIDE 25 MG/1
25 TABLET ORAL 2 TIMES DAILY
Qty: 60 TABLET | Refills: 2 | Status: SHIPPED | OUTPATIENT
Start: 2020-11-12 | End: 2020-12-12

## 2020-11-13 ENCOUNTER — TELEPHONE (OUTPATIENT)
Dept: PRIMARY CARE CLINIC | Age: 75
End: 2020-11-13

## 2020-11-13 NOTE — TELEPHONE ENCOUNTER
Pt was advised of this and is very  Upset she has to do this due to the fact she was already approved I tried to explain it to her and she was NOT having it

## 2020-11-13 NOTE — TELEPHONE ENCOUNTER
Call patient. Has she seen sleep specialist Dr. Eben Smith? Erich Castro  [facility] will not get her oxygen unless she undergoes either repeat overnight pulse oximetry or she could come to the office and do a pulse oximetry test with exercise(6 minute walk ).   Must be on room air and then with oxygen

## 2020-12-21 ENCOUNTER — VIRTUAL VISIT (OUTPATIENT)
Dept: PRIMARY CARE CLINIC | Age: 75
End: 2020-12-21
Payer: MEDICARE

## 2020-12-21 PROCEDURE — G0439 PPPS, SUBSEQ VISIT: HCPCS | Performed by: INTERNAL MEDICINE

## 2020-12-21 PROCEDURE — G8484 FLU IMMUNIZE NO ADMIN: HCPCS | Performed by: INTERNAL MEDICINE

## 2020-12-21 PROCEDURE — 4040F PNEUMOC VAC/ADMIN/RCVD: CPT | Performed by: INTERNAL MEDICINE

## 2020-12-21 PROCEDURE — 1123F ACP DISCUSS/DSCN MKR DOCD: CPT | Performed by: INTERNAL MEDICINE

## 2020-12-21 RX ORDER — AMITRIPTYLINE HYDROCHLORIDE 25 MG/1
50 TABLET, FILM COATED ORAL
COMMUNITY
Start: 2020-11-03 | End: 2021-02-08

## 2020-12-21 ASSESSMENT — PATIENT HEALTH QUESTIONNAIRE - PHQ9
SUM OF ALL RESPONSES TO PHQ9 QUESTIONS 1 & 2: 0
SUM OF ALL RESPONSES TO PHQ QUESTIONS 1-9: 0
2. FEELING DOWN, DEPRESSED OR HOPELESS: 0
1. LITTLE INTEREST OR PLEASURE IN DOING THINGS: 0
SUM OF ALL RESPONSES TO PHQ QUESTIONS 1-9: 0
SUM OF ALL RESPONSES TO PHQ QUESTIONS 1-9: 0

## 2020-12-21 ASSESSMENT — LIFESTYLE VARIABLES: HOW OFTEN DO YOU HAVE A DRINK CONTAINING ALCOHOL: 0

## 2020-12-21 NOTE — PROGRESS NOTES
Medicare Annual Wellness Visit  Are Name: Azeem Zepeda Date: 2020   MRN: <[id number]> Sex: Female   Age: 66 y.o. Ethnicity: Non-/Non    : 1945 Race: [employer]	[place] is here for [employer] for behavioral, psychosocial and functional/safety risks, and cognitive dysfunction are all negative except as indicated below. These results, as well as other patient data from the [address] form, are documented in Flowsheets linked to this Encounter. Allergies   Allergen Reactions    Epinephrine Other (See Comments)     PATIENT SATES \"HEART PALPATATIONS. \"     Adhesive Tape Rash       Prior to Visit Medications    Medication Sig Taking? Authorizing Provider   DULoxetine (CYMBALTA) 20 MG extended release capsule TAKE 1 CAPSULE BY MOUTH TWICE A DAY Yes Historical Provider, MD   senna-docusate (Tivis Hey) 8.6-50 MG per tablet Take 2 tablets by mouth daily as needed for Constipation Yes Anna Marie Sung DO   dicyclomine (BENTYL) 10 MG capsule Take one q 6 hours as needed for abdominal cramps Yes Anna Marie Sung DO   albuterol sulfate HFA (VENTOLIN HFA) 108 (90 Base) MCG/ACT inhaler Inhale 2 puffs into the lungs 4 times daily as needed for Wheezing Yes Anna Marie Sung DO   lisinopril (PRINIVIL;ZESTRIL) 20 MG tablet Take 1 tablet by mouth daily Yes Anna Marie Sung DO   simvastatin (ZOCOR) 10 MG tablet Take 1 tablet by mouth nightly Yes Anna Marie Sung DO   vitamin D (ERGOCALCIFEROL) 1.25 MG (59883 UT) CAPS capsule Take 1 capsule by mouth every 14 days Yes Anna Marie Sung DO   denosumab (PROLIA) 60 MG/ML SOSY SC injection Inject 1 mL into the skin every 6 months Yes Anna Marie Sung DO   Multiple Vitamins-Minerals (MULTIVITAMIN ADULT) TABS Take by mouth daily Yes Historical Provider, MD   LORazepam (ATIVAN) 0.5 MG tablet Take 1 mg by mouth 3 times daily.   Yes Historical Provider, MD · Inadequate physical activity:  patient is not ready to increase his/her physical activity level at this time   · She has chronic back pain and is unable to exercise. Personalized Preventive Plan   Current Health Maintenance Status  Immunization History   Administered Date(s) Administered    Influenza A ([id number]) Vaccine PF IM 12/17/2009    Influenza Vaccine, unspecified formulation 10/10/2007, 11/02/2008, 10/09/2017    Influenza Virus Vaccine 10/10/2007, 11/02/2008, 10/09/2017    Influenza, High Dose (Fluzone 65 yrs and older) 10/09/2017, 11/20/2018, 09/26/2019    Influenza, Quadv, adjuvanted, 65 yrs +, IM, PF (Fluad) 10/01/2020    Pneumococcal Conjugate 13-valent (Vrimlnm89) 05/28/2015, 05/19/2018    Pneumococcal Polysaccharide ([id number]) 08/08/2018    Td vaccine (adult) 06/11/2003    Td, unspecified formulation 06/11/2003    Zoster Recombinant (Shingrix) 10/23/2019, 12/23/2019        Health Maintenance   Topic Date Due    DTaP/Tdap/Td vaccine (1 - Tdap) 10/10/1962    Annual Wellness Visit (AWV)  05/29/2019    Lipid screen  05/21/2021    Potassium monitoring  10/26/2021    Creatinine monitoring  10/26/2021    DEXA (modify frequency per FRAX score)  Completed    Flu vaccine  Completed    Shingles Vaccine  Completed    Pneumococcal 65+ years Vaccine  Completed    Hepatitis A vaccine  Aged Out    Hepatitis B vaccine  Aged Out    Hib vaccine  Aged Out    Meningococcal (ACWY) vaccine  Aged Out     Recommendations for [employer] Due: see orders and patient instructions/AVS.  . Recommended screening schedule for the next 5-10 years is provided to the patient in written form: see Patient Instructions/AVS.    There are no diagnoses linked to this encounter. Dennys Leon is a 66 y.o. female being evaluated by a Virtual Visit (phone) encounter to address concerns as mentioned above. A caregiver was present when appropriate. Due to this being a TeleHealth encounter (During EGQPD-54 public health emergency), evaluation of the following organ systems was limited: Vitals/Constitutional/EENT/Resp/CV/GI//MS/Neuro/Skin/Heme-Lymph-Imm. Pursuant to the emergency declaration under the [address] [place] [address] and the [employer] Act, this Virtual Visit was conducted with patient's (and/or legal guardian's) consent, to reduce the patient's risk of exposure to COVID-19 and provide necessary medical care. The patient (and/or legal guardian) has also been advised to contact this office for worsening conditions or problems, and seek emergency medical treatment and/or call 911 if deemed necessary. Patient identification was verified at the start of the visit: Yes    Services were provided through phone to substitute for in-person clinic visit. Patient and provider were located at their individual homes. --Ale Snowden DO on 12/21/2020 at 12:00 PM    An electronic signature was used to authenticate this note. Cardiovascular Disease Risk Counseling: Assessed the patient's risk to develop cardiovascular disease and reviewed main risk factors.    Reviewed steps to reduce disease risk including:   · Quitting tobacco use, reducing amount smoked, or not starting the habit  · Making healthy food choices  · Being physically active and gradualy increasing activity levels   · Reduce weight and determine a healthy BMI goal  · Monitor blood pressure and treat if higher than 140/90 mmHg  · Maintain blood total cholesterol levels under 5 mmol/l or 190 mg/dl  · Maintain LDL cholesterol levels under 3.0 mmol/l or 115 mg/dl   · Control blood glucose levels · Consider taking aspirin (75 mg daily), once blood pressure is controlled   Provided a follow up plan.   Time spent (minutes): 6

## 2020-12-21 NOTE — PATIENT INSTRUCTIONS
Personalized Preventive Plan for Mciah Stock - 12/21/2020  Medicare offers a range of preventive health benefits. Some of the tests and screenings are paid in full while other may be subject to a deductible, co-insurance, and/or copay. Some of these benefits include a comprehensive review of your medical history including lifestyle, illnesses that may run in your family, and various assessments and screenings as appropriate. After reviewing your medical record and screening and assessments performed today your provider may have ordered immunizations, labs, imaging, and/or referrals for you. A list of these orders (if applicable) as well as your Preventive Care list are included within your After Visit Summary for your review. Other Preventive Recommendations:    · A preventive eye exam performed by an eye specialist is recommended every 1-2 years to screen for glaucoma; cataracts, macular degeneration, and other eye disorders. · A preventive dental visit is recommended every 6 months. · Try to get at least 150 minutes of exercise per week or 10,000 steps per day on a pedometer . · Order or download the FREE \"Exercise & Physical Activity: Your Everyday Guide\" from [employer]. Call 0-198.133.6473 or search [employer] online. · You need 1068-8832 mg of calcium and 6110-7519 IU of vitamin D per day. It is possible to meet your calcium requirement with diet alone, but a vitamin D supplement is usually necessary to meet this goal.  · When exposed to the sun, use a sunscreen that protects against both UVA and UVB radiation with an SPF of 30 or greater. Reapply every 2 to 3 hours or after sweating, drying off with a towel, or swimming. · Always wear a seat belt when traveling in a car. Always wear a helmet when riding a bicycle or motorcycle.

## 2021-02-08 ENCOUNTER — OFFICE VISIT (OUTPATIENT)
Dept: PRIMARY CARE CLINIC | Age: 76
End: 2021-02-08
Payer: MEDICARE

## 2021-02-08 VITALS
WEIGHT: 132 LBS | DIASTOLIC BLOOD PRESSURE: 62 MMHG | OXYGEN SATURATION: 96 % | HEIGHT: 65 IN | BODY MASS INDEX: 21.99 KG/M2 | HEART RATE: 88 BPM | TEMPERATURE: 98.3 F | SYSTOLIC BLOOD PRESSURE: 110 MMHG

## 2021-02-08 DIAGNOSIS — C50.919 MALIGNANT NEOPLASM OF FEMALE BREAST, UNSPECIFIED ESTROGEN RECEPTOR STATUS, UNSPECIFIED LATERALITY, UNSPECIFIED SITE OF BREAST (HCC): ICD-10-CM

## 2021-02-08 DIAGNOSIS — R73.9 HYPERGLYCEMIA: ICD-10-CM

## 2021-02-08 DIAGNOSIS — F33.2 SEVERE EPISODE OF RECURRENT MAJOR DEPRESSIVE DISORDER, WITHOUT PSYCHOTIC FEATURES (HCC): ICD-10-CM

## 2021-02-08 DIAGNOSIS — K59.03 DRUG-INDUCED CONSTIPATION: ICD-10-CM

## 2021-02-08 DIAGNOSIS — F99 INSOMNIA DUE TO OTHER MENTAL DISORDER: ICD-10-CM

## 2021-02-08 DIAGNOSIS — I10 ESSENTIAL HYPERTENSION: ICD-10-CM

## 2021-02-08 DIAGNOSIS — G89.4 CHRONIC PAIN SYNDROME: ICD-10-CM

## 2021-02-08 DIAGNOSIS — I10 ESSENTIAL HYPERTENSION: Primary | ICD-10-CM

## 2021-02-08 DIAGNOSIS — M81.6 LOCALIZED OSTEOPOROSIS WITHOUT CURRENT PATHOLOGICAL FRACTURE: ICD-10-CM

## 2021-02-08 DIAGNOSIS — F51.05 INSOMNIA DUE TO OTHER MENTAL DISORDER: ICD-10-CM

## 2021-02-08 DIAGNOSIS — E04.1 THYROID NODULE: ICD-10-CM

## 2021-02-08 DIAGNOSIS — E78.2 MIXED HYPERLIPIDEMIA: ICD-10-CM

## 2021-02-08 DIAGNOSIS — R11.0 NAUSEA: ICD-10-CM

## 2021-02-08 PROBLEM — J96.11 CHRONIC RESPIRATORY FAILURE WITH HYPOXIA (HCC): Status: RESOLVED | Noted: 2019-06-06 | Resolved: 2021-02-08

## 2021-02-08 PROBLEM — N18.30 STAGE 3 CHRONIC KIDNEY DISEASE (HCC): Status: RESOLVED | Noted: 2019-10-23 | Resolved: 2021-02-08

## 2021-02-08 LAB
ALBUMIN SERPL-MCNC: 4.6 G/DL (ref 3.5–5.2)
ALP BLD-CCNC: 32 U/L (ref 35–104)
ALT SERPL-CCNC: 15 U/L (ref 0–32)
ANION GAP SERPL CALCULATED.3IONS-SCNC: 14 MMOL/L (ref 7–16)
AST SERPL-CCNC: 20 U/L (ref 0–31)
BASOPHILS ABSOLUTE: 0.07 E9/L (ref 0–0.2)
BASOPHILS RELATIVE PERCENT: 0.7 % (ref 0–2)
BILIRUB SERPL-MCNC: 0.2 MG/DL (ref 0–1.2)
BUN BLDV-MCNC: 29 MG/DL (ref 8–23)
CALCIUM SERPL-MCNC: 9.5 MG/DL (ref 8.6–10.2)
CHLORIDE BLD-SCNC: 100 MMOL/L (ref 98–107)
CO2: 19 MMOL/L (ref 22–29)
CREAT SERPL-MCNC: 1 MG/DL (ref 0.5–1)
EOSINOPHILS ABSOLUTE: 0.16 E9/L (ref 0.05–0.5)
EOSINOPHILS RELATIVE PERCENT: 1.7 % (ref 0–6)
GFR AFRICAN AMERICAN: >60
GFR NON-AFRICAN AMERICAN: 54 ML/MIN/1.73
GLUCOSE BLD-MCNC: 118 MG/DL (ref 74–99)
HBA1C MFR BLD: 5.6 % (ref 4–5.6)
HCT VFR BLD CALC: 35.2 % (ref 34–48)
HEMOGLOBIN: 11.8 G/DL (ref 11.5–15.5)
IMMATURE GRANULOCYTES #: 0.07 E9/L
IMMATURE GRANULOCYTES %: 0.7 % (ref 0–5)
LYMPHOCYTES ABSOLUTE: 3.32 E9/L (ref 1.5–4)
LYMPHOCYTES RELATIVE PERCENT: 35 % (ref 20–42)
MCH RBC QN AUTO: 31.3 PG (ref 26–35)
MCHC RBC AUTO-ENTMCNC: 33.5 % (ref 32–34.5)
MCV RBC AUTO: 93.4 FL (ref 80–99.9)
MONOCYTES ABSOLUTE: 0.81 E9/L (ref 0.1–0.95)
MONOCYTES RELATIVE PERCENT: 8.5 % (ref 2–12)
NEUTROPHILS ABSOLUTE: 5.05 E9/L (ref 1.8–7.3)
NEUTROPHILS RELATIVE PERCENT: 53.4 % (ref 43–80)
PDW BLD-RTO: 14.6 FL (ref 11.5–15)
PLATELET # BLD: 464 E9/L (ref 130–450)
PMV BLD AUTO: 8.7 FL (ref 7–12)
POTASSIUM SERPL-SCNC: 4.4 MMOL/L (ref 3.5–5)
RBC # BLD: 3.77 E12/L (ref 3.5–5.5)
SODIUM BLD-SCNC: 133 MMOL/L (ref 132–146)
TOTAL PROTEIN: 7.4 G/DL (ref 6.4–8.3)
WBC # BLD: 9.5 E9/L (ref 4.5–11.5)

## 2021-02-08 PROCEDURE — G8399 PT W/DXA RESULTS DOCUMENT: HCPCS | Performed by: INTERNAL MEDICINE

## 2021-02-08 PROCEDURE — G8484 FLU IMMUNIZE NO ADMIN: HCPCS | Performed by: INTERNAL MEDICINE

## 2021-02-08 PROCEDURE — 1036F TOBACCO NON-USER: CPT | Performed by: INTERNAL MEDICINE

## 2021-02-08 PROCEDURE — 4040F PNEUMOC VAC/ADMIN/RCVD: CPT | Performed by: INTERNAL MEDICINE

## 2021-02-08 PROCEDURE — 1090F PRES/ABSN URINE INCON ASSESS: CPT | Performed by: INTERNAL MEDICINE

## 2021-02-08 PROCEDURE — 1123F ACP DISCUSS/DSCN MKR DOCD: CPT | Performed by: INTERNAL MEDICINE

## 2021-02-08 PROCEDURE — G8427 DOCREV CUR MEDS BY ELIG CLIN: HCPCS | Performed by: INTERNAL MEDICINE

## 2021-02-08 PROCEDURE — G8420 CALC BMI NORM PARAMETERS: HCPCS | Performed by: INTERNAL MEDICINE

## 2021-02-08 PROCEDURE — 99214 OFFICE O/P EST MOD 30 MIN: CPT | Performed by: INTERNAL MEDICINE

## 2021-02-08 RX ORDER — PROMETHAZINE HYDROCHLORIDE 25 MG/1
25 TABLET ORAL 2 TIMES DAILY
COMMUNITY
End: 2021-02-08 | Stop reason: SDUPTHER

## 2021-02-08 RX ORDER — MEGESTROL ACETATE 40 MG/1
TABLET ORAL
COMMUNITY
Start: 2021-01-03 | End: 2021-08-26 | Stop reason: ALTCHOICE

## 2021-02-08 RX ORDER — AMITRIPTYLINE HYDROCHLORIDE 50 MG/1
50 TABLET, FILM COATED ORAL NIGHTLY
Qty: 90 TABLET | Refills: 1 | Status: SHIPPED | OUTPATIENT
Start: 2021-02-08 | End: 2021-05-25 | Stop reason: SDUPTHER

## 2021-02-08 RX ORDER — LISINOPRIL 20 MG/1
20 TABLET ORAL DAILY
Qty: 90 TABLET | Refills: 2 | Status: SHIPPED | OUTPATIENT
Start: 2021-02-08 | End: 2021-08-26 | Stop reason: SDUPTHER

## 2021-02-08 RX ORDER — LORAZEPAM 1 MG/1
TABLET ORAL
COMMUNITY
Start: 2021-02-01

## 2021-02-08 RX ORDER — PROMETHAZINE HYDROCHLORIDE 25 MG/1
25 TABLET ORAL 2 TIMES DAILY
Qty: 180 TABLET | Refills: 2 | Status: SHIPPED | OUTPATIENT
Start: 2021-02-08 | End: 2021-05-09

## 2021-02-08 RX ORDER — AMITRIPTYLINE HYDROCHLORIDE 50 MG/1
50 TABLET, FILM COATED ORAL NIGHTLY
Qty: 90 TABLET | Refills: 1 | Status: SHIPPED
Start: 2021-02-08 | End: 2021-02-08

## 2021-02-08 RX ORDER — SERTRALINE HYDROCHLORIDE 25 MG/1
25 TABLET, FILM COATED ORAL DAILY
COMMUNITY
End: 2021-05-25

## 2021-02-08 RX ORDER — MIRTAZAPINE 30 MG/1
TABLET, FILM COATED ORAL
COMMUNITY
Start: 2021-02-01 | End: 2022-05-26

## 2021-02-08 RX ORDER — SIMVASTATIN 10 MG
10 TABLET ORAL NIGHTLY
Qty: 90 TABLET | Refills: 2 | Status: SHIPPED | OUTPATIENT
Start: 2021-02-08 | End: 2021-08-26 | Stop reason: SDUPTHER

## 2021-02-08 ASSESSMENT — ENCOUNTER SYMPTOMS
DIARRHEA: 0
EYE PAIN: 0
WHEEZING: 0
ABDOMINAL PAIN: 0
BLOOD IN STOOL: 0
VOMITING: 0
BACK PAIN: 1
ANAL BLEEDING: 0
SHORTNESS OF BREATH: 0
CONSTIPATION: 1
NAUSEA: 0
ROS SKIN COMMENTS: DRY
RHINORRHEA: 0
TROUBLE SWALLOWING: 0
EYE DISCHARGE: 0
FACIAL SWELLING: 0
STRIDOR: 0
COUGH: 0
PHOTOPHOBIA: 0
EYE ITCHING: 0
SORE THROAT: 0
COLOR CHANGE: 0

## 2021-02-08 NOTE — LETTER
[facility]  [address]  Phone: [phone]  Fax: [address],         February 8, 2021        Dear Dr Ava Jin is a patient of mine. She has chronic back pain secondary to spinal stenosis and osteoarthritis. She has been under pain management care for quite a while. They have her on Norco 10/325 3 times a day along with lorazepam 1 mg 3 times a day. They try to wean her off some of this but her pain got much worse. Unfortunately her life is ruled by her opioid-induced constipation. She has been to GI. She is tried just about everything for the constipation. She now takes the as needed saline enema. She does not want to be on all these medications for constipation she would like to be weaned off her opioid and  placed on medical marijuana for her chronic pain  She also has recurrent nausea and weight loss most probably from her chronic opioid therapy. She is on Remeron, Megace and promethazine. If the symptoms can be controlled by medical marijuana then we can get her off these medications. Other problems include osteoporosis, major depressive disorder under the care of a psychiatrist Dr. Crystal Moore in [place]. Hypertension, chronic insomnia on amitriptyline per sleep specialist.  If you wish I can send you a copy of her last office visit note about her chronic medical problems. Thank you Titus Rivas for seeing Mrs. Taylor,    Sincerely,    [employer]o [employer], DO    Sincerely,        Yael Martinez, DO

## 2021-02-08 NOTE — PROGRESS NOTES
2021    Name: Jinx Claude : 1945 Sex: female  Age: 66 y.o. Subjective:  Chief Complaint   Patient presents with    Hypertension        Hypertension  Pertinent negatives include no chest pain, headaches, palpitations or shortness of breath. She saw Dr. Harp [facility] who felt she did not need a CPAP or nocturnal oxygen. He started her on lower dose amitriptyline 25 mg which was then increased to 50 mg. Patient states she feels so much better on this, she gets a full night sleep and has no side effects of dry mouth dizziness. She requested I write her a 90-day supply of this as she has been unable to get a hold of her sleep specialist.  He also recommended that she see an addiction specialist however the one that he referred her to, Dr. Viktor Mcmillan ,has since retired. She had unintentional weight loss. She is lost about 35  pounds since 2020. The weight loss has stabilized since the her oncologist started her on Megace 40 mg a day and mirtazapine 30 mg at bedtime. Her weight went up from 123 pounds to 132 pounds today. .  She  has dysphagia which apparently was worse after GI stretched her esophagus. She went to see Dr. Iglesia Ramires. Reviewed his report. He did not feel she needed a repeat colonoscopy. He recommended continuing with a single small volume saline enema for relief of her chronic narcotic induced constipation. She also takes Metamucil and MiraLAX daily. And continues on Dulcolax suppository as needed. Despite this she still has chronic constipation. She was tried on Linzess but this was too expensive. She does not remember trying Amitiza although this was noted on a GI consult this year    Her  says her chronic constipation is the most bothersome thing in her life. 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 She wants to get off the Vicodin. She wants to try medical marijuana for pain relief.   I do not know she is a candidate for this but will send her to Dr. Coleen Urrutia and have him evaluate her and see if she is a good candidate for switching over. She also has chronic nausea because of her GI complaints and this would be helped as well with medical marijuana. With marijuana's affect on her appetite we may be able to get her off both Megace and Remeron. She had a ultrasound of her thyroid to monitor her multinodular goiter. 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 Previous free T4 and TSH have been normal.  We did a thyroid nuclear uptake and scan which was normal.  Per her request she was referred to endocrinologist for further evaluation. She complains of increased irritability, dry skin, thin nails and chronic fatigue. All symptoms of possible underlying hypothyroidism. Recheck TSH and free T4 were still normal.  Thyroid studies including ultrasound, scan and uptake were all normal..      She continues to see pain management who has her on opioids. If she does not take the opioid she is unable to function because of chronic back pain. Hopefully switching her to medical marijuana will help the pain and alleviate her chronic constipation. She says the only time she feels free of pain is when she takes her hydrocodone and her lorazepam 3 times a day. The Phenergan does help her nausea but not as good as she would like it a plan to increase it from 12.5 to 25 mg a day and have her take it about half an hour to an hour after she takes her Norco and her lorazepam.  .    She has a history of osteoporosis on Prolia every 6 months. Recent DEXA scan showed osteopenia. Her next Prolia shot is due after 5/20/2021. She said she had a Prolia shot here in November 2020. She sees Dr. Naila Moreira for follow-up on her breast cancer. She had a repeat CT scan of her chest  as she had some abnormalities on the last one.   The repeat CAT scan showed resolution of the pulmonary abnormalities and her oncologist is happy with the results. She had a history of CKD stage III. We  Checked  for secondary hyperparathyroidism. Calcium was normal at 9.5 and intact PTH was normal at 30. Unlikely to have hyperparathyroidism  We will recheck her kidney functions and she is now stage II with estimated GFR of over 60    She complains of urinary frequency but cannot take medication because it worsens constipation    Review of Systems   Constitutional: Positive for appetite change. Negative for fatigue and unexpected weight change. HENT: Negative for congestion, ear pain, facial swelling, rhinorrhea, sore throat, tinnitus and trouble swallowing. Thinning hais   Eyes: Negative for photophobia, pain, discharge, itching and visual disturbance. Respiratory: Negative for cough, shortness of breath, wheezing and stridor. Cardiovascular: Negative for chest pain, palpitations and leg swelling. Gastrointestinal: Positive for constipation. Negative for abdominal pain, anal bleeding, blood in stool, diarrhea, nausea and vomiting. Dysphagia   Endocrine: Negative for cold intolerance, heat intolerance, polydipsia, polyphagia and polyuria. Genitourinary: Positive for frequency. Negative for difficulty urinating, dysuria, flank pain, hematuria and urgency. Musculoskeletal: Positive for arthralgias and back pain. Negative for gait problem, joint swelling and myalgias. Skin: Negative for color change, pallor and rash. dry   Allergic/Immunologic: Negative for environmental allergies and food allergies. Neurological: Positive for tremors. Negative for dizziness, seizures, syncope, speech difficulty, light-headedness, numbness and headaches. Hematological: Negative for adenopathy. Does not bruise/bleed easily. Psychiatric/Behavioral: Positive for confusion. Negative for agitation, behavioral problems, sleep disturbance and suicidal ideas.  The patient is nervous/anxious. Irritable            Current Outpatient Medications:     lisinopril (PRINIVIL;ZESTRIL) 20 MG tablet, Take 1 tablet by mouth daily, Disp: 90 tablet, Rfl: 2    promethazine (PHENERGAN) 25 MG tablet, Take 1 tablet by mouth 2 times daily, Disp: 180 tablet, Rfl: 2    simvastatin (ZOCOR) 10 MG tablet, Take 1 tablet by mouth nightly, Disp: 90 tablet, Rfl: 2    sertraline (ZOLOFT) 25 MG tablet, Take 25 mg by mouth daily, Disp: , Rfl:     amitriptyline (ELAVIL) 50 MG tablet, Take 1 tablet by mouth nightly, Disp: 90 tablet, Rfl: 1    senna-docusate (PERICOLACE) 8.6-50 MG per tablet, Take 2 tablets by mouth daily as needed for Constipation, Disp: 60 tablet, Rfl: 5    dicyclomine (BENTYL) 10 MG capsule, Take one q 6 hours as needed for abdominal cramps, Disp: 60 capsule, Rfl: 2    albuterol sulfate HFA (VENTOLIN HFA) 108 (90 Base) MCG/ACT inhaler, Inhale 2 puffs into the lungs 4 times daily as needed for Wheezing, Disp: 1 Inhaler, Rfl: 5    vitamin D (ERGOCALCIFEROL) 1.25 MG (64350 UT) CAPS capsule, Take 1 capsule by mouth every 14 days, Disp: 6 capsule, Rfl: 2    denosumab (PROLIA) 60 MG/ML SOSY SC injection, Inject 1 mL into the skin every 6 months, Disp: 1 mL, Rfl: 1    Multiple Vitamins-Minerals (MULTIVITAMIN ADULT) TABS, Take by mouth daily, Disp: , Rfl:     LORazepam (ATIVAN) 0.5 MG tablet, Take 1 mg by mouth 3 times daily. , Disp: , Rfl:     aspirin (ASPIRIN ADULT LOW DOSE) 81 MG EC tablet, , Disp: , Rfl:     HYDROcodone-acetaminophen (NORCO)  MG per tablet, Take 1 tablet by mouth every 8 hours as needed for Pain ., Disp: , Rfl:      Allergies   Allergen Reactions    Epinephrine Other (See Comments)     PATIENT SATES \"HEART PALPATATIONS. \"     Adhesive Tape Rash        Past Medical History:   Diagnosis Date    Abnormal Pap smear of cervix     [facility]    Chronic pain     Chronic respiratory failure with hypoxia (HCC) Sensory: No sensory deficit. Deep Tendon Reflexes: Reflexes normal.      Comments: Intention tremor   Psychiatric:         Behavior: Behavior normal.         Thought Content: Thought content normal.         Judgment: Judgment normal.      Comments: Mood is sad          Last labs reviewed. ASSESSMENT & PLAN :     Hypertension:  Continue medications, her blood pressures are acceptable    Chronic respiratory failure with hypoxia  Not present per sleep specialist.  She does not need CPAP or nocturnal oxygen. Hypoxia, sleep related  No further problems. She is able to get good REM sleep and sleep specialist does not think she needs nocturnal oxygen    Gastroesophageal reflux disease  Continue medications. She takes dicyclomine on a as needed basis    Thyroid nodule  Benign by biopsy, will monitor  Was told by endocrinologist that her thyroid was okay. We will continue to monitor    Stage III chronic kidney disease  Resolved, no longer stage III now with stage II    Chronic pain  Continue Norco per pain management. For now. refer to Dr. Abdifatah Alcaraz ,hopefully he can take her as a patient and switch her over to medical marijuana for chronic pain. Then she can be off her opioids and her chronic constipation will improve. 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 She will be seeing a new psychiatrist in [place]. Check CBC and CMP    Spinal stenosis  Take her Norco as directed. Hyperglycemia  Check hemoglobin A1c with her next blood work    Chronic pain syndrome  Refer to Dr. Abdifatah Alcaraz. Letter will be written about the concerns and whether she can be helped with medical marijuana instead of taking chronic opioid therapy.     History of osteoporosis  Prolia injection as per recommendation of her oncologist after 5/20/2021.    to call in about a week or 2 before that visit and get a prescription for Prolia 60 mg subcu x1 dose    Follow-up:  I will see her in about 3 months for office visit and Prolia injection      Electronically signed    Jayden Carney DO

## 2021-02-08 NOTE — PATIENT INSTRUCTIONS
Call Dr Katerina Cosme regarding Marijuana therapy fpr pain  Get Prolia prescription about 1-2 weeks before your next visit in May

## 2021-05-25 ENCOUNTER — OFFICE VISIT (OUTPATIENT)
Dept: PRIMARY CARE CLINIC | Age: 76
End: 2021-05-25
Payer: MEDICARE

## 2021-05-25 VITALS
OXYGEN SATURATION: 97 % | HEART RATE: 89 BPM | TEMPERATURE: 98.1 F | HEIGHT: 65 IN | WEIGHT: 130 LBS | SYSTOLIC BLOOD PRESSURE: 110 MMHG | DIASTOLIC BLOOD PRESSURE: 60 MMHG | BODY MASS INDEX: 21.66 KG/M2

## 2021-05-25 DIAGNOSIS — E04.1 THYROID NODULE: ICD-10-CM

## 2021-05-25 DIAGNOSIS — Z12.31 ENCOUNTER FOR SCREENING MAMMOGRAM FOR MALIGNANT NEOPLASM OF BREAST: ICD-10-CM

## 2021-05-25 DIAGNOSIS — M81.0 AGE-RELATED OSTEOPOROSIS WITHOUT CURRENT PATHOLOGICAL FRACTURE: ICD-10-CM

## 2021-05-25 DIAGNOSIS — F51.05 INSOMNIA DUE TO OTHER MENTAL DISORDER: ICD-10-CM

## 2021-05-25 DIAGNOSIS — K21.9 GASTROESOPHAGEAL REFLUX DISEASE, UNSPECIFIED WHETHER ESOPHAGITIS PRESENT: ICD-10-CM

## 2021-05-25 DIAGNOSIS — K59.03 DRUG-INDUCED CONSTIPATION: ICD-10-CM

## 2021-05-25 DIAGNOSIS — R10.30 LOWER ABDOMINAL PAIN: ICD-10-CM

## 2021-05-25 DIAGNOSIS — I10 ESSENTIAL HYPERTENSION: Primary | ICD-10-CM

## 2021-05-25 DIAGNOSIS — E78.2 MIXED HYPERLIPIDEMIA: ICD-10-CM

## 2021-05-25 DIAGNOSIS — G47.34 HYPOXIA, SLEEP RELATED: ICD-10-CM

## 2021-05-25 DIAGNOSIS — M51.37 DEGENERATION OF LUMBAR OR LUMBOSACRAL INTERVERTEBRAL DISC: Chronic | ICD-10-CM

## 2021-05-25 DIAGNOSIS — F99 INSOMNIA DUE TO OTHER MENTAL DISORDER: ICD-10-CM

## 2021-05-25 DIAGNOSIS — R73.9 HYPERGLYCEMIA: ICD-10-CM

## 2021-05-25 PROBLEM — Z80.3 FAMILY HISTORY OF BREAST CANCER: Status: ACTIVE | Noted: 2021-03-01

## 2021-05-25 PROBLEM — Z80.0 FAMILY HISTORY OF COLON CANCER: Status: ACTIVE | Noted: 2021-03-01

## 2021-05-25 PROBLEM — Z85.42 HISTORY OF UTERINE CANCER: Status: ACTIVE | Noted: 2021-03-01

## 2021-05-25 PROBLEM — C55 UTERINE CANCER (HCC): Status: RESOLVED | Noted: 2019-05-19 | Resolved: 2021-05-25

## 2021-05-25 PROCEDURE — G8420 CALC BMI NORM PARAMETERS: HCPCS | Performed by: INTERNAL MEDICINE

## 2021-05-25 PROCEDURE — 99214 OFFICE O/P EST MOD 30 MIN: CPT | Performed by: INTERNAL MEDICINE

## 2021-05-25 PROCEDURE — 1036F TOBACCO NON-USER: CPT | Performed by: INTERNAL MEDICINE

## 2021-05-25 PROCEDURE — 1090F PRES/ABSN URINE INCON ASSESS: CPT | Performed by: INTERNAL MEDICINE

## 2021-05-25 PROCEDURE — G8399 PT W/DXA RESULTS DOCUMENT: HCPCS | Performed by: INTERNAL MEDICINE

## 2021-05-25 PROCEDURE — G8427 DOCREV CUR MEDS BY ELIG CLIN: HCPCS | Performed by: INTERNAL MEDICINE

## 2021-05-25 PROCEDURE — 4040F PNEUMOC VAC/ADMIN/RCVD: CPT | Performed by: INTERNAL MEDICINE

## 2021-05-25 PROCEDURE — 1123F ACP DISCUSS/DSCN MKR DOCD: CPT | Performed by: INTERNAL MEDICINE

## 2021-05-25 RX ORDER — AMITRIPTYLINE HYDROCHLORIDE 50 MG/1
50 TABLET, FILM COATED ORAL NIGHTLY
Qty: 90 TABLET | Refills: 1 | Status: SHIPPED | OUTPATIENT
Start: 2021-05-25 | End: 2021-08-26 | Stop reason: SDUPTHER

## 2021-05-25 SDOH — ECONOMIC STABILITY: FOOD INSECURITY: WITHIN THE PAST 12 MONTHS, YOU WORRIED THAT YOUR FOOD WOULD RUN OUT BEFORE YOU GOT MONEY TO BUY MORE.: NEVER TRUE

## 2021-05-25 ASSESSMENT — ENCOUNTER SYMPTOMS
PHOTOPHOBIA: 0
EYE ITCHING: 0
WHEEZING: 0
BACK PAIN: 1
NAUSEA: 0
FACIAL SWELLING: 0
SHORTNESS OF BREATH: 0
RHINORRHEA: 0
TROUBLE SWALLOWING: 0
STRIDOR: 0
COLOR CHANGE: 0
DIARRHEA: 1
VOMITING: 0
COUGH: 0
BLOOD IN STOOL: 0
SORE THROAT: 0
ROS SKIN COMMENTS: DRY
CONSTIPATION: 1
ANAL BLEEDING: 0
ABDOMINAL PAIN: 1
EYE DISCHARGE: 0
EYE PAIN: 0

## 2021-05-25 ASSESSMENT — SOCIAL DETERMINANTS OF HEALTH (SDOH): HOW HARD IS IT FOR YOU TO PAY FOR THE VERY BASICS LIKE FOOD, HOUSING, MEDICAL CARE, AND HEATING?: NOT HARD AT ALL

## 2021-05-25 NOTE — PROGRESS NOTES
2021    Name: Yaneth Search : 1945 Sex: female  Age: 66 y.o. Subjective:  Chief Complaint   Patient presents with    Hypertension        Hypertension  Pertinent negatives include no chest pain, headaches, palpitations or shortness of breath. She saw Dr. Simmie Severs who felt she did not need a CPAP or nocturnal oxygen. He started her on lower dose amitriptyline 25 mg which was then increased to 50 mg. Patient states she feels so much better on this, she gets a full night sleep and has no side effects of dry mouth dizziness. She requested I write her a 90-day supply of this as she has been unable to get a hold of her sleep specialist.  He also recommended that she see an addiction specialist however the one that he referred her to, Dr. Cosmo Lion ,has since retired. She had unintentional weight loss. She is lost about 35  pounds since 2020. The weight loss has stabilized since the her oncologist started her on Megace 40 mg a day and mirtazapine 30 mg at bedtime. Her weight went up from 123 pounds to 132 pounds . Today's weight is down to 130     . She  has dysphagia which apparently was worse after GI stretched her esophagus. She went to see Dr. River Espinoza. Reviewed his report. He did not feel she needed a repeat colonoscopy. He recommended continuing with a single small volume saline enema for relief of her chronic narcotic induced constipation. She also takes Metamucil and MiraLAX daily. And continues on Dulcolax suppository as needed. Despite this she still has chronic constipation. She was tried on Linzess but this was too expensive. She does not remember trying Amitiza although this was noted on a GI consult this year    Her  says her chronic constipation is the most bothersome thing in her life. 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 She wants to get off the Vicodin. She wants to try medical marijuana for pain relief.   I do not know she is a candidate for this but will send her to Dr. Armin Lopez and have him evaluate her and see if she is a good candidate for switching over. She also has chronic nausea because of her GI complaints and this would be helped as well with medical marijuana. With marijuana's affect on her appetite we may be able to get her off both Megace and Remeron. She had a ultrasound of her thyroid to monitor her multinodular goiter. 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 Previous free T4 and TSH have been normal.  We did a thyroid nuclear uptake and scan which was normal.  Per her request she was referred to endocrinologist for further evaluation. She complains of increased irritability, dry skin, thin nails and chronic fatigue. All symptoms of possible underlying hypothyroidism. Recheck TSH and free T4 were still normal.  Thyroid studies including ultrasound, scan and uptake were all normal..      She continues to see pain management who has her on opioids. If she does not take the opioid she is unable to function because of chronic back pain. Hopefully switching her to medical marijuana will help the pain and alleviate her chronic constipation  She had a CT scan of her lumbar spine which showed degenerative disc disease multiple levels, facet arthropathy, spinal stenosis, disc bulging with impingement on neural foramina but nothing that is surgically fixable. She says the only time she feels free of pain is when she takes her hydrocodone and her lorazepam 3 times a day. The Phenergan does help her nausea but not as good as she would like it a plan to increase it from 12.5 to 25 mg a day and have her take it about half an hour to an hour after she takes her Norco and her lorazepam.  .    She has a history of osteoporosis on Prolia every 6 months. Recent DEXA scan showed osteopenia.   Her next Prolia shot is due after 5/20/2021. Denis Bruno She sees Dr. Javon Roth for follow-up on her breast cancer. She had a repeat CT scan of her chest  as she had some abnormalities on the last one. The repeat CAT scan showed resolution of the pulmonary abnormalities and her oncologist is happy with the results. She had a history of CKD stage III. We  Checked  for secondary hyperparathyroidism. Calcium was normal at 9.5 and intact PTH was normal at 30. Unlikely to have hyperparathyroidism  We  rechecked her kidney functions and she is now stage II with estimated GFR of over 60    She complains of urinary frequency but cannot take medication because it worsens constipation    Review of Systems   Constitutional: Positive for appetite change. Negative for unexpected weight change. HENT: Negative for congestion, ear pain, facial swelling, rhinorrhea, sore throat, tinnitus and trouble swallowing. Thinning hais   Eyes: Negative for photophobia, pain, discharge, itching and visual disturbance. Respiratory: Negative for cough, shortness of breath, wheezing and stridor. Cardiovascular: Negative for chest pain, palpitations and leg swelling. Gastrointestinal: Positive for abdominal pain, constipation and diarrhea. Negative for anal bleeding, blood in stool, nausea and vomiting. Dysphagia   Endocrine: Negative for cold intolerance, heat intolerance, polydipsia, polyphagia and polyuria. Genitourinary: Negative for difficulty urinating, dysuria, flank pain, frequency, hematuria and urgency. Musculoskeletal: Positive for arthralgias and back pain. Negative for gait problem, joint swelling and myalgias. Skin: Negative for color change, pallor and rash. dry   Allergic/Immunologic: Negative for environmental allergies and food allergies. Neurological: Positive for tremors. Negative for dizziness, seizures, syncope, speech difficulty, light-headedness, numbness and headaches. Hematological: Negative for adenopathy.  Does not bruise/bleed easily. Psychiatric/Behavioral: Positive for confusion. Negative for agitation, behavioral problems, sleep disturbance and suicidal ideas. The patient is nervous/anxious. Irritable            Current Outpatient Medications:     amitriptyline (ELAVIL) 50 MG tablet, Take 1 tablet by mouth nightly, Disp: 90 tablet, Rfl: 1    denosumab (PROLIA) 60 MG/ML SOSY SC injection, Inject 1 mL into the skin once for 1 dose, Disp: 1 mL, Rfl: 0    lisinopril (PRINIVIL;ZESTRIL) 20 MG tablet, Take 1 tablet by mouth daily, Disp: 90 tablet, Rfl: 2    simvastatin (ZOCOR) 10 MG tablet, Take 1 tablet by mouth nightly, Disp: 90 tablet, Rfl: 2    LORazepam (ATIVAN) 1 MG tablet, TAKE 1 TABLET BY MOUTH THREE TIMES DAILY, Disp: , Rfl:     mirtazapine (REMERON) 30 MG tablet, TAKE 1 TABLET BY MOUTH EVERY DAY AT BEDTIME, Disp: , Rfl:     megestrol (MEGACE) 40 MG tablet, TAKE 1 TABLET BY MOUTH FOUR TIMES DAILY, Disp: , Rfl:     senna-docusate (PERICOLACE) 8.6-50 MG per tablet, Take 2 tablets by mouth daily as needed for Constipation, Disp: 60 tablet, Rfl: 5    dicyclomine (BENTYL) 10 MG capsule, Take one q 6 hours as needed for abdominal cramps, Disp: 60 capsule, Rfl: 2    vitamin D (ERGOCALCIFEROL) 1.25 MG (57531 UT) CAPS capsule, Take 1 capsule by mouth every 14 days, Disp: 6 capsule, Rfl: 2    Multiple Vitamins-Minerals (MULTIVITAMIN ADULT) TABS, Take by mouth daily, Disp: , Rfl:     aspirin (ASPIRIN ADULT LOW DOSE) 81 MG EC tablet, , Disp: , Rfl:     HYDROcodone-acetaminophen (NORCO)  MG per tablet, Take 1 tablet by mouth every 8 hours as needed for Pain ., Disp: , Rfl:     sertraline (ZOLOFT) 50 MG tablet, TAKE 1 TABLET BY MOUTH EVERY DAY, Disp: , Rfl:      Allergies   Allergen Reactions    Epinephrine Other (See Comments)     PATIENT SATES \"HEART PALPATATIONS. \"     Adhesive Tape Rash        Past Medical History:   Diagnosis Date    Abnormal Pap smear of cervix     Chronic obstructive lung disease ([facility] 7/9/2018    Chronic pain     Chronic respiratory failure with hypoxia ([facility] 6/6/2019    Depression     Hyperlipidemia     Hypertension     Osteoarthritis     Osteoporosis     Palpitations     Severe episode of recurrent major depressive disorder, without psychotic features ([facility] 6/19/2019    Sleep apnea     doesnt use her cpap       Health Maintenance Due   Topic Date Due    Hepatitis C screen  Never done    DTaP/Tdap/Td vaccine (1 - Tdap) 10/10/1962    Lipid screen  05/21/2021        Patient Active Problem List   Diagnosis    Low back pain    Degeneration of lumbar or lumbosacral intervertebral disc    Spinal stenosis    Depression    Chronic pain    Gastroesophageal reflux disease    Hyperlipidemia    Hypertension    Osteoporosis    Thyroid nodule    Hypoxia, sleep related    Need for tetanus, diphtheria, and acellular pertussis (Tdap) vaccine    Drug-induced constipation    Nausea    Abnormal urine odor    Urinary frequency    Hyperglycemia    Lower abdominal pain    Encounter for screening mammogram for malignant neoplasm of breast    Insomnia due to other mental disorder    History of uterine cancer    Family history of colon cancer    Family history of breast cancer    History of malignant neoplasm of breast        Past Surgical History:   Procedure Laterality Date    BACK SURGERY  04/19/2017    T7 spinal cord stimulator with Dr. Shannan Maciel      breast reconstruction w/implants    COLONOSCOPY      COSMETIC SURGERY      jaw surgery    ENDOSCOPY, COLON, DIAGNOSTIC      HYSTERECTOMY  2004    complete    MASTECTOMY  2000, 2014    left and right    OTHER SURGICAL HISTORY N/A 02/03/2017    stage 1  5 day spinal cord stimulator trial [employer]    SKIN BIOPSY      moles    TONSILLECTOMY          Family History   Problem Relation Age of Onset    No Known Problems Mother     No Known Problems Father         Social History     Tobacco Use    Smoking status: Former Smoker     Packs/day: 1.00     Years: 12.00     Pack years: 12.00     Types: Cigarettes     Start date: 1965     Quit date: 1974     Years since quittin.0    Smokeless tobacco: Never Used   Substance Use Topics    Alcohol use: No    Drug use: Yes     Comment: Norco, Lorazepam        Objective  Vitals:    21 0859   BP: 110/60   Site: Right Upper Arm   Position: Sitting   Cuff Size: Medium Adult   Pulse: 89   Temp: 98.1 °F (36.7 °C)   TempSrc: Temporal   SpO2: 97%   Weight: 130 lb (59 kg)   Height: 5' 5\" (1.651 m)        Exam:  Physical Exam  Vitals reviewed. Exam conducted with a chaperone present. Constitutional:       General: She is not in acute distress. Appearance: She is well-developed. She is not ill-appearing. Comments: Thin   HENT:      Head: Normocephalic and atraumatic. Right Ear: External ear normal.      Left Ear: External ear normal.      Mouth/Throat:      Pharynx: No oropharyngeal exudate. Eyes:      General: No scleral icterus. Right eye: No discharge. Left eye: No discharge. Conjunctiva/sclera: Conjunctivae normal.      Pupils: Pupils are equal, round, and reactive to light. Neck:      Thyroid: No thyromegaly. Cardiovascular:      Rate and Rhythm: Normal rate and regular rhythm. Heart sounds: Normal heart sounds. No murmur heard. No friction rub. No gallop. Pulmonary:      Effort: Pulmonary effort is normal. No respiratory distress. Breath sounds: Normal breath sounds. No wheezing or rales. Chest:      Chest wall: No tenderness. Abdominal:      General: Bowel sounds are normal. There is no distension. Palpations: Abdomen is soft. There is no mass. Tenderness: There is no abdominal tenderness. There is no guarding or rebound. Musculoskeletal:         General: Tenderness present. No deformity. Normal range of motion. Cervical back: Normal range of motion and neck supple. No rigidity. Lymphadenopathy:      Cervical: No cervical adenopathy. Skin:     General: Skin is warm and dry. Coloration: Skin is not pale. Findings: No erythema or rash. Neurological:      Mental Status: She is alert and oriented to person, place, and time. Cranial Nerves: No cranial nerve deficit. Sensory: No sensory deficit. Deep Tendon Reflexes: Reflexes normal.      Comments: Intention tremor   Psychiatric:         Mood and Affect: Mood normal.         Behavior: Behavior normal.         Thought Content: Thought content normal.         Judgment: Judgment normal.      Comments: Mood is sad          Last labs reviewed. ASSESSMENT & PLAN :     Hypertension:  Continue medications, her blood pressures are acceptable    Chronic respiratory failure with hypoxia  Not present per sleep specialist.  She does not need CPAP or nocturnal oxygen. Hypoxia, sleep related  No further problems. She is able to get good REM sleep and sleep specialist does not think she needs nocturnal oxygen    Gastroesophageal reflux disease  Stable, on no medications    Thyroid nodule  Benign by biopsy, will monitor  Was told by endocrinologist that her thyroid was okay. We will continue to monitor with an ultrasound of her thyroid in September 2021    Stage III chronic kidney disease  Resolved, no longer stage III now with stage II    Chronic pain  Continue Norco per pain management. For now. refer to Dr. Garrison Meckel , unfortunately medical marijuana caused memory problems so she is not on it    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 She will be seeing a new psychiatrist in [place]. Spinal stenosis  Take her Norco as directed.   CT scan of her spine showed facet arthropathy, spinal stenosis, disc disease and arthritis. Nothing that can be remedied by surgical approach    Hyperglycemia  Hemoglobin A1c was normal despite slightly increased fasting blood sugar    Chronic pain syndrome  Continue follow-up with pain management specialist.. History of osteoporosis  Prolia injection as per recommendation of her oncologist after 5/20/2021. prescription for Prolia 60 mg subcu x1 dose given    Hyperlipidemia  Come back for fasting lipid profile when she gets her Prolia injection. Continue her simvastatin and her diet    Encounter for screening mammogram  Requisition for screening mammogram given    Lower abdominal pain  Patient concerned that it has been over 5 years since her last colonoscopy which was in 2015. Letter from Dr. Luis Gallo states that she should get another one in about 5 years however she has always had one every 5 years. Refer to Dr. Kennedy Humphreys at family request to evaluate whether she should have another colonoscopy. Her last colonoscopy showed diverticulosis and hemorrhoids.   There is no evidence of any neoplastic or preneoplastic polyps on any of the previous colonoscopies  She takes dicyclomine on a as needed basis    Follow-up:  I will see her in about 3 months   When she gets her Prolia she will return to have it administered during at the same time she will get her fasting lipid profile done      Electronically signed    Camilo Cason,

## 2021-06-02 ENCOUNTER — NURSE ONLY (OUTPATIENT)
Dept: PRIMARY CARE CLINIC | Age: 76
End: 2021-06-02
Payer: MEDICARE

## 2021-06-02 DIAGNOSIS — M51.37 DEGENERATION OF LUMBAR OR LUMBOSACRAL INTERVERTEBRAL DISC: Primary | ICD-10-CM

## 2021-06-02 DIAGNOSIS — E78.2 MIXED HYPERLIPIDEMIA: ICD-10-CM

## 2021-06-02 LAB
CHOLESTEROL, TOTAL: 191 MG/DL (ref 0–199)
HDLC SERPL-MCNC: 59 MG/DL
LDL CHOLESTEROL CALCULATED: 105 MG/DL (ref 0–99)
TRIGL SERPL-MCNC: 136 MG/DL (ref 0–149)
VLDLC SERPL CALC-MCNC: 27 MG/DL

## 2021-06-02 PROCEDURE — 96372 THER/PROPH/DIAG INJ SC/IM: CPT | Performed by: INTERNAL MEDICINE

## 2021-06-24 PROBLEM — Z12.31 ENCOUNTER FOR SCREENING MAMMOGRAM FOR MALIGNANT NEOPLASM OF BREAST: Status: RESOLVED | Noted: 2021-05-25 | Resolved: 2021-06-24

## 2021-07-20 ENCOUNTER — TELEPHONE (OUTPATIENT)
Dept: PRIMARY CARE CLINIC | Age: 76
End: 2021-07-20

## 2021-07-20 DIAGNOSIS — R68.81 EARLY SATIETY: ICD-10-CM

## 2021-07-20 DIAGNOSIS — R11.2 NON-INTRACTABLE VOMITING WITH NAUSEA, UNSPECIFIED VOMITING TYPE: Primary | ICD-10-CM

## 2021-07-20 RX ORDER — ONDANSETRON 4 MG/1
4 TABLET, ORALLY DISINTEGRATING ORAL 3 TIMES DAILY PRN
Qty: 30 TABLET | Refills: 1 | Status: SHIPPED
Start: 2021-07-20 | End: 2021-08-26 | Stop reason: ALTCHOICE

## 2021-07-20 NOTE — TELEPHONE ENCOUNTER
Spoke with Patient. She just saw Dr John Davison and he did an upper and lower GI on her. She is seeing him again in a few weeks. She will try the zofran for the time being.

## 2021-07-20 NOTE — TELEPHONE ENCOUNTER
Trial ondansetron 4 mg 15 minutes before meals. Eat small meals. We will set her up for a upper GI with small bowel follow-through. Can do this at [facility] or [facility] let us know where she wants it done. Also she should call her GI doctor I believe she saw Dr. Bree Rdz and see if he can see her for this problem. If not we will try and squeeze her in when I get back in the office next week.   If she cannot wait until then to have her come in through [employer]

## 2021-07-20 NOTE — TELEPHONE ENCOUNTER
PATIENT CALLED IN STATING THAT EVERYTIME SHE EATS SHE GETS SICK.   SHE HAS NAUSEA ALL THE TIME AND AFTER EATING SHE GETS SEVERE BACK PAIN.  WANTS TO KNOW WHAT TO DO.

## 2021-08-26 ENCOUNTER — OFFICE VISIT (OUTPATIENT)
Dept: PRIMARY CARE CLINIC | Age: 76
End: 2021-08-26
Payer: MEDICARE

## 2021-08-26 VITALS
OXYGEN SATURATION: 94 % | WEIGHT: 131 LBS | BODY MASS INDEX: 21.83 KG/M2 | HEIGHT: 65 IN | SYSTOLIC BLOOD PRESSURE: 122 MMHG | DIASTOLIC BLOOD PRESSURE: 78 MMHG | TEMPERATURE: 97.3 F | HEART RATE: 94 BPM

## 2021-08-26 DIAGNOSIS — M81.6 LOCALIZED OSTEOPOROSIS WITHOUT CURRENT PATHOLOGICAL FRACTURE: ICD-10-CM

## 2021-08-26 DIAGNOSIS — F99 INSOMNIA DUE TO OTHER MENTAL DISORDER: ICD-10-CM

## 2021-08-26 DIAGNOSIS — F51.05 INSOMNIA DUE TO OTHER MENTAL DISORDER: ICD-10-CM

## 2021-08-26 DIAGNOSIS — G89.4 CHRONIC PAIN SYNDROME: ICD-10-CM

## 2021-08-26 DIAGNOSIS — R11.2 NON-INTRACTABLE VOMITING WITH NAUSEA: ICD-10-CM

## 2021-08-26 DIAGNOSIS — I10 ESSENTIAL HYPERTENSION: Primary | ICD-10-CM

## 2021-08-26 DIAGNOSIS — E04.1 THYROID NODULE: ICD-10-CM

## 2021-08-26 DIAGNOSIS — K21.9 GASTROESOPHAGEAL REFLUX DISEASE, UNSPECIFIED WHETHER ESOPHAGITIS PRESENT: ICD-10-CM

## 2021-08-26 DIAGNOSIS — K59.03 DRUG-INDUCED CONSTIPATION: ICD-10-CM

## 2021-08-26 DIAGNOSIS — R73.9 HYPERGLYCEMIA: ICD-10-CM

## 2021-08-26 DIAGNOSIS — N18.31 STAGE 3A CHRONIC KIDNEY DISEASE (HCC): ICD-10-CM

## 2021-08-26 DIAGNOSIS — E55.9 VITAMIN D DEFICIENCY: ICD-10-CM

## 2021-08-26 DIAGNOSIS — M51.37 DEGENERATION OF LUMBAR OR LUMBOSACRAL INTERVERTEBRAL DISC: Chronic | ICD-10-CM

## 2021-08-26 DIAGNOSIS — F33.2 SEVERE EPISODE OF RECURRENT MAJOR DEPRESSIVE DISORDER, WITHOUT PSYCHOTIC FEATURES (HCC): ICD-10-CM

## 2021-08-26 DIAGNOSIS — M81.0 OSTEOPOROSIS WITHOUT CURRENT PATHOLOGICAL FRACTURE, UNSPECIFIED OSTEOPOROSIS TYPE: ICD-10-CM

## 2021-08-26 DIAGNOSIS — E78.2 MIXED HYPERLIPIDEMIA: ICD-10-CM

## 2021-08-26 PROCEDURE — 4040F PNEUMOC VAC/ADMIN/RCVD: CPT | Performed by: INTERNAL MEDICINE

## 2021-08-26 PROCEDURE — G8399 PT W/DXA RESULTS DOCUMENT: HCPCS | Performed by: INTERNAL MEDICINE

## 2021-08-26 PROCEDURE — G8420 CALC BMI NORM PARAMETERS: HCPCS | Performed by: INTERNAL MEDICINE

## 2021-08-26 PROCEDURE — G8427 DOCREV CUR MEDS BY ELIG CLIN: HCPCS | Performed by: INTERNAL MEDICINE

## 2021-08-26 PROCEDURE — 1036F TOBACCO NON-USER: CPT | Performed by: INTERNAL MEDICINE

## 2021-08-26 PROCEDURE — 1090F PRES/ABSN URINE INCON ASSESS: CPT | Performed by: INTERNAL MEDICINE

## 2021-08-26 PROCEDURE — 1123F ACP DISCUSS/DSCN MKR DOCD: CPT | Performed by: INTERNAL MEDICINE

## 2021-08-26 PROCEDURE — 99214 OFFICE O/P EST MOD 30 MIN: CPT | Performed by: INTERNAL MEDICINE

## 2021-08-26 RX ORDER — AMITRIPTYLINE HYDROCHLORIDE 50 MG/1
50 TABLET, FILM COATED ORAL NIGHTLY
Qty: 90 TABLET | Refills: 1 | Status: SHIPPED | OUTPATIENT
Start: 2021-08-26 | End: 2022-01-04 | Stop reason: SDUPTHER

## 2021-08-26 RX ORDER — AMOXICILLIN 250 MG
2 CAPSULE ORAL DAILY PRN
Qty: 60 TABLET | Refills: 5 | Status: SHIPPED | OUTPATIENT
Start: 2021-08-26 | End: 2022-01-04 | Stop reason: SDUPTHER

## 2021-08-26 RX ORDER — PROMETHAZINE HYDROCHLORIDE 25 MG/1
TABLET ORAL
COMMUNITY
Start: 2021-08-18 | End: 2021-08-26 | Stop reason: SDUPTHER

## 2021-08-26 RX ORDER — LISINOPRIL 20 MG/1
20 TABLET ORAL DAILY
Qty: 90 TABLET | Refills: 2 | Status: SHIPPED | OUTPATIENT
Start: 2021-08-26 | End: 2022-07-21 | Stop reason: SDUPTHER

## 2021-08-26 RX ORDER — PROMETHAZINE HYDROCHLORIDE 25 MG/1
TABLET ORAL
Qty: 60 TABLET | Refills: 3 | Status: SHIPPED | OUTPATIENT
Start: 2021-08-26 | End: 2022-01-04 | Stop reason: SDUPTHER

## 2021-08-26 RX ORDER — SIMVASTATIN 10 MG
10 TABLET ORAL NIGHTLY
Qty: 90 TABLET | Refills: 2 | Status: SHIPPED | OUTPATIENT
Start: 2021-08-26 | End: 2022-07-21 | Stop reason: SDUPTHER

## 2021-08-26 RX ORDER — ARIPIPRAZOLE 2 MG/1
TABLET ORAL
COMMUNITY
Start: 2021-07-29 | End: 2022-01-04 | Stop reason: ALTCHOICE

## 2021-08-26 RX ORDER — ERGOCALCIFEROL 1.25 MG/1
50000 CAPSULE ORAL
Qty: 6 CAPSULE | Refills: 2 | Status: SHIPPED | OUTPATIENT
Start: 2021-08-26 | End: 2022-07-21 | Stop reason: SDUPTHER

## 2021-08-26 ASSESSMENT — ENCOUNTER SYMPTOMS
VOMITING: 0
STRIDOR: 0
EYE PAIN: 0
FACIAL SWELLING: 0
RHINORRHEA: 0
CONSTIPATION: 1
EYE DISCHARGE: 0
TROUBLE SWALLOWING: 0
SORE THROAT: 0
ANAL BLEEDING: 0
PHOTOPHOBIA: 0
NAUSEA: 0
EYE ITCHING: 0
DIARRHEA: 1
COLOR CHANGE: 0
WHEEZING: 0
BLOOD IN STOOL: 0
ABDOMINAL PAIN: 1
BACK PAIN: 1
SHORTNESS OF BREATH: 0
COUGH: 0
ROS SKIN COMMENTS: DRY

## 2021-08-26 NOTE — PROGRESS NOTES
2021    Name: Berkley Miramontes : 1945 Sex: female  Age: 66 y.o. Subjective:  No chief complaint on file. Hypertension  Pertinent negatives include no chest pain, headaches, palpitations or shortness of breath. Her new psychiatrist started her on Abilify 2 mg and she reports marked improvement in her depression and anxiety. This is the first time in years they have actually seen her smile and appear happy. Her  says that she is doing much better with the new medications. They have not worsened her constipation which is chronic. She continues on her lorazepam, mirtazapine, sertraline and amitriptyline. She saw Dr. Meme Solis who felt she did not need a CPAP or nocturnal oxygen. He started her on lower dose amitriptyline 25 mg which was then increased to 50 mg. Patient states she feels so much better on this, she gets a full night sleep and has no side effects of dry mouth dizziness. She had unintentional weight loss. She is lost about 35  pounds since 2020. The weight loss has stabilized since the her oncologist started her on Megace 40 mg a day and mirtazapine 30 mg at bedtime. Her weight went up from 123 pounds to 132 pounds . Today's weight is down to 131. Her  says that she is eating much better. .  She  has dysphagia which apparently was worse after GI stretched her esophagus. She went to see Dr. Colten Zapata. Reviewed his report. He did not feel she needed a repeat colonoscopy. He recommended continuing with a single small volume saline enema for relief of her chronic narcotic induced constipation. She also takes Metamucil and MiraLAX daily. And continues on Dulcolax suppository as needed. Despite this she still has chronic constipation. She was tried on Linzess but this was too expensive. She does not remember trying Amitiza although this was noted on a GI consult this year    Dr. Kayleen Pack did a panendoscopy on her in July of this year. November 25, 2021. Hai Santos She sees Dr. Yovani Rangel for follow-up on her breast cancer. She had a repeat CT scan of her chest  as she had some abnormalities on the last one. The repeat CAT scan showed resolution of the pulmonary abnormalities and her oncologist is happy with the results. She had a history of CKD stage III. We  Checked  for secondary hyperparathyroidism. Calcium was normal at 9.5 and intact PTH was normal at 30. Unlikely to have hyperparathyroidism  We  rechecked her kidney functions and her estimated GFR is 54. She complains of urinary frequency but cannot take medication because it worsens constipation    Review of Systems   Constitutional: Positive for appetite change. Negative for unexpected weight change. HENT: Negative for congestion, ear pain, facial swelling, rhinorrhea, sore throat, tinnitus and trouble swallowing. Thinning hais   Eyes: Negative for photophobia, pain, discharge, itching and visual disturbance. Respiratory: Negative for cough, shortness of breath, wheezing and stridor. Cardiovascular: Negative for chest pain, palpitations and leg swelling. Gastrointestinal: Positive for abdominal pain, constipation and diarrhea. Negative for anal bleeding, blood in stool, nausea and vomiting. Dysphagia   Endocrine: Negative for cold intolerance, heat intolerance, polydipsia, polyphagia and polyuria. Genitourinary: Negative for difficulty urinating, dysuria, flank pain, frequency, hematuria and urgency. Musculoskeletal: Positive for arthralgias and back pain. Negative for gait problem, joint swelling and myalgias. Skin: Negative for color change, pallor and rash. dry   Allergic/Immunologic: Negative for environmental allergies and food allergies. Neurological: Positive for tremors. Negative for dizziness, seizures, syncope, speech difficulty, light-headedness, numbness and headaches. Hematological: Negative for adenopathy.  Does not bruise/bleed easily. Psychiatric/Behavioral: Positive for confusion. Negative for agitation, behavioral problems, sleep disturbance and suicidal ideas. The patient is nervous/anxious. Irritable            Current Outpatient Medications:     ondansetron (ZOFRAN-ODT) 4 MG disintegrating tablet, Take 1 tablet by mouth 3 times daily as needed for Nausea or Vomiting, Disp: 30 tablet, Rfl: 1    amitriptyline (ELAVIL) 50 MG tablet, Take 1 tablet by mouth nightly, Disp: 90 tablet, Rfl: 1    denosumab (PROLIA) 60 MG/ML SOSY SC injection, Inject 1 mL into the skin once for 1 dose, Disp: 1 mL, Rfl: 0    sertraline (ZOLOFT) 50 MG tablet, TAKE 1 TABLET BY MOUTH EVERY DAY, Disp: , Rfl:     lisinopril (PRINIVIL;ZESTRIL) 20 MG tablet, Take 1 tablet by mouth daily, Disp: 90 tablet, Rfl: 2    simvastatin (ZOCOR) 10 MG tablet, Take 1 tablet by mouth nightly, Disp: 90 tablet, Rfl: 2    LORazepam (ATIVAN) 1 MG tablet, TAKE 1 TABLET BY MOUTH THREE TIMES DAILY, Disp: , Rfl:     mirtazapine (REMERON) 30 MG tablet, TAKE 1 TABLET BY MOUTH EVERY DAY AT BEDTIME, Disp: , Rfl:     megestrol (MEGACE) 40 MG tablet, TAKE 1 TABLET BY MOUTH FOUR TIMES DAILY, Disp: , Rfl:     senna-docusate (PERICOLACE) 8.6-50 MG per tablet, Take 2 tablets by mouth daily as needed for Constipation, Disp: 60 tablet, Rfl: 5    dicyclomine (BENTYL) 10 MG capsule, Take one q 6 hours as needed for abdominal cramps, Disp: 60 capsule, Rfl: 2    vitamin D (ERGOCALCIFEROL) 1.25 MG (67343 UT) CAPS capsule, Take 1 capsule by mouth every 14 days, Disp: 6 capsule, Rfl: 2    Multiple Vitamins-Minerals (MULTIVITAMIN ADULT) TABS, Take by mouth daily, Disp: , Rfl:     aspirin (ASPIRIN ADULT LOW DOSE) 81 MG EC tablet, , Disp: , Rfl:     HYDROcodone-acetaminophen (NORCO)  MG per tablet, Take 1 tablet by mouth every 8 hours as needed for Pain ., Disp: , Rfl:      Allergies   Allergen Reactions    Epinephrine Other (See Comments)     PATIENT SATES \"HEART PALPATATIONS. \"  Adhesive Tape Rash        Past Medical History:   Diagnosis Date    Abnormal Pap smear of cervix     Chronic obstructive lung disease ([facility] [address] 7/9/2018    Chronic pain     Chronic respiratory failure with hypoxia (HCC) 6/6/2019    Depression     Hyperlipidemia     Hypertension     Osteoarthritis     Osteoporosis     Palpitations     Severe episode of recurrent major depressive disorder, without psychotic features ([facility] [address] 6/19/2019    Sleep apnea     doesnt use her cpap       Health Maintenance Due   Topic Date Due    Hepatitis C screen  Never done    DTaP/Tdap/Td vaccine (1 - Tdap) 03/21/2021        Patient Active Problem List   Diagnosis    Low back pain    Degeneration of lumbar or lumbosacral intervertebral disc    Spinal stenosis    Depression    Chronic pain    Gastroesophageal reflux disease    Hyperlipidemia    Hypertension    Osteoporosis    Thyroid nodule    Hypoxia, sleep related    Need for tetanus, diphtheria, and acellular pertussis (Tdap) vaccine    Drug-induced constipation    Non-intractable vomiting with nausea    Abnormal urine odor    Urinary frequency    Hyperglycemia    Lower abdominal pain    Insomnia due to other mental disorder    History of uterine cancer    Family history of colon cancer    Family history of breast cancer    History of malignant neoplasm of breast        Past Surgical History:   Procedure Laterality Date    BACK SURGERY  04/19/2017    T7 spinal cord stimulator with Dr. Will Ryder      breast reconstruction w/implants    COLONOSCOPY      COSMETIC SURGERY      jaw surgery    ENDOSCOPY, COLON, DIAGNOSTIC      HYSTERECTOMY  2004    complete    MASTECTOMY  2000, 2014    left and right    OTHER SURGICAL HISTORY N/A 02/03/2017    stage 1  5 day spinal cord stimulator trial [place]    SKIN BIOPSY      moles    TONSILLECTOMY          Family History   Problem Relation Age of Onset    No Known Problems Mother    Blase Liming No Known Problems Father         Social History     Tobacco Use    Smoking status: Former Smoker     Packs/day: 1.00     Years: 12.00     Pack years: 12.00     Types: Cigarettes     Start date: 1965     Quit date: 1974     Years since quittin.3    Smokeless tobacco: Never Used   Substance Use Topics    Alcohol use: No    Drug use: Yes     Comment: Norco, Lorazepam        Objective  There were no vitals filed for this visit. Exam:  Physical Exam  Vitals reviewed. Exam conducted with a chaperone present. Constitutional:       General: She is not in acute distress. Appearance: She is well-developed. She is not ill-appearing. Comments: Thin   HENT:      Head: Normocephalic and atraumatic. Right Ear: External ear normal.      Left Ear: External ear normal.      Mouth/Throat:      Pharynx: No oropharyngeal exudate. Eyes:      General: No scleral icterus. Right eye: No discharge. Left eye: No discharge. Conjunctiva/sclera: Conjunctivae normal.      Pupils: Pupils are equal, round, and reactive to light. Neck:      Thyroid: No thyromegaly. Cardiovascular:      Rate and Rhythm: Normal rate and regular rhythm. Heart sounds: Normal heart sounds. No murmur heard. No friction rub. No gallop. Pulmonary:      Effort: Pulmonary effort is normal. No respiratory distress. Breath sounds: Normal breath sounds. No wheezing or rales. Chest:      Chest wall: No tenderness. Abdominal:      General: Bowel sounds are normal. There is no distension. Palpations: Abdomen is soft. There is no mass. Tenderness: There is no abdominal tenderness. There is no guarding or rebound. Musculoskeletal:         General: Tenderness present. No deformity. Normal range of motion. Cervical back: Normal range of motion and neck supple. No rigidity. Lymphadenopathy:      Cervical: No cervical adenopathy. Skin:     General: Skin is warm and dry.       Coloration: Skin is not pale. Findings: No erythema or rash. Neurological:      Mental Status: She is alert and oriented to person, place, and time. Cranial Nerves: No cranial nerve deficit. Sensory: No sensory deficit. Deep Tendon Reflexes: Reflexes normal.      Comments: Intention tremor   Psychiatric:         Mood and Affect: Mood normal.         Behavior: Behavior normal.         Thought Content: Thought content normal.         Judgment: Judgment normal.      Comments: Mood is sad          Last labs reviewed. ASSESSMENT & PLAN :     Hypertension:  Continue medications, her blood pressures are acceptable    Chronic respiratory failure with hypoxia  Not present per sleep specialist.  She does not need CPAP or nocturnal oxygen. Hypoxia, sleep related  No further problems. She is able to get good REM sleep and sleep specialist does not think she needs nocturnal oxygen    Gastroesophageal reflux disease  Stable, on no medications    Thyroid nodule  Benign by biopsy, will monitor  Was told by endocrinologist that her thyroid was okay. We will continue to monitor with an ultrasound of her thyroid in September 2021    Stage III chronic kidney disease  Resolved, no longer stage III now with stage II    Chronic pain  Continue Norco per pain management. For now. refer to Dr. Frank Issa , unfortunately medical marijuana caused memory problems so she is not on it    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 She will be seeing a new psychiatrist in [place]. Spinal stenosis  Take her Norco as directed. CT scan of her spine showed facet arthropathy, spinal stenosis, disc disease and arthritis.   Nothing that can be remedied by surgical approach    Hyperglycemia  Hemoglobin A1c was normal despite slightly increased fasting blood sugar    Chronic pain syndrome  Continue follow-up with pain management specialist.. History of osteoporosis  Prolia injection as per recommendation of her oncologist after 11/25/2021. prescription for Prolia 60 mg subcu x1 dose will be  given about 2 weeks prior to that date. Hyperlipidemia    Continue her simvastatin and her diet    Chronic kidney disease stage IIIa  Check CBC and CMP to make sure it is not worsening all the medications that she is on. Avoid NSAID use. Chronic depression  Continue follow-up with psychiatrist.  Markie Kong present combination of psychotropic medications that it is working for her. Vitamin D deficiency  Check 25-hydroxy vitamin D level.   Continue her per present dose of 50,000 IUs every 2 weeks    Follow-up:  I will see her in about 3 months         Electronically signed    Daniel Cueto DO

## 2021-10-07 ENCOUNTER — OFFICE VISIT (OUTPATIENT)
Dept: PRIMARY CARE CLINIC | Age: 76
End: 2021-10-07
Payer: MEDICARE

## 2021-10-07 VITALS
HEIGHT: 65 IN | WEIGHT: 130 LBS | DIASTOLIC BLOOD PRESSURE: 70 MMHG | BODY MASS INDEX: 21.66 KG/M2 | SYSTOLIC BLOOD PRESSURE: 118 MMHG | TEMPERATURE: 97.9 F

## 2021-10-07 DIAGNOSIS — E78.2 MIXED HYPERLIPIDEMIA: ICD-10-CM

## 2021-10-07 DIAGNOSIS — F33.2 SEVERE EPISODE OF RECURRENT MAJOR DEPRESSIVE DISORDER, WITHOUT PSYCHOTIC FEATURES (HCC): ICD-10-CM

## 2021-10-07 DIAGNOSIS — Z23 NEED FOR INFLUENZA VACCINATION: ICD-10-CM

## 2021-10-07 DIAGNOSIS — I10 PRIMARY HYPERTENSION: ICD-10-CM

## 2021-10-07 DIAGNOSIS — Z01.818 ENCOUNTER FOR PRE-OPERATIVE EXAMINATION: Primary | ICD-10-CM

## 2021-10-07 DIAGNOSIS — M81.6 LOCALIZED OSTEOPOROSIS WITHOUT CURRENT PATHOLOGICAL FRACTURE: ICD-10-CM

## 2021-10-07 PROCEDURE — G8420 CALC BMI NORM PARAMETERS: HCPCS | Performed by: INTERNAL MEDICINE

## 2021-10-07 PROCEDURE — 1123F ACP DISCUSS/DSCN MKR DOCD: CPT | Performed by: INTERNAL MEDICINE

## 2021-10-07 PROCEDURE — 1090F PRES/ABSN URINE INCON ASSESS: CPT | Performed by: INTERNAL MEDICINE

## 2021-10-07 PROCEDURE — 99214 OFFICE O/P EST MOD 30 MIN: CPT | Performed by: INTERNAL MEDICINE

## 2021-10-07 PROCEDURE — 90694 VACC AIIV4 NO PRSRV 0.5ML IM: CPT | Performed by: INTERNAL MEDICINE

## 2021-10-07 PROCEDURE — 93000 ELECTROCARDIOGRAM COMPLETE: CPT | Performed by: INTERNAL MEDICINE

## 2021-10-07 PROCEDURE — G8484 FLU IMMUNIZE NO ADMIN: HCPCS | Performed by: INTERNAL MEDICINE

## 2021-10-07 PROCEDURE — G8399 PT W/DXA RESULTS DOCUMENT: HCPCS | Performed by: INTERNAL MEDICINE

## 2021-10-07 PROCEDURE — 1036F TOBACCO NON-USER: CPT | Performed by: INTERNAL MEDICINE

## 2021-10-07 PROCEDURE — G0008 ADMIN INFLUENZA VIRUS VAC: HCPCS | Performed by: INTERNAL MEDICINE

## 2021-10-07 PROCEDURE — G8427 DOCREV CUR MEDS BY ELIG CLIN: HCPCS | Performed by: INTERNAL MEDICINE

## 2021-10-07 PROCEDURE — 4040F PNEUMOC VAC/ADMIN/RCVD: CPT | Performed by: INTERNAL MEDICINE

## 2021-10-07 ASSESSMENT — ENCOUNTER SYMPTOMS
TROUBLE SWALLOWING: 0
ABDOMINAL PAIN: 0
SHORTNESS OF BREATH: 0
ANAL BLEEDING: 0
FACIAL SWELLING: 0
BACK PAIN: 1
EYE DISCHARGE: 0
EYE ITCHING: 0
DIARRHEA: 0
COUGH: 0
RHINORRHEA: 0
CONSTIPATION: 1
SORE THROAT: 0
NAUSEA: 0
STRIDOR: 0
WHEEZING: 0
VOMITING: 0
ROS SKIN COMMENTS: DRY
PHOTOPHOBIA: 0
COLOR CHANGE: 0
EYE PAIN: 0
BLOOD IN STOOL: 0

## 2021-10-07 NOTE — PATIENT INSTRUCTIONS
Patient Education        Influenza (Flu) Vaccine (Inactivated or Recombinant): What You Need to Know  Why get vaccinated? Influenza vaccine can prevent influenza (flu). Flu is a contagious disease that spreads around the [place] every year, usually between October and May. Anyone can get the flu, but it is more dangerous for some people. Infants and young children, people 72years of age and older, pregnant women, and people with certain health conditions or a weakened immune system are at greatest risk of flu complications. Pneumonia, bronchitis, sinus infections and ear infections are examples of flu-related complications. If you have a medical condition, such as heart disease, cancer or diabetes, flu can make it worse. Flu can cause fever and chills, sore throat, muscle aches, fatigue, cough, headache, and runny or stuffy nose. Some people may have vomiting and diarrhea, though this is more common in children than adults. Each year, thousands of people in the [facility] die from flu, and many more are hospitalized. Flu vaccine prevents millions of illnesses and flu-related visits to the doctor each year. Influenza vaccine  CDC recommends everyone 10months of age and older get vaccinated every flu season. Children 6 months through 6years of age may need 2 doses during a single flu season. Everyone else needs only 1 dose each flu season. It takes about 2 weeks for protection to develop after vaccination. There are many flu viruses, and they are always changing. Each year a new flu vaccine is made to protect against three or four viruses that are likely to cause disease in the upcoming flu season. Even when the vaccine doesn't exactly match these viruses, it may still provide some protection. Influenza vaccine does not cause flu. Influenza vaccine may be given at the same time as other vaccines.   Talk with your health care provider  Tell your vaccine provider if the person getting the vaccine:  · Has had an allergic reaction after a previous dose of influenza vaccine, or has any severe, life-threatening allergies. · Has ever had Guillain-Barré Syndrome (also called GBS). In some cases, your health care provider may decide to postpone influenza vaccination to a future visit. People with minor illnesses, such as a cold, may be vaccinated. People who are moderately or severely ill should usually wait until they recover before getting influenza vaccine. Your health care provider can give you more information. Risks of a vaccine reaction  · Soreness, redness, and swelling where shot is given, fever, muscle aches, and headache can happen after influenza vaccine. · There may be a very small increased risk of Guillain-Barré Syndrome (GBS) after inactivated influenza vaccine (the flu shot). Macy Black children who get the flu shot along with pneumococcal vaccine (PCV13), and/or DTaP vaccine at the same time might be slightly more likely to have a seizure caused by fever. Tell your health care provider if a child who is getting flu vaccine has ever had a seizure. People sometimes faint after medical procedures, including vaccination. Tell your provider if you feel dizzy or have vision changes or ringing in the ears. As with any medicine, there is a very remote chance of a vaccine causing a severe allergic reaction, other serious injury, or death. What if there is a serious problem? An allergic reaction could occur after the vaccinated person leaves the clinic. If you see signs of a severe allergic reaction (hives, swelling of the face and throat, difficulty breathing, a fast heartbeat, dizziness, or weakness), call 9-1-1 and get the person to the nearest hospital.  For other signs that concern you, call your health care provider. Adverse reactions should be reported to the Vaccine Adverse Event Reporting System (VAERS).  Your health care provider will usually file this report, or you can do it yourself. Visit the VAERS website at www.vaers. hhs.gov or call 1-353.698.8800. VAERS is only for reporting reactions, and VAERS staff do not give medical advice. The National Vaccine Injury Compensation Program  The National Vaccine Injury Compensation Program (VICP) is a federal program that was created to compensate people who may have been injured by certain vaccines. Visit the VICP website at www.[facility].gov/vaccinecompensation or call 0-271.846.6881 to learn about the program and about filing a claim. There is a time limit to file a claim for compensation. How can I learn more? · Ask your healthcare provider. · Call your local or state health department. · Contact the Centers for Disease Control and Prevention (CDC):  ? Call 5-841.596.8345 (1-800-CDC-INFO) or  ? Visit CDC's website at www.cdc.gov/flu  Vaccine Information Statement (Interim)  Inactivated Influenza Vaccine  8/15/2019  [address] [id number]-[id number]  Department of Health and Human Services  Centers for Disease Control and Prevention  Many Vaccine Information Statements are available in Frisian and other languages. See www.immunize.org/vis. Muchas hojas de información sobre vacunas están disponibles en español y en otros idiomas. Visite www.immunize.org/vis. Care instructions adapted under license by [facility] ([facility]). If you have questions about a medical condition or this instruction, always ask your healthcare professional. [place] [address] any warranty or liability for your use of this information.

## 2021-10-07 NOTE — PROGRESS NOTES
10/7/2021    Name: Norma Vasquez : 1945 Sex: female  Age: 66 y.o. Subjective:  No chief complaint on file. Hypertension  Pertinent negatives include no chest pain, headaches, palpitations or shortness of breath. Patient here for preoperative clearance. She is to have surgery done on her right hand by Dr. Fantasma Pina later this month. Form filled out and will be sent to him. She had blood work, CBC and CMP done in August and we will send reports to him. I will do an EKG on her today. Her new psychiatrist started her on Abilify which was increased to 4  mg and she reports marked improvement in her depression and anxiety. This is the first time in years they have actually seen her smile and appear happy. Her  says that she is doing much better with the new medications. They have not worsened her constipation which is chronic. She continues on her lorazepam, mirtazapine, sertraline and amitriptyline. She saw Dr. Pedro Vora who felt she did not need a CPAP or nocturnal oxygen. He started her on lower dose amitriptyline 25 mg which was then increased to 50 mg. Patient states she feels so much better on this, she gets a full night sleep and has no side effects of dry mouth dizziness. She had unintentional weight loss. She is lost about 35  pounds since 2020. The weight loss has stabilized since the her oncologist started her on Megace 40 mg a day and mirtazapine 30 mg at bedtime. Her weight went up from 123 pounds to 132 pounds . Today's weight is down to 131. Her  says that she is eating much better. .  She  has dysphagia which apparently was worse after GI stretched her esophagus. She went to see Dr. Vineet Harrison. Reviewed his report. He did not feel she needed a repeat colonoscopy. He recommended continuing with a single small volume saline enema for relief of her chronic narcotic induced constipation. She also takes Metamucil and MiraLAX daily.   And continues on Dulcolax suppository as needed. Despite this she still has chronic constipation. She was tried on Linzess but this was too expensive. She does not remember trying Amitiza although this was noted on a GI consult this year    Dr. Sonam Garrison did a panendoscopy on her in July of this year. Colonoscopy showed 2 polyps which apparently were \"precancerous\" and repeat scope will be scheduled in 2 years. She also had sigmoid diverticulosis. The EGD showed severe reflux esophagitis. She continues on a PPI. Her  says her chronic constipation is the most bothersome thing in her life. 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 She tried medical marijuana but it caused her to become confused and so she stopped it. .      She had a ultrasound of her thyroid to monitor her multinodular goiter. 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 Previous free T4 and TSH have been normal.  We did a thyroid nuclear uptake and scan which was normal.  Per her request she was referred to endocrinologist for further evaluation. She complains of increased irritability, dry skin, thin nails and chronic fatigue. All symptoms of possible underlying hypothyroidism. Recheck TSH and free T4 were still normal.  Thyroid studies including ultrasound, scan and uptake were all normal..      She continues to see pain management who has her on opioids. If she does not take the opioid she is unable to function because of chronic back pain. She had a CT scan of her lumbar spine which showed degenerative disc disease multiple levels, facet arthropathy, spinal stenosis, disc bulging with impingement on neural foramina but nothing that is surgically fixable. She says the only time she feels free of pain is when she takes her hydrocodone and her lorazepam 3 times a day.   The Phenergan does help her nausea but not as good Skin: Negative for color change, pallor and rash. dry   Allergic/Immunologic: Negative for environmental allergies and food allergies. Neurological: Negative for dizziness, tremors, seizures, syncope, speech difficulty, light-headedness, numbness and headaches. Hematological: Negative for adenopathy. Does not bruise/bleed easily. Psychiatric/Behavioral: Negative for agitation, behavioral problems, confusion, sleep disturbance and suicidal ideas. The patient is nervous/anxious.          Irritable            Current Outpatient Medications:     ARIPiprazole (ABILIFY) 2 MG tablet, TAKE ONE TABLET BY MOUTH AT BEDTIME, Disp: , Rfl:     senna-docusate (PERICOLACE) 8.6-50 MG per tablet, Take 2 tablets by mouth daily as needed for Constipation, Disp: 60 tablet, Rfl: 5    promethazine (PHENERGAN) 25 MG tablet, TAKE 1 TABLET BY MOUTH TWICE DAILY, Disp: 60 tablet, Rfl: 3    simvastatin (ZOCOR) 10 MG tablet, Take 1 tablet by mouth nightly, Disp: 90 tablet, Rfl: 2    amitriptyline (ELAVIL) 50 MG tablet, Take 1 tablet by mouth nightly, Disp: 90 tablet, Rfl: 1    lisinopril (PRINIVIL;ZESTRIL) 20 MG tablet, Take 1 tablet by mouth daily, Disp: 90 tablet, Rfl: 2    vitamin D (ERGOCALCIFEROL) 1.25 MG (21537 UT) CAPS capsule, Take 1 capsule by mouth every 14 days, Disp: 6 capsule, Rfl: 2    denosumab (PROLIA) 60 MG/ML SOSY SC injection, Inject 1 mL into the skin once for 1 dose, Disp: 1 mL, Rfl: 0    sertraline (ZOLOFT) 50 MG tablet, TAKE 1 TABLET BY MOUTH EVERY DAY, Disp: , Rfl:     LORazepam (ATIVAN) 1 MG tablet, TAKE 1 TABLET BY MOUTH THREE TIMES DAILY, Disp: , Rfl:     mirtazapine (REMERON) 30 MG tablet, TAKE 1 TABLET BY MOUTH EVERY DAY AT BEDTIME, Disp: , Rfl:     Multiple Vitamins-Minerals (MULTIVITAMIN ADULT) TABS, Take by mouth daily, Disp: , Rfl:     aspirin (ASPIRIN ADULT LOW DOSE) 81 MG EC tablet, , Disp: , Rfl:     HYDROcodone-acetaminophen (NORCO)  MG per tablet, Take 1 tablet by mouth every 8 hours as needed for Pain ., Disp: , Rfl:      Allergies   Allergen Reactions    Epinephrine Other (See Comments)     PATIENT SATES \"HEART PALPATATIONS. \"     Adhesive Tape Rash        Past Medical History:   Diagnosis Date    Abnormal Pap smear of cervix     Chronic obstructive lung disease ([facility] [address] 7/9/2018    Chronic pain     Chronic respiratory failure with hypoxia (HCC) 6/6/2019    Depression     Hyperlipidemia     Hypertension     Osteoarthritis     Osteoporosis     Palpitations     Severe episode of recurrent major depressive disorder, without psychotic features ([facility] [address] 6/19/2019    Sleep apnea     doesnt use her cpap       Health Maintenance Due   Topic Date Due    Hepatitis C screen  Never done    DTaP/Tdap/Td vaccine (1 - Tdap) 03/21/2021    Flu vaccine (1) 09/01/2021        Patient Active Problem List   Diagnosis    Low back pain    Degeneration of lumbar or lumbosacral intervertebral disc    Spinal stenosis    Depression    Chronic pain    Gastroesophageal reflux disease    Hyperlipidemia    Hypertension    Osteoporosis    Thyroid nodule    Hypoxia, sleep related    Need for tetanus, diphtheria, and acellular pertussis (Tdap) vaccine    Drug-induced constipation    Stage 3 chronic kidney disease (HCC)    Non-intractable vomiting with nausea    Abnormal urine odor    Urinary frequency    Hyperglycemia    Lower abdominal pain    Insomnia due to other mental disorder    History of uterine cancer    Family history of colon cancer    Family history of breast cancer    History of malignant neoplasm of breast        Past Surgical History:   Procedure Laterality Date    BACK SURGERY  04/19/2017    T7 spinal cord stimulator with Dr. Doty Slice      breast reconstruction w/implants    COLONOSCOPY      COSMETIC SURGERY      jaw surgery    ENDOSCOPY, COLON, DIAGNOSTIC      HYSTERECTOMY  2004    complete    MASTECTOMY  2000, 2014    left and right    OTHER SURGICAL HISTORY N/A 2017    stage 1  5 day spinal cord stimulator trial [facility]    SKIN BIOPSY      moles    TONSILLECTOMY          Family History   Problem Relation Age of Onset    No Known Problems Mother     No Known Problems Father         Social History     Tobacco Use    Smoking status: Former Smoker     Packs/day: 1.00     Years: 12.00     Pack years: 12.00     Types: Cigarettes     Start date: 1965     Quit date: 1974     Years since quittin.3    Smokeless tobacco: Never Used   Substance Use Topics    Alcohol use: No    Drug use: Yes     Comment: Norco, Lorazepam        Objective  There were no vitals filed for this visit. Exam:  Physical Exam  Vitals reviewed. Exam conducted with a chaperone present. Constitutional:       General: She is not in acute distress. Appearance: She is well-developed. She is not ill-appearing. Comments: Thin   HENT:      Head: Normocephalic and atraumatic. Right Ear: External ear normal.      Left Ear: External ear normal.      Mouth/Throat:      Pharynx: No oropharyngeal exudate. Eyes:      General: No scleral icterus. Right eye: No discharge. Left eye: No discharge. Conjunctiva/sclera: Conjunctivae normal.      Pupils: Pupils are equal, round, and reactive to light. Neck:      Thyroid: No thyromegaly. Cardiovascular:      Rate and Rhythm: Normal rate and regular rhythm. Heart sounds: Normal heart sounds. No murmur heard. No friction rub. No gallop. Pulmonary:      Effort: Pulmonary effort is normal. No respiratory distress. Breath sounds: Normal breath sounds. No wheezing or rales. Chest:      Chest wall: No tenderness. Abdominal:      General: Bowel sounds are normal. There is no distension. Palpations: Abdomen is soft. There is no mass. Tenderness: There is no abdominal tenderness. There is no guarding or rebound.    Musculoskeletal:         General: Tenderness present. No deformity. Normal range of motion. Cervical back: Normal range of motion and neck supple. No rigidity. Lymphadenopathy:      Cervical: No cervical adenopathy. Skin:     General: Skin is warm and dry. Coloration: Skin is not pale. Findings: No erythema or rash. Neurological:      Mental Status: She is alert and oriented to person, place, and time. Cranial Nerves: No cranial nerve deficit. Sensory: No sensory deficit. Deep Tendon Reflexes: Reflexes normal.      Comments: Intention tremor   Psychiatric:         Mood and Affect: Mood normal.         Behavior: Behavior normal.         Thought Content: Thought content normal.         Judgment: Judgment normal.      Comments: Mood is sad          Last labs reviewed. ASSESSMENT & PLAN :     Encounter for preoperative examination:  Medically optimized for surgery. EKG done which showed no acute changes  Form filled out and will be faxed along with her EKG and blood work to Dr. Lorin Valenzuela    Hypertension:  Continue medications, her blood pressures are acceptable    Chronic respiratory failure with hypoxia  Not present per sleep specialist.  She does not need CPAP or nocturnal oxygen. Hypoxia, sleep related  No further problems. She is able to get good REM sleep and sleep specialist does not think she needs nocturnal oxygen    Gastroesophageal reflux disease  Stable, on no medications    Thyroid nodule  Benign by biopsy, will monitor  Was told by endocrinologist that her thyroid was okay. We will continue to monitor with an ultrasound of her thyroid in September 2021    Stage III chronic kidney disease  Resolved, no longer stage III now with stage II    Chronic pain  Continue Norco per pain management. For now.   refer to Dr. Alix Gaona , unfortunately medical marijuana caused memory problems so she is not on it    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 Cloteal Osman Spinal stenosis  Take her Norco as directed. CT scan of her spine showed facet arthropathy, spinal stenosis, disc disease and arthritis. Nothing that can be remedied by surgical approach    Hyperglycemia  Hemoglobin A1c was normal despite slightly increased fasting blood sugar    Chronic pain syndrome  Continue follow-up with pain management specialist.. History of osteoporosis  Prolia injection as per recommendation of her oncologist after 11/25/2021. prescription for Prolia 60 mg subcu x1 dose  given . Hyperlipidemia    Continue her simvastatin and her diet    Chronic kidney disease stage IIIa  Check CBC and CMP to make sure it is not worsening all the medications that she is on. Avoid NSAID use. Chronic depression  Continue follow-up with psychiatrist.  Alberto Castellanos present combination of psychotropic medications that it is working for her. Vitamin D deficiency  Check 25-hydroxy vitamin D level.   Continue her per present dose of 50,000 IUs every 2 weeks    Need for influenza vaccine:  Senior flu shot given    Follow-up:  I will see her on a regular scheduled appointment November 29, 2021        Electronically signed    Mina Linares DO

## 2021-10-15 LAB
ALBUMIN SERPL-MCNC: NORMAL G/DL
ALP BLD-CCNC: NORMAL U/L
ALT SERPL-CCNC: NORMAL U/L
ANION GAP SERPL CALCULATED.3IONS-SCNC: NORMAL MMOL/L
AST SERPL-CCNC: NORMAL U/L
AVERAGE GLUCOSE: NORMAL
BILIRUB SERPL-MCNC: NORMAL MG/DL
BUN BLDV-MCNC: NORMAL MG/DL
CALCIUM SERPL-MCNC: NORMAL MG/DL
CHLORIDE BLD-SCNC: NORMAL MMOL/L
CHOLESTEROL, TOTAL: NORMAL
CHOLESTEROL/HDL RATIO: NORMAL
CO2: NORMAL
CREAT SERPL-MCNC: NORMAL MG/DL
GFR CALCULATED: NORMAL
GLUCOSE BLD-MCNC: NORMAL MG/DL
HBA1C MFR BLD: 5.5 %
HDLC SERPL-MCNC: NORMAL MG/DL
LDL CHOLESTEROL CALCULATED: NORMAL
NONHDLC SERPL-MCNC: NORMAL MG/DL
POTASSIUM SERPL-SCNC: NORMAL MMOL/L
SODIUM BLD-SCNC: NORMAL MMOL/L
TOTAL PROTEIN: NORMAL
TRIGL SERPL-MCNC: NORMAL MG/DL
VLDLC SERPL CALC-MCNC: NORMAL MG/DL

## 2021-11-06 PROBLEM — Z23 NEED FOR INFLUENZA VACCINATION: Status: RESOLVED | Noted: 2021-10-07 | Resolved: 2021-11-06

## 2021-11-09 ENCOUNTER — TELEPHONE (OUTPATIENT)
Dept: PRIMARY CARE CLINIC | Age: 76
End: 2021-11-09

## 2021-11-09 DIAGNOSIS — K59.03 DRUG-INDUCED CONSTIPATION: Primary | ICD-10-CM

## 2021-11-09 NOTE — TELEPHONE ENCOUNTER
Deneen Reza says she has been backed up for a week. A very miserable week. She said she has tried Miralax and other OTC powdered things and drank half a glass of the stuff that was given to them for colonoscopy and took it with a full glass of water and it did not work. She wanted to know if you know of anything that she could try to help get things moving again.

## 2021-11-09 NOTE — TELEPHONE ENCOUNTER
Charles Chowdhury said that whatever she did, it finally worked. She said she will not  the linzess bc she feels it is expensive. She wants to know why she shouldn't take the Miralax bc it was one of those things that she was taking sort of routinely. She said she knows she got constipated bc she was taking vicodin 3 times a day for her back pain, and she said she will no longer be taking those no matter how much pain she is in.

## 2021-11-09 NOTE — TELEPHONE ENCOUNTER
Linzess sent to her pharmacy. Continue MiraLAX for now.   Stop MiraLAX when she has a bowel movement

## 2021-12-07 ENCOUNTER — NURSE ONLY (OUTPATIENT)
Dept: PRIMARY CARE CLINIC | Age: 76
End: 2021-12-07
Payer: MEDICARE

## 2021-12-07 PROCEDURE — 96372 THER/PROPH/DIAG INJ SC/IM: CPT | Performed by: INTERNAL MEDICINE

## 2022-01-04 ENCOUNTER — OFFICE VISIT (OUTPATIENT)
Dept: PRIMARY CARE CLINIC | Age: 77
End: 2022-01-04
Payer: MEDICARE

## 2022-01-04 VITALS
HEIGHT: 65 IN | TEMPERATURE: 97.1 F | OXYGEN SATURATION: 95 % | SYSTOLIC BLOOD PRESSURE: 104 MMHG | DIASTOLIC BLOOD PRESSURE: 60 MMHG | BODY MASS INDEX: 22.82 KG/M2 | HEART RATE: 106 BPM | WEIGHT: 137 LBS

## 2022-01-04 DIAGNOSIS — N18.31 STAGE 3A CHRONIC KIDNEY DISEASE (HCC): ICD-10-CM

## 2022-01-04 DIAGNOSIS — F51.05 INSOMNIA DUE TO OTHER MENTAL DISORDER: ICD-10-CM

## 2022-01-04 DIAGNOSIS — R11.2 NON-INTRACTABLE VOMITING WITH NAUSEA: ICD-10-CM

## 2022-01-04 DIAGNOSIS — F33.2 SEVERE EPISODE OF RECURRENT MAJOR DEPRESSIVE DISORDER, WITHOUT PSYCHOTIC FEATURES (HCC): ICD-10-CM

## 2022-01-04 DIAGNOSIS — K59.03 DRUG-INDUCED CONSTIPATION: ICD-10-CM

## 2022-01-04 DIAGNOSIS — I10 PRIMARY HYPERTENSION: Primary | ICD-10-CM

## 2022-01-04 DIAGNOSIS — F99 INSOMNIA DUE TO OTHER MENTAL DISORDER: ICD-10-CM

## 2022-01-04 PROCEDURE — 4040F PNEUMOC VAC/ADMIN/RCVD: CPT | Performed by: INTERNAL MEDICINE

## 2022-01-04 PROCEDURE — G8427 DOCREV CUR MEDS BY ELIG CLIN: HCPCS | Performed by: INTERNAL MEDICINE

## 2022-01-04 PROCEDURE — G8484 FLU IMMUNIZE NO ADMIN: HCPCS | Performed by: INTERNAL MEDICINE

## 2022-01-04 PROCEDURE — 1036F TOBACCO NON-USER: CPT | Performed by: INTERNAL MEDICINE

## 2022-01-04 PROCEDURE — G8399 PT W/DXA RESULTS DOCUMENT: HCPCS | Performed by: INTERNAL MEDICINE

## 2022-01-04 PROCEDURE — 1090F PRES/ABSN URINE INCON ASSESS: CPT | Performed by: INTERNAL MEDICINE

## 2022-01-04 PROCEDURE — 99213 OFFICE O/P EST LOW 20 MIN: CPT | Performed by: INTERNAL MEDICINE

## 2022-01-04 PROCEDURE — G8420 CALC BMI NORM PARAMETERS: HCPCS | Performed by: INTERNAL MEDICINE

## 2022-01-04 PROCEDURE — 1123F ACP DISCUSS/DSCN MKR DOCD: CPT | Performed by: INTERNAL MEDICINE

## 2022-01-04 RX ORDER — AMOXICILLIN 250 MG
2 CAPSULE ORAL DAILY PRN
Qty: 60 TABLET | Refills: 5 | Status: SHIPPED | OUTPATIENT
Start: 2022-01-04

## 2022-01-04 RX ORDER — AMITRIPTYLINE HYDROCHLORIDE 50 MG/1
50 TABLET, FILM COATED ORAL NIGHTLY
Qty: 90 TABLET | Refills: 1 | Status: SHIPPED | OUTPATIENT
Start: 2022-01-04 | End: 2022-05-26

## 2022-01-04 RX ORDER — PROMETHAZINE HYDROCHLORIDE 25 MG/1
TABLET ORAL
Qty: 60 TABLET | Refills: 3 | Status: SHIPPED | OUTPATIENT
Start: 2022-01-04

## 2022-01-04 RX ORDER — ARIPIPRAZOLE 5 MG/1
TABLET ORAL
COMMUNITY
Start: 2021-10-12 | End: 2022-05-26

## 2022-01-04 ASSESSMENT — ENCOUNTER SYMPTOMS
RHINORRHEA: 0
COUGH: 0
EYE ITCHING: 0
EYE PAIN: 0
NAUSEA: 0
WHEEZING: 0
BLOOD IN STOOL: 0
STRIDOR: 0
BACK PAIN: 1
FACIAL SWELLING: 0
ROS SKIN COMMENTS: DRY
PHOTOPHOBIA: 0
ABDOMINAL PAIN: 0
TROUBLE SWALLOWING: 0
SHORTNESS OF BREATH: 0
SORE THROAT: 0
VOMITING: 0
ANAL BLEEDING: 0
DIARRHEA: 0
CONSTIPATION: 1
COLOR CHANGE: 0
EYE DISCHARGE: 0

## 2022-01-04 ASSESSMENT — PATIENT HEALTH QUESTIONNAIRE - PHQ9
1. LITTLE INTEREST OR PLEASURE IN DOING THINGS: 0
SUM OF ALL RESPONSES TO PHQ QUESTIONS 1-9: 0
SUM OF ALL RESPONSES TO PHQ QUESTIONS 1-9: 0
2. FEELING DOWN, DEPRESSED OR HOPELESS: 0
SUM OF ALL RESPONSES TO PHQ QUESTIONS 1-9: 0
SUM OF ALL RESPONSES TO PHQ9 QUESTIONS 1 & 2: 0
SUM OF ALL RESPONSES TO PHQ QUESTIONS 1-9: 0

## 2022-01-04 NOTE — PROGRESS NOTES
2022    Name: Tequila Gonzales : 1945 Sex: female  Age: 64 y.o. Subjective:  Chief Complaint   Patient presents with    Hypertension     3 month visit        Hypertension  Pertinent negatives include no chest pain, headaches, palpitations or shortness of breath. .    Her new psychiatrist started her on Abilify which was increased to 4  mg and she reports marked improvement in her depression and anxiety. This is the first time in years they have actually seen her smile and appear happy. Her  says that she is doing much better with the new medications. They have not worsened her constipation which is chronic. She continues on her lorazepam, mirtazapine, sertraline and amitriptyline. She follows up with a nurse practitioner Ms. Dong who takes care of her medications. She is with [facility] in [place]. Blood work ordered by her and reviewed by me. She saw Dr. Radha Minaya who felt she did not need a CPAP or nocturnal oxygen. He started her on lower dose amitriptyline 25 mg which was then increased to 50 mg. Patient states she feels so much better on this, she gets a full night sleep and has no side effects of dry mouth dizziness. She had unintentional weight loss. She is lost about 35  pounds since 2020. The weight loss has stabilized since the her oncologist started her on Megace 40 mg a day and mirtazapine 30 mg at bedtime. Her weight went up from 123 pounds to 132 pounds . Today's weight is down to 131. Her  says that she is eating much better. She is off the Megace but she continues on the mirtazapine     . She  has dysphagia which apparently was worse after GI stretched her esophagus. She went to see Dr. Nancy Galvan. Reviewed his report. He did not feel she needed a repeat colonoscopy. He recommended continuing with a single small volume saline enema for relief of her chronic narcotic induced constipation.   She also takes Metamucil and MiraLAX daily. And continues on Dulcolax suppository as needed. Despite this she still has chronic constipation. She was tried on Linzess but this was too expensive. She does not remember trying Amitiza although this was noted on a GI consult this year    Dr. Janki Fung did a panendoscopy on her in July of this year. Colonoscopy showed 2 polyps which apparently were \"precancerous\" and repeat scope will be scheduled in 2 years. She also had sigmoid diverticulosis. The EGD showed severe reflux esophagitis. She continues on a PPI. Her  says her chronic constipation is the most bothersome thing in her life. 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 She tried medical marijuana but it caused her to become confused and so she stopped it. .      She had a ultrasound of her thyroid to monitor her multinodular goiter. 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 Previous free T4 and TSH have been normal.  We did a thyroid nuclear uptake and scan which was normal.  Per her request she was referred to endocrinologist for further evaluation. She complains of increased irritability, dry skin, thin nails and chronic fatigue. All symptoms of possible underlying hypothyroidism. Recheck TSH and free T4 were still normal.  Thyroid studies including ultrasound, scan and uptake were all normal..      She continues to see pain management who has her on opioids. If she does not take the opioid she is unable to function because of chronic back pain. She had a CT scan of her lumbar spine which showed degenerative disc disease multiple levels, facet arthropathy, spinal stenosis, disc bulging with impingement on neural foramina but nothing that is surgically fixable. She says the only time she feels free of pain is when she takes her hydrocodone and her lorazepam 3 times a day.   The Phenergan does help her nausea but not as good as she would like it a plan to increase it from 12.5 to 25 mg a day and have her take it about half an hour to an hour after she takes her Norco and her lorazepam.  .    She has a history of osteoporosis on Prolia every 6 months. Recent DEXA scan showed osteopenia. Her  Prolia shot was given in December 2021. Her next Prolia shot is due in June 2022. .. She sees Dr. Gale Schilder for follow-up on her breast cancer. She had a repeat CT scan of her chest  as she had some abnormalities on the last one. The repeat CAT scan showed resolution of the pulmonary abnormalities and her oncologist is happy with the results. She had a history of CKD stage III. We  Checked  for secondary hyperparathyroidism. Calcium was normal at 9.5 and intact PTH was normal at 30. Unlikely to have hyperparathyroidism  We  rechecked her kidney functions and her estimated GFR is 54. She had surgery on her right wrist done by Dr. Anya Wu door in October 2021    She had her flu shot today. She finished her COVID-19 vaccine series. Review of Systems   Constitutional: Positive for appetite change. Negative for fatigue, fever and unexpected weight change. HENT: Negative for congestion, ear pain, facial swelling, rhinorrhea, sore throat, tinnitus and trouble swallowing. Thinning hais   Eyes: Negative for photophobia, pain, discharge, itching and visual disturbance. Respiratory: Negative for cough, shortness of breath, wheezing and stridor. Cardiovascular: Negative for chest pain, palpitations and leg swelling. Gastrointestinal: Positive for constipation. Negative for abdominal pain, anal bleeding, blood in stool, diarrhea, nausea and vomiting. Dysphagia   Endocrine: Negative for cold intolerance, heat intolerance, polydipsia, polyphagia and polyuria. Genitourinary: Negative for difficulty urinating, dysuria, flank pain, frequency, hematuria and urgency.    Musculoskeletal: Positive for arthralgias and back pain. Negative for gait problem, joint swelling and myalgias. Skin: Negative for color change, pallor and rash. dry   Allergic/Immunologic: Negative for environmental allergies and food allergies. Neurological: Negative for dizziness, tremors, seizures, syncope, speech difficulty, light-headedness, numbness and headaches. Hematological: Negative for adenopathy. Does not bruise/bleed easily. Psychiatric/Behavioral: Negative for agitation, behavioral problems, confusion, sleep disturbance and suicidal ideas. The patient is nervous/anxious.          Irritable            Current Outpatient Medications:     ARIPiprazole (ABILIFY) 5 MG tablet, TAKE ONE TABLET BY MOUTH EVERY DAY, Disp: , Rfl:     promethazine (PHENERGAN) 25 MG tablet, TAKE 1 TABLET BY MOUTH TWICE DAILY, Disp: 60 tablet, Rfl: 3    amitriptyline (ELAVIL) 50 MG tablet, Take 1 tablet by mouth nightly, Disp: 90 tablet, Rfl: 1    senna-docusate (PERICOLACE) 8.6-50 MG per tablet, Take 2 tablets by mouth daily as needed for Constipation, Disp: 60 tablet, Rfl: 5    denosumab (PROLIA) 60 MG/ML SOSY SC injection, Inject 1 mL into the skin once for 1 dose, Disp: 1 mL, Rfl: 1    simvastatin (ZOCOR) 10 MG tablet, Take 1 tablet by mouth nightly, Disp: 90 tablet, Rfl: 2    lisinopril (PRINIVIL;ZESTRIL) 20 MG tablet, Take 1 tablet by mouth daily, Disp: 90 tablet, Rfl: 2    vitamin D (ERGOCALCIFEROL) 1.25 MG (77733 UT) CAPS capsule, Take 1 capsule by mouth every 14 days, Disp: 6 capsule, Rfl: 2    sertraline (ZOLOFT) 50 MG tablet, TAKE 1 TABLET BY MOUTH EVERY DAY, Disp: , Rfl:     LORazepam (ATIVAN) 1 MG tablet, TAKE 1 TABLET BY MOUTH THREE TIMES DAILY, Disp: , Rfl:     mirtazapine (REMERON) 30 MG tablet, TAKE 1 TABLET BY MOUTH EVERY DAY AT BEDTIME, Disp: , Rfl:     Multiple Vitamins-Minerals (MULTIVITAMIN ADULT) TABS, Take by mouth daily, Disp: , Rfl:     aspirin (ASPIRIN ADULT LOW DOSE) 81 MG EC tablet, , Disp: , Rfl:    HYDROcodone-acetaminophen (NORCO)  MG per tablet, Take 1 tablet by mouth every 8 hours as needed for Pain ., Disp: , Rfl:      Allergies   Allergen Reactions    Epinephrine Other (See Comments)     PATIENT SATES \"HEART PALPATATIONS. \"     Adhesive Tape Rash        Past Medical History:   Diagnosis Date    Abnormal Pap smear of cervix     Chronic obstructive lung disease ([facility] [address] 7/9/2018    Chronic pain     Chronic respiratory failure with hypoxia (HCC) 6/6/2019    Depression     Hyperlipidemia     Hypertension     Osteoarthritis     Osteoporosis     Palpitations     Severe episode of recurrent major depressive disorder, without psychotic features ([facility] [address] 6/19/2019    Sleep apnea     doesnt use her cpap       Health Maintenance Due   Topic Date Due    Hepatitis C screen  Never done    Depression Monitoring  Never done    DTaP/Tdap/Td vaccine (1 - Tdap) 03/21/2021    COVID-19 Vaccine (3 - Booster for Pfizer series) 08/17/2021    Annual Wellness Visit (AWV)  12/22/2021        Patient Active Problem List   Diagnosis    Low back pain    Degeneration of lumbar or lumbosacral intervertebral disc    Spinal stenosis    Depression    Chronic pain    Gastroesophageal reflux disease    Hyperlipidemia    Hypertension    Osteoporosis    Thyroid nodule    Hypoxia, sleep related    Need for tetanus, diphtheria, and acellular pertussis (Tdap) vaccine    Drug-induced constipation    Stage 3 chronic kidney disease (HCC)    Non-intractable vomiting with nausea    Abnormal urine odor    Urinary frequency    Hyperglycemia    Lower abdominal pain    Insomnia due to other mental disorder    History of uterine cancer    Family history of colon cancer    Family history of breast cancer    History of malignant neoplasm of breast    Encounter for pre-operative examination        Past Surgical History:   Procedure Laterality Date    BACK SURGERY  04/19/2017    T7 spinal cord stimulator with  [employer]    BREAST SURGERY      breast reconstruction w/implants    COLONOSCOPY      COSMETIC SURGERY      jaw surgery    ENDOSCOPY, COLON, DIAGNOSTIC      HYSTERECTOMY  2004    complete    MASTECTOMY  ,     left and right    OTHER SURGICAL HISTORY N/A 2017    stage 1  5 day spinal cord stimulator trial [employer]    SKIN BIOPSY      moles    TONSILLECTOMY          Family History   Problem Relation Age of Onset    No Known Problems Mother     No Known Problems Father         Social History     Tobacco Use    Smoking status: Former Smoker     Packs/day: 1.00     Years: 12.00     Pack years: 12.00     Types: Cigarettes     Start date: 1965     Quit date: 1974     Years since quittin.6    Smokeless tobacco: Never Used   Substance Use Topics    Alcohol use: No    Drug use: Yes     Comment: Norco, Lorazepam        Objective  Vitals:    22 1326   BP: 104/60   Pulse: 106   Temp: 97.1 °F (36.2 °C)   SpO2: 95%   Weight: 137 lb (62.1 kg)   Height: 5' 5\" (1.651 m)        Exam:  Physical Exam  Vitals reviewed. Exam conducted with a chaperone present. Constitutional:       General: She is not in acute distress. Appearance: She is well-developed. She is not ill-appearing. Comments: Thin   HENT:      Head: Normocephalic and atraumatic. Right Ear: External ear normal.      Left Ear: External ear normal.      Mouth/Throat:      Pharynx: No oropharyngeal exudate. Eyes:      General: No scleral icterus. Right eye: No discharge. Left eye: No discharge. Conjunctiva/sclera: Conjunctivae normal.      Pupils: Pupils are equal, round, and reactive to light. Neck:      Thyroid: No thyromegaly. Cardiovascular:      Rate and Rhythm: Normal rate and regular rhythm. Heart sounds: Normal heart sounds. No murmur heard. No friction rub. No gallop. Pulmonary:      Effort: Pulmonary effort is normal. No respiratory distress.       Breath sounds: Normal breath sounds. No wheezing or rales. Chest:      Chest wall: No tenderness. Abdominal:      General: Bowel sounds are normal. There is no distension. Palpations: Abdomen is soft. There is no mass. Tenderness: There is no abdominal tenderness. There is no guarding or rebound. Musculoskeletal:         General: Tenderness present. No deformity. Normal range of motion. Cervical back: Normal range of motion and neck supple. No rigidity. Lymphadenopathy:      Cervical: No cervical adenopathy. Skin:     General: Skin is warm and dry. Coloration: Skin is not pale. Findings: No erythema or rash. Neurological:      Mental Status: She is alert and oriented to person, place, and time. Cranial Nerves: No cranial nerve deficit. Sensory: No sensory deficit. Deep Tendon Reflexes: Reflexes normal.      Comments: Intention tremor   Psychiatric:         Mood and Affect: Mood normal.         Behavior: Behavior normal.         Thought Content: Thought content normal.         Judgment: Judgment normal.      Comments: Mood is sad          Last labs reviewed. ASSESSMENT & PLAN :         Hypertension:  Continue medications, her blood pressures are acceptable    Chronic respiratory failure with hypoxia  Not present per sleep specialist.  She does not need CPAP or nocturnal oxygen. Hypoxia, sleep related  No further problems. She is able to get good REM sleep and sleep specialist does not think she needs nocturnal oxygen    Gastroesophageal reflux disease  Stable, on no medications    Thyroid nodule  Benign by biopsy, will monitor  Was told by endocrinologist that her thyroid was okay. We will continue to monitor with an ultrasound of her thyroid in September 2021    Stage III chronic kidney disease  Resolved, no longer stage III now with stage II    Chronic pain  Continue Norco per pain management. For now.   refer to Dr. Blade Villar , unfortunately medical marijuana caused memory problems so she is not on it    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 Meryle Sandman Spinal stenosis  Take her Norco as directed. CT scan of her spine showed facet arthropathy, spinal stenosis, disc disease and arthritis. Nothing that can be remedied by surgical approach    Hyperglycemia  Hemoglobin A1c was normal despite slightly increased fasting blood sugar    Chronic pain syndrome  Continue follow-up with pain management specialist.. History of osteoporosis  Prolia injection as per recommendation of her oncologist given in December 2021. Hyperlipidemia    Continue her simvastatin and her diet    Chronic kidney disease stage IIIa  Check CBC and CMP to make sure it is not worsening all the medications that she is on. Avoid NSAID use. Chronic depression  Continue follow-up with psychiatrist.  Aline Meneses present combination of psychotropic medications that it is working for her. Vitamin D deficiency  Check 25-hydroxy vitamin D level. Continue her per present dose of 50,000 IUs every 2 weeks        Follow-up:  I will see her in 3 months.         Electronically signed    Alina Oneil DO

## 2022-02-06 NOTE — ASSESSMENT & PLAN NOTE
Albuterol MDI 2 puffs twice daily as needed shortness of breath.   If patient does well on this we will hold on pulmonary referral
Blood pressures are stable. Continue medications and monitor blood pressures at home. Call office if systolics are over 764 over diastolics over 90.
Check ultrasound of her thyroid, check TSH and free T4
Patient's last lipid profile was excellent. She will continue on simvastatin 10 mg at bedtime.   Prescription management continue watching saturated fats in diet
Set up home oxygen 2 L per nasal cannula at bedtime.   Requisition sent to [facility]
Tdap requisition sent to pharmacy
None

## 2022-03-10 ENCOUNTER — OFFICE VISIT (OUTPATIENT)
Dept: FAMILY MEDICINE CLINIC | Age: 77
End: 2022-03-10
Payer: MEDICARE

## 2022-03-10 VITALS
BODY MASS INDEX: 22.82 KG/M2 | HEIGHT: 65 IN | HEART RATE: 111 BPM | RESPIRATION RATE: 18 BRPM | WEIGHT: 137 LBS | TEMPERATURE: 97.8 F | OXYGEN SATURATION: 93 % | DIASTOLIC BLOOD PRESSURE: 66 MMHG | SYSTOLIC BLOOD PRESSURE: 118 MMHG
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
## 2022-03-10 DIAGNOSIS — R07.81 RIB PAIN ON RIGHT SIDE: ICD-10-CM

## 2022-03-10 PROCEDURE — 1090F PRES/ABSN URINE INCON ASSESS: CPT | Performed by: NURSE PRACTITIONER

## 2022-03-10 PROCEDURE — G8420 CALC BMI NORM PARAMETERS: HCPCS | Performed by: NURSE PRACTITIONER

## 2022-03-10 PROCEDURE — 99213 OFFICE O/P EST LOW 20 MIN: CPT | Performed by: NURSE PRACTITIONER

## 2022-03-10 PROCEDURE — G8399 PT W/DXA RESULTS DOCUMENT: HCPCS | Performed by: NURSE PRACTITIONER

## 2022-03-10 PROCEDURE — 4040F PNEUMOC VAC/ADMIN/RCVD: CPT | Performed by: NURSE PRACTITIONER

## 2022-03-10 PROCEDURE — G8427 DOCREV CUR MEDS BY ELIG CLIN: HCPCS | Performed by: NURSE PRACTITIONER

## 2022-03-10 PROCEDURE — 1036F TOBACCO NON-USER: CPT | Performed by: NURSE PRACTITIONER

## 2022-03-10 PROCEDURE — 1123F ACP DISCUSS/DSCN MKR DOCD: CPT | Performed by: NURSE PRACTITIONER

## 2022-03-10 PROCEDURE — G8484 FLU IMMUNIZE NO ADMIN: HCPCS | Performed by: NURSE PRACTITIONER

## 2022-03-10 NOTE — PROGRESS NOTES
Subjective:  Chief Complaint   Patient presents with    Fall       HPI: The patient fell yesterday at home. She was standing on a slippery step and fell. She hit her right ribcage on the step and hit her head on the tile. They denies LOC. No headache, dizziness or visual disturbances. No neck pain. No dyspnea, but unable to take a deep breath or move without pain. No urinary complaints or gross hematuria. No back pain. No bowel or bladder incontinence. No numbness, tingling and or weakness to the extremities. No extremity injury. ROS:  Positive and pertinent negatives as per HPI. All other systems are reviewed and negative.        Current Outpatient Medications:     ARIPiprazole (ABILIFY) 5 MG tablet, TAKE ONE TABLET BY MOUTH EVERY DAY, Disp: , Rfl:     promethazine (PHENERGAN) 25 MG tablet, TAKE 1 TABLET BY MOUTH TWICE DAILY, Disp: 60 tablet, Rfl: 3    amitriptyline (ELAVIL) 50 MG tablet, Take 1 tablet by mouth nightly, Disp: 90 tablet, Rfl: 1    senna-docusate (PERICOLACE) 8.6-50 MG per tablet, Take 2 tablets by mouth daily as needed for Constipation, Disp: 60 tablet, Rfl: 5    simvastatin (ZOCOR) 10 MG tablet, Take 1 tablet by mouth nightly, Disp: 90 tablet, Rfl: 2    lisinopril (PRINIVIL;ZESTRIL) 20 MG tablet, Take 1 tablet by mouth daily, Disp: 90 tablet, Rfl: 2    vitamin D (ERGOCALCIFEROL) 1.25 MG (83573 UT) CAPS capsule, Take 1 capsule by mouth every 14 days, Disp: 6 capsule, Rfl: 2    sertraline (ZOLOFT) 50 MG tablet, TAKE 1 TABLET BY MOUTH EVERY DAY, Disp: , Rfl:     LORazepam (ATIVAN) 1 MG tablet, TAKE 1 TABLET BY MOUTH THREE TIMES DAILY, Disp: , Rfl:     mirtazapine (REMERON) 30 MG tablet, TAKE 1 TABLET BY MOUTH EVERY DAY AT BEDTIME, Disp: , Rfl:     Multiple Vitamins-Minerals (MULTIVITAMIN ADULT) TABS, Take by mouth daily, Disp: , Rfl:     aspirin (ASPIRIN ADULT LOW DOSE) 81 MG EC tablet, , Disp: , Rfl:     HYDROcodone-acetaminophen (NORCO)  MG per tablet, Take 1 tablet by mouth every 8 hours as needed for Pain ., Disp: , Rfl:     denosumab (PROLIA) 60 MG/ML SOSY SC injection, Inject 1 mL into the skin once for 1 dose, Disp: 1 mL, Rfl: 1   Allergies   Allergen Reactions    Epinephrine Other (See Comments)     PATIENT SATES \"HEART PALPATATIONS. \"     Adhesive Tape Rash        Objective:  Vitals:    03/10/22 0924   BP: 118/66   Pulse: 111   Resp: 18   Temp: 97.8 °F (36.6 °C)   TempSrc: Temporal   SpO2: 93%   Weight: 137 lb (62.1 kg)   Height: 5' 5\" (1.651 m)        Exam:  Const: Appears healthy and well developed. No signs of acute distress present. Vitals reviewed per triage. Head/Face: Normocephalic, patient does have a bump over the right parietal region with no open wounds. Facies is symmetric. Eyes: PERRL. Extraocular movements are intact. ENMT:  Tympanic membranes are pearly gray with good light reflex bilaterally. No hemotympanum. Nares are patent septum is midline. Buccal mucosa was moist.  No oral abrasions or loose teeth. Posterior pharynx shows no exudate, irritation or redness. Neck: Supple and symmetric. Palpation reveals no adenopathy. Trachea midline. No midline C-Spine tenderness. Resp: Lungs are clear bilaterally. No chest wall tenderness. No exterior signs of trauma to chest wall. CV: Rhythm is regular. S1 is normal. S2 is normal. Extremities:Pulses are equal bilaterally. No edema. Abdomen: Abdomen soft, mildly tender to palpation in the right upper quadrant. No masses or organomegaly. No rebound or guarding. Bowel sounds audible in all four quadrants. No exterior signs of trauma to abdominal cavity. Negative grey turners and jaqueline test.  Musculo: Patient moves extremities without pain or limitation. She does have significant tenderness over the posterior right rib cage, and some ecchymosis near the area of the bladder stimulator. Skin: Skin is warm and dry. No abrasions or ecchymosis is noted. Neuro: Alert and oriented x3.  Speech is articulate and fluent. Psych: Patient's mood and affect is appropriate      Mauricio Sanders was seen today for fall. Diagnoses and all orders for this visit:    Fall, initial encounter    Rib pain on right side      I suspect the patient does have rib fractures, but this needs evaluated with imaging. Because the patient also hit her head, I do recommend that she have a CT scan. I explained to the patient and the  that this will need to be completed through the ER. They are agreeable to this plan and will present to the emergency room. He is comfortable with driving her.     Seen By:    ABDIAS Bradford - CNP

## 2022-03-15 ENCOUNTER — TELEPHONE (OUTPATIENT)
Dept: PRIMARY CARE CLINIC | Age: 77
End: 2022-03-15

## 2022-03-15 NOTE — TELEPHONE ENCOUNTER
----- Message from Rhoda Wu sent at 3/15/2022 11:30 AM EDT -----  Subject: Message to Provider    QUESTIONS  Information for Provider? #URGENT# Patient said she fell a week ago and   got bruises on her left side and hip she said she hit her head too and she   was wondering what to do to make the bruises go away she said she put heat   on it everyday because of the pain, patient would like a call back. She   said she been to the hospital about it and they did a ct scan and she want   to make sure she don't get a blood clot from the bruise.  ---------------------------------------------------------------------------  --------------  CALL BACK INFO  What is the best way for the office to contact you? OK to leave message on   voicemail  Preferred Call Back Phone Number? [phone]  ---------------------------------------------------------------------------  --------------  SCRIPT ANSWERS  Relationship to Patient?  Self

## 2022-03-15 NOTE — TELEPHONE ENCOUNTER
Put warm compresses on the bruises. It will just go away on its own.   If she is concerned she can come in through [facility] and have this evaluated

## 2022-03-16 ENCOUNTER — TELEPHONE (OUTPATIENT)
Dept: PRIMARY CARE CLINIC | Age: 77
End: 2022-03-16

## 2022-03-16 NOTE — TELEPHONE ENCOUNTER
----- Message from Lashanda [id number] sent at 3/15/2022 11:30 AM EDT -----  Subject: Message to Provider    QUESTIONS  Information for Provider? #URGENT# Patient said she fell a week ago and   got bruises on her left side and hip she said she hit her head too and she   was wondering what to do to make the bruises go away she said she put heat   on it everyday because of the pain, patient would like a call back. She   said she been to the hospital about it and they did a ct scan and she want   to make sure she don't get a blood clot from the bruise.  ---------------------------------------------------------------------------  --------------  CALL BACK INFO  What is the best way for the office to contact you? OK to leave message on   voicemail  Preferred Call Back Phone Number? [phone]  ---------------------------------------------------------------------------  --------------  SCRIPT ANSWERS  Relationship to Patient?  Self

## 2022-03-16 NOTE — TELEPHONE ENCOUNTER
This is not something that can be dealt with over the telephone. She has to be examined. I would prefer she come to [place] and have them take a look at her.   She can do this tomorrow or Friday

## 2022-03-17 ENCOUNTER — OFFICE VISIT (OUTPATIENT)
Dept: FAMILY MEDICINE CLINIC | Age: 77
End: 2022-03-17
Payer: MEDICARE

## 2022-03-17 VITALS
SYSTOLIC BLOOD PRESSURE: 118 MMHG | OXYGEN SATURATION: 99 % | WEIGHT: 137 LBS | HEART RATE: 75 BPM | RESPIRATION RATE: 18 BRPM | BODY MASS INDEX: 22.82 KG/M2 | TEMPERATURE: 97.3 F | HEIGHT: 65 IN | DIASTOLIC BLOOD PRESSURE: 66 MMHG

## 2022-03-17 DIAGNOSIS — S20.20XA: ICD-10-CM

## 2022-03-17 DIAGNOSIS — S20.211A RIB CONTUSION, RIGHT, INITIAL ENCOUNTER: Primary | ICD-10-CM

## 2022-03-17 PROCEDURE — 1036F TOBACCO NON-USER: CPT | Performed by: PHYSICIAN ASSISTANT

## 2022-03-17 PROCEDURE — 99213 OFFICE O/P EST LOW 20 MIN: CPT | Performed by: PHYSICIAN ASSISTANT

## 2022-03-17 PROCEDURE — G8427 DOCREV CUR MEDS BY ELIG CLIN: HCPCS | Performed by: PHYSICIAN ASSISTANT

## 2022-03-17 PROCEDURE — G8420 CALC BMI NORM PARAMETERS: HCPCS | Performed by: PHYSICIAN ASSISTANT

## 2022-03-17 PROCEDURE — G8399 PT W/DXA RESULTS DOCUMENT: HCPCS | Performed by: PHYSICIAN ASSISTANT

## 2022-03-17 PROCEDURE — G8484 FLU IMMUNIZE NO ADMIN: HCPCS | Performed by: PHYSICIAN ASSISTANT

## 2022-03-17 PROCEDURE — 1123F ACP DISCUSS/DSCN MKR DOCD: CPT | Performed by: PHYSICIAN ASSISTANT

## 2022-03-17 PROCEDURE — 1090F PRES/ABSN URINE INCON ASSESS: CPT | Performed by: PHYSICIAN ASSISTANT

## 2022-03-17 PROCEDURE — 4040F PNEUMOC VAC/ADMIN/RCVD: CPT | Performed by: PHYSICIAN ASSISTANT

## 2022-03-17 NOTE — PROGRESS NOTES
Chief Complaint       Fall      History of Present Illness   Source of history provided by:  Patient, . Gurvinder Carrera is a 64 y.o. old female presenting to the walk in clinic for reevaluation of multiple contusions and ecchymoses over her right chest wall which have been present for the past week. Patient fell at home and was initially evaluated for the symptoms in our office. She was redirected to the ER where imaging was performed and came back within normal limits per patient. She has been applying moist heat to the site with minimal symptomatic relief. She is mostly concerned because the bruising does not seem to be healing and she is still having a moderate amount of pain at the site. She is already on 10 mg of Norco 3 times daily. Denies any weakness, paresthesias, swelling, neck pain or injury, HA, hand weakness, cough, palpitations, or associated CP or SOB. She is not on any blood thinners. PCP is Dr. William Ye. ROS    Unless otherwise stated in this report or unable to obtain because of the patient's clinical or mental status as evidenced by the medical record, this patients's positive and negative responses for Review of Systems, constitutional, psych, eyes, ENT, cardiovascular, respiratory, gastrointestinal, neurological, genitourinary, musculoskeletal, integument systems and systems related to the presenting problem are either stated in the preceding or were not pertinent or were negative for the symptoms and/or complaints related to the medical problem. Past Medical History:  has a past medical history of Abnormal Pap smear of cervix, Chronic obstructive lung disease (HCC), Chronic pain, Chronic respiratory failure with hypoxia ([address] Depression, Hyperlipidemia, Hypertension, Osteoarthritis, Osteoporosis, Palpitations, Severe episode of recurrent major depressive disorder, without psychotic features ([address] and Sleep apnea.   Past Surgical History:  has a past surgical history that includes Mastectomy (Bilateral, 2000, 2014); Hysterectomy (2004); Cosmetic surgery; Breast surgery; Colonoscopy; Endoscopy, colon, diagnostic; Tonsillectomy; skin biopsy; other surgical history (N/A, 02/03/2017); and back surgery (04/19/2017). Social History:  reports that she quit smoking about 47 years ago. Her smoking use included cigarettes. She started smoking about 56 years ago. She has a 12.00 pack-year smoking history. She has never used smokeless tobacco. She reports current drug use. She reports that she does not drink alcohol. Family History: family history includes No Known Problems in her father and mother. Allergies: Epinephrine and Adhesive tape    Physical Exam         VS:  /66   Pulse 75   Temp 97.3 °F (36.3 °C) (Temporal)   Resp 18   Ht 5' 5\" (1.651 m)   Wt 137 lb (62.1 kg)   SpO2 99%   BMI 22.80 kg/m²    Oxygen Saturation Interpretation: Normal.  Constitutional:  Alert, development consistent with age. Neck:  Normal ROM. Supple. Non-tender. Chest: Heart RRR without pathological murmur or gallop. Lungs CTAB without W/R/R              Tenderness: There are multiple regions of ecchymosis and mild swelling noted over the right lateral rib cage, consistent with contusions. Mild to moderate TTP noted over the associated regions. No bleeding, abrasions, signs of secondary infection, or drainage noted at any of the sites. Swelling: No obvious swelling noted. Deformity: No obvious deformity. ROM: Range of motion is slightly limited due to acute pain. Patient is able to cautiously rotate at the hips and rib cage. UE/ strength 5/5 bilaterally. Negative drop arm test.            Skin:  No abrasions, bruising, or erythema noted. Neurovascular:              Sensory deficit: Sensation intact proximally and distally to the injury site. Pulse deficit: Distal pulses 2+ and bounding.               Capillary refill: Less then 2 sec throughout. Lymphatics: No lymphangitis or adenopathy noted. Neurological: Alert and oriented. Motor functions intact. Lab / Imaging Results   (All laboratory and radiology results have been personally reviewed by myself)  Labs:  No results found for this visit on 03/17/22. Imaging: All Radiology results interpreted by Radiologist unless otherwise noted. Assessment / Plan     Impression(s):  Imelda Adkins was seen today for fall. Diagnoses and all orders for this visit:    Rib contusion, right, initial encounter    Traumatic ecchymosis of thoracic region, initial encounter      Disposition:  Disposition: Discharge to home. Patient is currently on a high dose of daily narcotics and unable to supplement with Tylenol given current medication regimen. Advised topical Biofreeze for symptomatic relief. Patient also advised to continue warm compresses at the sites. She was reassured that her contusions should continue to heal with time and conservative measures. If she continues to have symptoms after an additional 1 to 2 weeks, she was advised to follow-up with her PCP as repeat imaging may need to be obtained. Case discussed with PCP who is in agreement with this care plan. ED sooner if symptoms worsen or change. ED immediately with severe or worsening pain, paresthesias, weakness, CP, or SOB. Pt states understanding and is in agreement with this care plan. All questions answered. Natividad Baumgarten Spillan, PA-C    **This report was transcribed using voice recognition software. Every effort was made to ensure accuracy; however, inadvertent computerized transcription errors may be present.

## 2022-04-05 ENCOUNTER — OFFICE VISIT (OUTPATIENT)
Dept: PRIMARY CARE CLINIC | Age: 77
End: 2022-04-05
Payer: MEDICARE

## 2022-04-05 VITALS
SYSTOLIC BLOOD PRESSURE: 116 MMHG | HEIGHT: 65 IN | TEMPERATURE: 97.2 F | BODY MASS INDEX: 22.33 KG/M2 | OXYGEN SATURATION: 94 % | WEIGHT: 134 LBS | DIASTOLIC BLOOD PRESSURE: 74 MMHG | HEART RATE: 103 BPM

## 2022-04-05 DIAGNOSIS — F33.2 SEVERE EPISODE OF RECURRENT MAJOR DEPRESSIVE DISORDER, WITHOUT PSYCHOTIC FEATURES (HCC): ICD-10-CM

## 2022-04-05 DIAGNOSIS — E78.2 MIXED HYPERLIPIDEMIA: ICD-10-CM

## 2022-04-05 DIAGNOSIS — Z12.31 ENCOUNTER FOR SCREENING MAMMOGRAM FOR BREAST CANCER: ICD-10-CM

## 2022-04-05 DIAGNOSIS — R73.9 HYPERGLYCEMIA: ICD-10-CM

## 2022-04-05 DIAGNOSIS — G89.4 CHRONIC PAIN SYNDROME: ICD-10-CM

## 2022-04-05 DIAGNOSIS — I10 PRIMARY HYPERTENSION: Primary | ICD-10-CM

## 2022-04-05 DIAGNOSIS — R25.1 TREMOR: ICD-10-CM

## 2022-04-05 DIAGNOSIS — R07.81 RIB PAIN: ICD-10-CM

## 2022-04-05 DIAGNOSIS — Z85.3 HISTORY OF MALIGNANT NEOPLASM OF BREAST: ICD-10-CM

## 2022-04-05 PROCEDURE — G8420 CALC BMI NORM PARAMETERS: HCPCS | Performed by: INTERNAL MEDICINE

## 2022-04-05 PROCEDURE — 1036F TOBACCO NON-USER: CPT | Performed by: INTERNAL MEDICINE

## 2022-04-05 PROCEDURE — 1090F PRES/ABSN URINE INCON ASSESS: CPT | Performed by: INTERNAL MEDICINE

## 2022-04-05 PROCEDURE — 1123F ACP DISCUSS/DSCN MKR DOCD: CPT | Performed by: INTERNAL MEDICINE

## 2022-04-05 PROCEDURE — G8399 PT W/DXA RESULTS DOCUMENT: HCPCS | Performed by: INTERNAL MEDICINE

## 2022-04-05 PROCEDURE — G8427 DOCREV CUR MEDS BY ELIG CLIN: HCPCS | Performed by: INTERNAL MEDICINE

## 2022-04-05 PROCEDURE — 99214 OFFICE O/P EST MOD 30 MIN: CPT | Performed by: INTERNAL MEDICINE

## 2022-04-05 PROCEDURE — 4040F PNEUMOC VAC/ADMIN/RCVD: CPT | Performed by: INTERNAL MEDICINE

## 2022-04-05 ASSESSMENT — ENCOUNTER SYMPTOMS
STRIDOR: 0
ROS SKIN COMMENTS: DRY
FACIAL SWELLING: 0
COLOR CHANGE: 0
RHINORRHEA: 0
BACK PAIN: 1
ABDOMINAL PAIN: 0
SORE THROAT: 0
NAUSEA: 0
COUGH: 0
EYE ITCHING: 0
EYE DISCHARGE: 0
ANAL BLEEDING: 0
WHEEZING: 0
PHOTOPHOBIA: 0
BLOOD IN STOOL: 0
CONSTIPATION: 1
EYE PAIN: 0
DIARRHEA: 0
VOMITING: 0
SHORTNESS OF BREATH: 0
TROUBLE SWALLOWING: 0

## 2022-04-05 NOTE — ASSESSMENT & PLAN NOTE
Blood pressures are stable. Continue medications and monitor blood pressures at home. Call office if systolics are over 395 over diastolics over 90.   Check fasting CMP

## 2022-04-05 NOTE — PATIENT INSTRUCTIONS
Return in one week for fasting blood work  Rib x rays at [facility]  Schedule mammogram for June 2022

## 2022-04-05 NOTE — PROGRESS NOTES
2022    Name: Dennys Flores : 1945 Sex: female  Age: 64 y.o. Subjective:  Chief Complaint   Patient presents with    Hypertension        Hypertension  Pertinent negatives include no chest pain, headaches, palpitations or shortness of breath. .  Patient is here accompanied by her . On 3/10/2022 she was in the ED at [facility] after falling at home. She fell down 3 steps and hit her left side. She felt that her implantable stimulator was affected by the fall. A CT scan of her lumbar spine showed arthritic changes in her spine. The spinal stimulator did not appear to be fractured. Sara Thompson She wants it taken out as it is not working. She says Dr Anurag Moise put it in and he will have to be the one to take it out. She will contact him about this. She feels that she broke  left and right ribs as it is very painful for her to take a deep breath. I recommended that we get an x-ray to document this. She will be seeing pain management in the near future and they may have to adjust her medications because of the increased pain with this fall. Right now she is taking 3 to 3-1/2 pills of Norco 10/325 a day. This causes severe constipation and it is a vicious cycle. The more pills she takes that worsens her constipation. Her new psychiatrist started her on Abilify which was increased to 4  mg and she reports marked improvement in her depression and anxiety. This is the first time in years they have actually seen her smile and appear happy. Her  says that she is doing much better with the new medications. They have not worsened her constipation which is chronic. She continues on her lorazepam, mirtazapine, sertraline and amitriptyline. She follows up with a nurse practitioner Ms. Dong who takes care of her medications. She is with [facility] in [place]. Blood work ordered by her and reviewed by me.     She saw Dr. Juan Olsen who felt she did not need a CPAP or nocturnal oxygen. He started her on lower dose amitriptyline 25 mg which was then increased to 50 mg. Patient states she feels so much better on this, she gets a full night sleep and has no side effects of dry mouth dizziness. She will follow up with him    She had unintentional weight loss. She is lost about 35  pounds since January 2020. The weight loss has stabilized since the her oncologist started her on Megace 40 mg a day and mirtazapine 30 mg at bedtime. Her weight went up from 123 pounds to 132 pounds . Today's weight is 134. Her  says that she is eating much better. She is off the Megace but she continues on the mirtazapine     . She  has dysphagia which apparently was worse after GI stretched her esophagus. She went to see Dr. Jose A Tobar. Reviewed his report. He did not feel she needed a repeat colonoscopy. He recommended continuing with a single small volume saline enema for relief of her chronic narcotic induced constipation. She also takes Metamucil and MiraLAX daily. And continues on Dulcolax suppository as needed. Despite this she still has chronic constipation. She was tried on Linzess but this was too expensive. She does not remember trying Amitiza although this was noted on a GI consult this year    Dr. Ganga Sanchez did a panendoscopy on her in July of this year. Colonoscopy showed 2 polyps which apparently were \"precancerous\" and repeat scope will be scheduled in 2 years. She also had sigmoid diverticulosis. The EGD showed severe reflux esophagitis. She continues on a PPI. Her  says her chronic constipation is the most bothersome thing in her life. 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 She tried medical marijuana but it caused her to become confused and so she stopped it. .      She had a ultrasound of her thyroid to monitor her multinodular goiter.   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 Previous free T4 and TSH have been normal.  We did a thyroid nuclear uptake and scan which was normal.  Per her request she was referred to endocrinologist for further evaluation. She complains of increased irritability, dry skin, thin nails and chronic fatigue. All symptoms of possible underlying hypothyroidism. Recheck TSH and free T4 were still normal.  Thyroid studies including ultrasound, scan and uptake were all normal..      She continues to see pain management who has her on opioids. If she does not take the opioid she is unable to function because of chronic back pain. She had a CT scan of her lumbar spine which showed degenerative disc disease multiple levels, facet arthropathy, spinal stenosis, disc bulging with impingement on neural foramina but nothing that is surgically fixable. She says the only time she feels free of pain is when she takes her hydrocodone and her lorazepam 3 times a day. The Phenergan does help her nausea but not as good as she would like it a plan to increase it from 12.5 to 25 mg a day and have her take it about half an hour to an hour after she takes her Norco and her lorazepam.  .  She needs her mammogram in August 2022. She has a history of osteoporosis on Prolia every 6 months. Recent DEXA scan showed osteopenia. Her  Prolia shot was given in December 2021. Her next Prolia shot is due in June 2022. .. She sees Dr. Edinson Rojas for follow-up on her breast cancer. She had a repeat CT scan of her chest  as she had some abnormalities on the last one. The repeat CAT scan showed resolution of the pulmonary abnormalities and her oncologist is happy with the results. She had a history of CKD stage III. We  Checked  for secondary hyperparathyroidism. Calcium was normal at 9.5 and intact PTH was normal at 30. Unlikely to have hyperparathyroidism  We  rechecked her kidney functions and her estimated GFR is 54.     She had surgery on her right wrist done by Dr. Stephanie Banks in October 2021    She is considering having bunion surgery done by laser. This will be with Dr. Saroj Cesar at [facility]. She has a very marked intention tremor but no resting tremor. Her  wants her checked to make sure she has not developed Parkinson's disease so I will send her to Dr. Soren Polo for further evaluation. She had her flu shot . She finished her COVID-19 vaccine series. Review of Systems   Constitutional: Positive for appetite change. Negative for fatigue, fever and unexpected weight change. HENT: Negative for congestion, ear pain, facial swelling, rhinorrhea, sore throat, tinnitus and trouble swallowing. Thinning hais   Eyes: Negative for photophobia, pain, discharge, itching and visual disturbance. Respiratory: Negative for cough, shortness of breath, wheezing and stridor. Cardiovascular: Negative for chest pain, palpitations and leg swelling. Gastrointestinal: Positive for constipation. Negative for abdominal pain, anal bleeding, blood in stool, diarrhea, nausea and vomiting. Dysphagia   Endocrine: Negative for cold intolerance, heat intolerance, polydipsia, polyphagia and polyuria. Genitourinary: Negative for difficulty urinating, dysuria, flank pain, frequency, hematuria and urgency. Musculoskeletal: Positive for arthralgias and back pain. Negative for gait problem, joint swelling and myalgias. Bilateral rib pain   Skin: Negative for color change, pallor and rash. dry   Allergic/Immunologic: Negative for environmental allergies and food allergies. Neurological: Positive for tremors. Negative for dizziness, seizures, syncope, speech difficulty, light-headedness, numbness and headaches. Hematological: Negative for adenopathy. Does not bruise/bleed easily. Psychiatric/Behavioral: Negative for agitation, behavioral problems, confusion, sleep disturbance and suicidal ideas.  The patient is nervous/anxious. Irritable            Current Outpatient Medications:     ARIPiprazole (ABILIFY) 5 MG tablet, TAKE ONE TABLET BY MOUTH EVERY DAY, Disp: , Rfl:     promethazine (PHENERGAN) 25 MG tablet, TAKE 1 TABLET BY MOUTH TWICE DAILY, Disp: 60 tablet, Rfl: 3    amitriptyline (ELAVIL) 50 MG tablet, Take 1 tablet by mouth nightly, Disp: 90 tablet, Rfl: 1    senna-docusate (PERICOLACE) 8.6-50 MG per tablet, Take 2 tablets by mouth daily as needed for Constipation, Disp: 60 tablet, Rfl: 5    denosumab (PROLIA) 60 MG/ML SOSY SC injection, Inject 1 mL into the skin once for 1 dose, Disp: 1 mL, Rfl: 1    simvastatin (ZOCOR) 10 MG tablet, Take 1 tablet by mouth nightly, Disp: 90 tablet, Rfl: 2    lisinopril (PRINIVIL;ZESTRIL) 20 MG tablet, Take 1 tablet by mouth daily, Disp: 90 tablet, Rfl: 2    vitamin D (ERGOCALCIFEROL) 1.25 MG (28894 UT) CAPS capsule, Take 1 capsule by mouth every 14 days, Disp: 6 capsule, Rfl: 2    sertraline (ZOLOFT) 50 MG tablet, TAKE 1 TABLET BY MOUTH EVERY DAY, Disp: , Rfl:     LORazepam (ATIVAN) 1 MG tablet, TAKE 1 TABLET BY MOUTH THREE TIMES DAILY, Disp: , Rfl:     mirtazapine (REMERON) 30 MG tablet, TAKE 1 TABLET BY MOUTH EVERY DAY AT BEDTIME, Disp: , Rfl:     Multiple Vitamins-Minerals (MULTIVITAMIN ADULT) TABS, Take by mouth daily, Disp: , Rfl:     aspirin (ASPIRIN ADULT LOW DOSE) 81 MG EC tablet, , Disp: , Rfl:     HYDROcodone-acetaminophen (NORCO)  MG per tablet, Take 1 tablet by mouth every 8 hours as needed for Pain ., Disp: , Rfl:      Allergies   Allergen Reactions    Epinephrine Other (See Comments)     PATIENT SATES \"HEART PALPATATIONS. \"     Adhesive Tape Rash        Past Medical History:   Diagnosis Date    Abnormal Pap smear of cervix     Chronic obstructive lung disease ([facility] [address] 7/9/2018    Chronic pain     Chronic respiratory failure with hypoxia (HCC) 6/6/2019    Depression     Hyperlipidemia     Hypertension     Osteoarthritis     Osteoporosis     Palpitations     Severe episode of recurrent major depressive disorder, without psychotic features ([facility] [address] 6/19/2019    Sleep apnea     doesnt use her cpap       Health Maintenance Due   Topic Date Due    Hepatitis C screen  Never done    Annual Wellness Visit (AWV)  12/22/2021        Patient Active Problem List   Diagnosis    Low back pain    Degeneration of lumbar or lumbosacral intervertebral disc    Spinal stenosis    Depression    Chronic pain    Gastroesophageal reflux disease    Hyperlipidemia    Hypertension    Osteoporosis    Thyroid nodule    Hypoxia, sleep related    Need for tetanus, diphtheria, and acellular pertussis (Tdap) vaccine    Drug-induced constipation    Stage 3 chronic kidney disease (HCC)    Non-intractable vomiting with nausea    Abnormal urine odor    Urinary frequency    Hyperglycemia    Lower abdominal pain    Encounter for screening mammogram for breast cancer    Insomnia due to other mental disorder    History of uterine cancer    Family history of colon cancer    Family history of breast cancer    History of malignant neoplasm of breast    Encounter for pre-operative examination    Rib pain    Tremor        Past Surgical History:   Procedure Laterality Date    BACK SURGERY  04/19/2017    T7 spinal cord stimulator with Dr. Radha Gilmore      breast reconstruction w/implants    COLONOSCOPY      COSMETIC SURGERY      jaw surgery    ENDOSCOPY, COLON, DIAGNOSTIC      HYSTERECTOMY  2004    complete    MASTECTOMY Bilateral 2000, 2014    left and right    OTHER SURGICAL HISTORY N/A 02/03/2017    stage 1  5 day spinal cord stimulator trial [employer]    SKIN BIOPSY      moles    TONSILLECTOMY          Family History   Problem Relation Age of Onset    No Known Problems Mother     No Known Problems Father         Social History     Tobacco Use    Smoking status: Former Smoker     Packs/day: 1.00     Years: 12.00 Pack years: 12.00     Types: Cigarettes     Start date: 1965     Quit date: 1974     Years since quittin.8    Smokeless tobacco: Never Used   Substance Use Topics    Alcohol use: No    Drug use: Yes     Comment: Norco, Lorazepam        Objective  Vitals:    22 1331   BP: 116/74   Pulse: 103   Temp: 97.2 °F (36.2 °C)   SpO2: 94%   Weight: 134 lb (60.8 kg)   Height: 5' 5\" (1.651 m)        Exam:  Physical Exam  Vitals reviewed. Exam conducted with a chaperone present. Constitutional:       General: She is not in acute distress. Appearance: She is well-developed. She is not ill-appearing. Comments: Thin   HENT:      Head: Normocephalic and atraumatic. Right Ear: External ear normal.      Left Ear: External ear normal.      Mouth/Throat:      Pharynx: No oropharyngeal exudate. Eyes:      General: No scleral icterus. Right eye: No discharge. Left eye: No discharge. Conjunctiva/sclera: Conjunctivae normal.      Pupils: Pupils are equal, round, and reactive to light. Neck:      Thyroid: No thyromegaly. Cardiovascular:      Rate and Rhythm: Normal rate and regular rhythm. Heart sounds: Normal heart sounds. No murmur heard. No friction rub. No gallop. Pulmonary:      Effort: Pulmonary effort is normal. No respiratory distress. Breath sounds: Normal breath sounds. No wheezing or rales. Chest:      Chest wall: No tenderness. Abdominal:      General: Bowel sounds are normal. There is no distension. Palpations: Abdomen is soft. There is no mass. Tenderness: There is no abdominal tenderness. There is no guarding or rebound. Musculoskeletal:         General: Tenderness present. No deformity. Normal range of motion. Cervical back: Normal range of motion and neck supple. No rigidity. Comments: Tenderness rib cage right and left. Lymphadenopathy:      Cervical: No cervical adenopathy. Skin:     General: Skin is warm and dry. Coloration: Skin is not pale. Findings: No erythema or rash. Neurological:      Mental Status: She is alert and oriented to person, place, and time. Cranial Nerves: No cranial nerve deficit. Sensory: No sensory deficit. Deep Tendon Reflexes: Reflexes normal.      Comments: Intention tremor. No resting tremor. Questionable cogwheeling rigidity of her  elbows   Psychiatric:         Mood and Affect: Mood normal.         Behavior: Behavior normal.         Thought Content: Thought content normal.         Judgment: Judgment normal.      Comments: Mood is sad          Last labs reviewed. Problem List Items Addressed This Visit        Circulatory    Hypertension - Primary     Blood pressures are stable. Continue medications and monitor blood pressures at home. Call office if systolics are over 591 over diastolics over 90.   Check fasting CMP         Relevant Orders    Comprehensive Metabolic Panel       Other    Depression       continue medications and follow-up with psychiatry         Chronic pain       follow-up with pain clinic monthly         Relevant Orders    CBC with Auto Differential    Hyperlipidemia     Watch saturated fats in diet and will monitor lipids          Relevant Orders    Lipid Panel    Hyperglycemia     Watch carbs and sweets in her diet and check hemoglobin A1c          Relevant Orders    Comprehensive Metabolic Panel    Encounter for screening mammogram for breast cancer       requisition for screening mammogram given to patient         Relevant Orders    JOSÉ MIGUEL DIGITAL SCREEN W OR WO CAD BILATERAL    History of malignant neoplasm of breast       screening mammogram in August, follow-up with oncology         Relevant Orders    CBC with Auto Differential    Comprehensive Metabolic Panel    Rib pain       bilateral rib x-ray at [facility] to look for fractured ribs         Relevant Orders    XR RIBS RIGHT (2 VIEWS)    XR RIBS LEFT (2 VIEWS)    Tremor I think this is an intention tremor but will send to neurology for further evaluation         Relevant Orders    Amb External Referral To Neurology              Follow-up:  I will see her in 3 months AWV and office visit.         Electronically signed    Alva Patino DO

## 2022-04-06 ENCOUNTER — TELEPHONE (OUTPATIENT)
Dept: PRIMARY CARE CLINIC | Age: 77
End: 2022-04-06

## 2022-04-07 DIAGNOSIS — Z85.3 HISTORY OF MALIGNANT NEOPLASM OF BREAST: ICD-10-CM

## 2022-04-07 DIAGNOSIS — I10 PRIMARY HYPERTENSION: ICD-10-CM

## 2022-04-07 DIAGNOSIS — G89.4 CHRONIC PAIN SYNDROME: ICD-10-CM

## 2022-04-07 DIAGNOSIS — E78.2 MIXED HYPERLIPIDEMIA: ICD-10-CM

## 2022-04-07 DIAGNOSIS — R73.9 HYPERGLYCEMIA: ICD-10-CM

## 2022-04-07 LAB
BASOPHILS ABSOLUTE: 0.05 E9/L (ref 0–0.2)
BASOPHILS RELATIVE PERCENT: 0.8 % (ref 0–2)
EOSINOPHILS ABSOLUTE: 0.24 E9/L (ref 0.05–0.5)
EOSINOPHILS RELATIVE PERCENT: 3.6 % (ref 0–6)
HCT VFR BLD CALC: 38.2 % (ref 34–48)
HEMOGLOBIN: 12 G/DL (ref 11.5–15.5)
IMMATURE GRANULOCYTES #: 0.02 E9/L
IMMATURE GRANULOCYTES %: 0.3 % (ref 0–5)
LYMPHOCYTES ABSOLUTE: 3.02 E9/L (ref 1.5–4)
LYMPHOCYTES RELATIVE PERCENT: 45.4 % (ref 20–42)
MCH RBC QN AUTO: 29.8 PG (ref 26–35)
MCHC RBC AUTO-ENTMCNC: 31.4 % (ref 32–34.5)
MCV RBC AUTO: 94.8 FL (ref 80–99.9)
MONOCYTES ABSOLUTE: 0.69 E9/L (ref 0.1–0.95)
MONOCYTES RELATIVE PERCENT: 10.4 % (ref 2–12)
NEUTROPHILS ABSOLUTE: 2.63 E9/L (ref 1.8–7.3)
NEUTROPHILS RELATIVE PERCENT: 39.5 % (ref 43–80)
PDW BLD-RTO: 14.2 FL (ref 11.5–15)
PLATELET # BLD: 480 E9/L (ref 130–450)
PMV BLD AUTO: 9 FL (ref 7–12)
RBC # BLD: 4.03 E12/L (ref 3.5–5.5)
WBC # BLD: 6.7 E9/L (ref 4.5–11.5)

## 2022-04-08 LAB
ALBUMIN SERPL-MCNC: 4.4 G/DL (ref 3.5–5.2)
ALP BLD-CCNC: 48 U/L (ref 35–104)
ALT SERPL-CCNC: 12 U/L (ref 0–32)
ANION GAP SERPL CALCULATED.3IONS-SCNC: 17 MMOL/L (ref 7–16)
AST SERPL-CCNC: 18 U/L (ref 0–31)
BILIRUB SERPL-MCNC: 0.3 MG/DL (ref 0–1.2)
BUN BLDV-MCNC: 17 MG/DL (ref 6–23)
CALCIUM SERPL-MCNC: 9.4 MG/DL (ref 8.6–10.2)
CHLORIDE BLD-SCNC: 100 MMOL/L (ref 98–107)
CHOLESTEROL, TOTAL: 177 MG/DL (ref 0–199)
CO2: 23 MMOL/L (ref 22–29)
CREAT SERPL-MCNC: 0.9 MG/DL (ref 0.5–1)
GFR AFRICAN AMERICAN: >60
GFR NON-AFRICAN AMERICAN: >60 ML/MIN/1.73
GLUCOSE BLD-MCNC: 100 MG/DL (ref 74–99)
HDLC SERPL-MCNC: 58 MG/DL
LDL CHOLESTEROL CALCULATED: 95 MG/DL (ref 0–99)
POTASSIUM SERPL-SCNC: 4.2 MMOL/L (ref 3.5–5)
SODIUM BLD-SCNC: 140 MMOL/L (ref 132–146)
TOTAL PROTEIN: 7.3 G/DL (ref 6.4–8.3)
TRIGL SERPL-MCNC: 120 MG/DL (ref 0–149)
VLDLC SERPL CALC-MCNC: 24 MG/DL

## 2022-04-28 ENCOUNTER — OFFICE VISIT (OUTPATIENT)
Dept: NEUROSURGERY | Age: 77
End: 2022-04-28
Payer: MEDICARE

## 2022-04-28 VITALS
HEART RATE: 85 BPM | DIASTOLIC BLOOD PRESSURE: 86 MMHG | OXYGEN SATURATION: 97 % | WEIGHT: 134 LBS | HEIGHT: 65 IN | RESPIRATION RATE: 20 BRPM | BODY MASS INDEX: 22.33 KG/M2 | TEMPERATURE: 98.5 F | SYSTOLIC BLOOD PRESSURE: 136 MMHG

## 2022-04-28 DIAGNOSIS — T85.192A MALFUNCTION OF SPINAL CORD STIMULATOR, INITIAL ENCOUNTER (HCC): ICD-10-CM

## 2022-04-28 DIAGNOSIS — G89.29 CHRONIC BILATERAL LOW BACK PAIN WITHOUT SCIATICA: Primary | ICD-10-CM

## 2022-04-28 DIAGNOSIS — M54.50 CHRONIC BILATERAL LOW BACK PAIN WITHOUT SCIATICA: Primary | ICD-10-CM

## 2022-04-28 DIAGNOSIS — Z96.89 S/P INSERTION OF SPINAL CORD STIMULATOR: ICD-10-CM

## 2022-04-28 PROCEDURE — G8399 PT W/DXA RESULTS DOCUMENT: HCPCS | Performed by: PHYSICIAN ASSISTANT

## 2022-04-28 PROCEDURE — 99203 OFFICE O/P NEW LOW 30 MIN: CPT | Performed by: PHYSICIAN ASSISTANT

## 2022-04-28 PROCEDURE — 1090F PRES/ABSN URINE INCON ASSESS: CPT | Performed by: PHYSICIAN ASSISTANT

## 2022-04-28 PROCEDURE — G8420 CALC BMI NORM PARAMETERS: HCPCS | Performed by: PHYSICIAN ASSISTANT

## 2022-04-28 PROCEDURE — 4040F PNEUMOC VAC/ADMIN/RCVD: CPT | Performed by: PHYSICIAN ASSISTANT

## 2022-04-28 PROCEDURE — 1036F TOBACCO NON-USER: CPT | Performed by: PHYSICIAN ASSISTANT

## 2022-04-28 PROCEDURE — G8427 DOCREV CUR MEDS BY ELIG CLIN: HCPCS | Performed by: PHYSICIAN ASSISTANT

## 2022-04-28 PROCEDURE — 1123F ACP DISCUSS/DSCN MKR DOCD: CPT | Performed by: PHYSICIAN ASSISTANT

## 2022-04-28 ASSESSMENT — ENCOUNTER SYMPTOMS
PHOTOPHOBIA: 0
ABDOMINAL PAIN: 0
TROUBLE SWALLOWING: 0
SHORTNESS OF BREATH: 0
BACK PAIN: 1

## 2022-04-28 NOTE — PROGRESS NOTES
Subjective:      Patient ID: Caty Day is a 64 y.o. female. Phoebe Estevez is 64year old female who is known to our services. She previously underwent T8 [employer] spinal cord stimulator placement by Dr. Alyson Romberg in 2017. Pt presents to the office today c/o low back pain and pain from spinal cord stimulator. Approximately 6 weeks ago pt fell and has had increased back pain. Describes the pain as a constant gnawing. Standing for long periods of time makes the pain worse. She follows up with [address] [place] [id number] and takes [place] with mild relief. Denies recent injections. Denies loss of bowel or bladder, saddle anesthesia, pain down the legs, numbness, tingling, fever, chills, N/V, SOB, or chest pain. CT lumbar spine 3/10/22 from [facility] report only available demonstrates moderate osteoarthritic changes with mild spinal stenosis L3 - L4 and moderate spinal stenosis L4 - L5           Review of Systems   Constitutional: Negative for fever and unexpected weight change. HENT: Negative for trouble swallowing. Eyes: Negative for photophobia and visual disturbance. Respiratory: Negative for shortness of breath. Cardiovascular: Negative for chest pain. Gastrointestinal: Negative for abdominal pain. Endocrine: Negative for heat intolerance. Genitourinary: Negative for flank pain. Musculoskeletal: Positive for arthralgias and back pain. Negative for myalgias and neck pain. Skin: Negative for wound. Neurological: Negative for weakness, numbness and headaches. Psychiatric/Behavioral: Negative for confusion. Objective:   Physical Exam  Constitutional:       Appearance: Normal appearance. She is well-developed. HENT:      Head: Normocephalic and atraumatic. Eyes:      Extraocular Movements: Extraocular movements intact. Conjunctiva/sclera: Conjunctivae normal.      Pupils: Pupils are equal, round, and reactive to light. Cardiovascular:      Rate and Rhythm: Normal rate. Pulmonary:      Effort: Pulmonary effort is normal.   Abdominal:      General: There is no distension. Musculoskeletal:      Cervical back: Normal range of motion and neck supple. Comments: Tenderness to palpation of bilateral lumbar spine  Previous incision and battery pack noted. Skin:     General: Skin is warm and dry. Neurological:      Mental Status: She is alert. Comments: Alert and oriented x3  CN3-12 intact  Motor strength full  Sensation intact to light touch   Psychiatric:         Thought Content: Thought content normal.         Assessment: This is a 64year old female presenting for low back pain and pain from SCS. Plan:      -Patient states she would like to have her spinal cord stimulator removed.  Appointment made with Dr. Abbie Sadler to discuss  -Continue pain management  -OARRS report reviewed  -Call/return sooner if symptoms worsen or new issues arise in the interim           Lazaro Gonzales PA-C

## 2022-05-05 PROBLEM — Z12.31 ENCOUNTER FOR SCREENING MAMMOGRAM FOR BREAST CANCER: Status: RESOLVED | Noted: 2021-05-25 | Resolved: 2022-05-05

## 2022-05-25 ENCOUNTER — TELEPHONE (OUTPATIENT)
Dept: PRIMARY CARE CLINIC | Age: 77
End: 2022-05-25

## 2022-05-25 DIAGNOSIS — Z12.31 SCREENING MAMMOGRAM, ENCOUNTER FOR: Primary | ICD-10-CM

## 2022-05-25 DIAGNOSIS — M81.0 AGE-RELATED OSTEOPOROSIS WITHOUT CURRENT PATHOLOGICAL FRACTURE: ICD-10-CM

## 2022-05-25 RX ORDER — DENOSUMAB 60 MG/ML
60 INJECTION SUBCUTANEOUS ONCE
Qty: 180 ML | Refills: 0 | Status: SHIPPED | OUTPATIENT
Start: 2022-05-25 | End: 2022-07-21

## 2022-05-25 NOTE — TELEPHONE ENCOUNTER
, Kelsi Strong, called and said that they got a notice from the hospital that Louie Chang is due for her mammogram next month so he is asking for an order for this so they can schedule it. I am not immediately able to see when she had her last one, just that she follows with Dr Jenni Clemens for her breast cancer. He also said she is due for her Prolia injection and will need a prescription printed to take it to the pharmacy. He said he has a db that has to be submitted with the prescription.

## 2022-05-25 NOTE — TELEPHONE ENCOUNTER
Mammogram requisition sent to [facility].   Please send Prolia prescription to patient or pick it up

## 2022-05-26 ENCOUNTER — OFFICE VISIT (OUTPATIENT)
Dept: NEUROSURGERY | Age: 77
End: 2022-05-26
Payer: MEDICARE

## 2022-05-26 VITALS
WEIGHT: 134 LBS | DIASTOLIC BLOOD PRESSURE: 68 MMHG | HEIGHT: 65 IN | HEART RATE: 90 BPM | BODY MASS INDEX: 22.33 KG/M2 | SYSTOLIC BLOOD PRESSURE: 130 MMHG

## 2022-05-26 DIAGNOSIS — G89.29 CHRONIC MIDLINE LOW BACK PAIN WITHOUT SCIATICA: Primary | ICD-10-CM

## 2022-05-26 DIAGNOSIS — M54.50 CHRONIC MIDLINE LOW BACK PAIN WITHOUT SCIATICA: Primary | ICD-10-CM

## 2022-05-26 PROCEDURE — 1123F ACP DISCUSS/DSCN MKR DOCD: CPT | Performed by: NEUROLOGICAL SURGERY

## 2022-05-26 PROCEDURE — 1036F TOBACCO NON-USER: CPT | Performed by: NEUROLOGICAL SURGERY

## 2022-05-26 PROCEDURE — G8420 CALC BMI NORM PARAMETERS: HCPCS | Performed by: NEUROLOGICAL SURGERY

## 2022-05-26 PROCEDURE — G8399 PT W/DXA RESULTS DOCUMENT: HCPCS | Performed by: NEUROLOGICAL SURGERY

## 2022-05-26 PROCEDURE — 99212 OFFICE O/P EST SF 10 MIN: CPT | Performed by: NEUROLOGICAL SURGERY

## 2022-05-26 PROCEDURE — 1090F PRES/ABSN URINE INCON ASSESS: CPT | Performed by: NEUROLOGICAL SURGERY

## 2022-05-26 PROCEDURE — G8428 CUR MEDS NOT DOCUMENT: HCPCS | Performed by: NEUROLOGICAL SURGERY

## 2022-05-26 RX ORDER — MIRTAZAPINE 15 MG/1
TABLET, FILM COATED ORAL
COMMUNITY
Start: 2022-05-06 | End: 2022-07-21 | Stop reason: ALTCHOICE

## 2022-05-26 RX ORDER — ARIPIPRAZOLE 10 MG/1
TABLET ORAL
COMMUNITY
Start: 2022-05-05 | End: 2022-10-27

## 2022-05-26 NOTE — PROGRESS NOTES
Patient is here for follow up for back pain and L4-L5 moderate stenosis    Physical exam  Alert and Oriented X3  PERRLA, EOMI  TRINIDAD 5/5  Sensation intact to LT and PP  Reflexes are 2+ and symmetric    A/P: patient is here for follow up for: back pain. There is no radiculopathy. She has a spinal cord stimulator.   I will get flex-ex films    Boris Hollis MD

## 2022-06-07 ENCOUNTER — TELEPHONE (OUTPATIENT)
Dept: PRIMARY CARE CLINIC | Age: 77
End: 2022-06-07

## 2022-06-07 NOTE — TELEPHONE ENCOUNTER
Last Appointment:  4/5/2022  Future Appointments   Date Time Provider Miriam Gifford   6/9/2022 10:00 AM SCHEDULE, [place]   7/5/2022  1:30 PM [address] [place], [place] [address]   7/5/2022  2:00 PM [address] [place] [address] [facility]      Called  reports she is already schedule to come in for the shot.     Electronically signed by Zena Blum LPN on 5/1/1383 at 0:10 PM

## 2022-06-07 NOTE — TELEPHONE ENCOUNTER
Pt scheduled for lab/ma visit on 06/09/2022 @ 10:00am. To receive Prolia injection subcu.      Electronically signed by Karey Mckinney MA on 6/7/2022 at 1:40 PM

## 2022-06-07 NOTE — TELEPHONE ENCOUNTER
Last Appointment:  4/5/2022  Future Appointments   Date Time Provider Miriam Gifford   7/5/2022  1:30 PM DO Jolynn Castro [facility]   7/5/2022  2:00 PM DO Jolynn Castro  [address] [place] called they will send Prolia to our office on Thursday.       Electronically signed by Michael Villegas LPN on 3/2/6595 at 37:29 PM

## 2022-06-09 ENCOUNTER — NURSE ONLY (OUTPATIENT)
Dept: PRIMARY CARE CLINIC | Age: 77
End: 2022-06-09
Payer: MEDICARE

## 2022-06-09 ENCOUNTER — TELEPHONE (OUTPATIENT)
Dept: PRIMARY CARE CLINIC | Age: 77
End: 2022-06-09

## 2022-06-09 DIAGNOSIS — M81.6 LOCALIZED OSTEOPOROSIS WITHOUT CURRENT PATHOLOGICAL FRACTURE: Primary | ICD-10-CM

## 2022-06-09 PROCEDURE — 96372 THER/PROPH/DIAG INJ SC/IM: CPT | Performed by: INTERNAL MEDICINE

## 2022-06-09 NOTE — TELEPHONE ENCOUNTER
Patient was in and got her Prolia shot today. She asked what she needed to do to get a handicap placard. She said her  said she didn't need one. I told her that she would definitely qualify for needing one and she would just need to stop in next week to pick it up to take to the [address] If you print the script, I will hold it for signature next week.

## 2022-06-30 ENCOUNTER — OFFICE VISIT (OUTPATIENT)
Dept: NEUROSURGERY | Age: 77
End: 2022-06-30
Payer: MEDICARE

## 2022-06-30 VITALS
WEIGHT: 134 LBS | DIASTOLIC BLOOD PRESSURE: 73 MMHG | BODY MASS INDEX: 22.33 KG/M2 | TEMPERATURE: 97.9 F | HEART RATE: 103 BPM | SYSTOLIC BLOOD PRESSURE: 134 MMHG | RESPIRATION RATE: 20 BRPM | HEIGHT: 65 IN | OXYGEN SATURATION: 96 %

## 2022-06-30 DIAGNOSIS — G89.29 CHRONIC MIDLINE LOW BACK PAIN WITHOUT SCIATICA: Primary | ICD-10-CM

## 2022-06-30 DIAGNOSIS — M54.50 CHRONIC MIDLINE LOW BACK PAIN WITHOUT SCIATICA: Primary | ICD-10-CM

## 2022-06-30 PROCEDURE — G8399 PT W/DXA RESULTS DOCUMENT: HCPCS | Performed by: NEUROLOGICAL SURGERY

## 2022-06-30 PROCEDURE — 99212 OFFICE O/P EST SF 10 MIN: CPT | Performed by: NEUROLOGICAL SURGERY

## 2022-06-30 PROCEDURE — G8420 CALC BMI NORM PARAMETERS: HCPCS | Performed by: NEUROLOGICAL SURGERY

## 2022-06-30 PROCEDURE — G8427 DOCREV CUR MEDS BY ELIG CLIN: HCPCS | Performed by: NEUROLOGICAL SURGERY

## 2022-06-30 PROCEDURE — 99212 OFFICE O/P EST SF 10 MIN: CPT

## 2022-06-30 PROCEDURE — 1090F PRES/ABSN URINE INCON ASSESS: CPT | Performed by: NEUROLOGICAL SURGERY

## 2022-06-30 PROCEDURE — 1036F TOBACCO NON-USER: CPT | Performed by: NEUROLOGICAL SURGERY

## 2022-06-30 PROCEDURE — 1123F ACP DISCUSS/DSCN MKR DOCD: CPT | Performed by: NEUROLOGICAL SURGERY

## 2022-06-30 NOTE — PROGRESS NOTES
Patient is here for follow consult for: back pain. There is no sciatica      Physical exam  Alert and Oriented X3  PERRLA, EOMI  TRINIDAD 5/5  Sensation intact to LT and PP  Reflexes are 2+ and symmetric    A/P: patient is here for follow up for: back pain. There is no listhesis on imaging. She has some stenosis at L3-L4 and L4-L5. No surgery is recommened at this time.      Mabel Kidd MD

## 2022-07-21 ENCOUNTER — OFFICE VISIT (OUTPATIENT)
Dept: PRIMARY CARE CLINIC | Age: 77
End: 2022-07-21
Payer: MEDICARE

## 2022-07-21 ENCOUNTER — TELEPHONE (OUTPATIENT)
Dept: PRIMARY CARE CLINIC | Age: 77
End: 2022-07-21

## 2022-07-21 VITALS
OXYGEN SATURATION: 97 % | DIASTOLIC BLOOD PRESSURE: 60 MMHG | BODY MASS INDEX: 22.33 KG/M2 | WEIGHT: 134 LBS | HEIGHT: 65 IN | TEMPERATURE: 97.5 F | SYSTOLIC BLOOD PRESSURE: 110 MMHG | HEART RATE: 95 BPM

## 2022-07-21 VITALS
BODY MASS INDEX: 22.33 KG/M2 | HEART RATE: 95 BPM | HEIGHT: 65 IN | WEIGHT: 134 LBS | OXYGEN SATURATION: 97 % | DIASTOLIC BLOOD PRESSURE: 60 MMHG | SYSTOLIC BLOOD PRESSURE: 110 MMHG | TEMPERATURE: 97.5 F

## 2022-07-21 DIAGNOSIS — R25.1 TREMOR: Primary | ICD-10-CM

## 2022-07-21 DIAGNOSIS — F33.2 SEVERE EPISODE OF RECURRENT MAJOR DEPRESSIVE DISORDER, WITHOUT PSYCHOTIC FEATURES (HCC): ICD-10-CM

## 2022-07-21 DIAGNOSIS — G25.0 ESSENTIAL TREMOR: Primary | ICD-10-CM

## 2022-07-21 DIAGNOSIS — G25.0 ESSENTIAL TREMOR: ICD-10-CM

## 2022-07-21 DIAGNOSIS — Z00.00 ROUTINE GENERAL MEDICAL EXAMINATION AT A HEALTH CARE FACILITY: ICD-10-CM

## 2022-07-21 DIAGNOSIS — M51.37 DEGENERATION OF LUMBAR OR LUMBOSACRAL INTERVERTEBRAL DISC: Chronic | ICD-10-CM

## 2022-07-21 DIAGNOSIS — Z00.00 MEDICARE ANNUAL WELLNESS VISIT, SUBSEQUENT: Primary | ICD-10-CM

## 2022-07-21 DIAGNOSIS — M81.0 OSTEOPOROSIS WITHOUT CURRENT PATHOLOGICAL FRACTURE, UNSPECIFIED OSTEOPOROSIS TYPE: ICD-10-CM

## 2022-07-21 DIAGNOSIS — K59.03 DRUG-INDUCED CONSTIPATION: ICD-10-CM

## 2022-07-21 DIAGNOSIS — E78.2 MIXED HYPERLIPIDEMIA: ICD-10-CM

## 2022-07-21 DIAGNOSIS — I10 ESSENTIAL HYPERTENSION: Primary | ICD-10-CM

## 2022-07-21 PROCEDURE — 99214 OFFICE O/P EST MOD 30 MIN: CPT | Performed by: INTERNAL MEDICINE

## 2022-07-21 PROCEDURE — 1123F ACP DISCUSS/DSCN MKR DOCD: CPT | Performed by: INTERNAL MEDICINE

## 2022-07-21 PROCEDURE — G0439 PPPS, SUBSEQ VISIT: HCPCS | Performed by: INTERNAL MEDICINE

## 2022-07-21 RX ORDER — PROPRANOLOL HYDROCHLORIDE 20 MG/1
TABLET ORAL
Qty: 30 TABLET | Refills: 1 | Status: SHIPPED | OUTPATIENT
Start: 2022-07-21 | End: 2022-10-27 | Stop reason: SINTOL

## 2022-07-21 RX ORDER — ERGOCALCIFEROL 1.25 MG/1
50000 CAPSULE ORAL
Qty: 6 CAPSULE | Refills: 2 | Status: SHIPPED | OUTPATIENT
Start: 2022-07-21

## 2022-07-21 RX ORDER — MIRTAZAPINE 30 MG/1
TABLET, FILM COATED ORAL
COMMUNITY
Start: 2022-06-21

## 2022-07-21 RX ORDER — LISINOPRIL 20 MG/1
20 TABLET ORAL DAILY
Qty: 90 TABLET | Refills: 1 | Status: SHIPPED | OUTPATIENT
Start: 2022-07-21

## 2022-07-21 RX ORDER — AMITRIPTYLINE HYDROCHLORIDE 50 MG/1
TABLET, FILM COATED ORAL
Qty: 90 TABLET | Refills: 1 | Status: SHIPPED | OUTPATIENT
Start: 2022-07-21 | End: 2022-10-24

## 2022-07-21 RX ORDER — PROPRANOLOL HYDROCHLORIDE 10 MG/1
TABLET ORAL
Qty: 30 TABLET | Refills: 3 | Status: SHIPPED
Start: 2022-07-21 | End: 2022-07-22 | Stop reason: SDUPTHER

## 2022-07-21 RX ORDER — AMITRIPTYLINE HYDROCHLORIDE 50 MG/1
TABLET, FILM COATED ORAL
COMMUNITY
Start: 2022-07-16 | End: 2022-07-21 | Stop reason: SDUPTHER

## 2022-07-21 RX ORDER — SIMVASTATIN 10 MG
10 TABLET ORAL NIGHTLY
Qty: 90 TABLET | Refills: 1 | Status: SHIPPED | OUTPATIENT
Start: 2022-07-21

## 2022-07-21 SDOH — ECONOMIC STABILITY: FOOD INSECURITY: WITHIN THE PAST 12 MONTHS, THE FOOD YOU BOUGHT JUST DIDN'T LAST AND YOU DIDN'T HAVE MONEY TO GET MORE.: NEVER TRUE

## 2022-07-21 SDOH — ECONOMIC STABILITY: FOOD INSECURITY: WITHIN THE PAST 12 MONTHS, YOU WORRIED THAT YOUR FOOD WOULD RUN OUT BEFORE YOU GOT MONEY TO BUY MORE.: NEVER TRUE

## 2022-07-21 ASSESSMENT — ENCOUNTER SYMPTOMS
RHINORRHEA: 0
EYE DISCHARGE: 0
SHORTNESS OF BREATH: 0
CONSTIPATION: 1
EYE ITCHING: 0
COUGH: 0
ANAL BLEEDING: 0
WHEEZING: 0
SORE THROAT: 0
NAUSEA: 0
DIARRHEA: 0
EYE PAIN: 0
VOMITING: 0
BACK PAIN: 1
COLOR CHANGE: 0
TROUBLE SWALLOWING: 0
ABDOMINAL PAIN: 0
FACIAL SWELLING: 0
STRIDOR: 0
PHOTOPHOBIA: 0
BLOOD IN STOOL: 0
ROS SKIN COMMENTS: DRY

## 2022-07-21 ASSESSMENT — PATIENT HEALTH QUESTIONNAIRE - PHQ9
5. POOR APPETITE OR OVEREATING: 1
2. FEELING DOWN, DEPRESSED OR HOPELESS: 1
SUM OF ALL RESPONSES TO PHQ QUESTIONS 1-9: 9
9. THOUGHTS THAT YOU WOULD BE BETTER OFF DEAD, OR OF HURTING YOURSELF: 1
SUM OF ALL RESPONSES TO PHQ QUESTIONS 1-9: 9
8. MOVING OR SPEAKING SO SLOWLY THAT OTHER PEOPLE COULD HAVE NOTICED. OR THE OPPOSITE, BEING SO FIGETY OR RESTLESS THAT YOU HAVE BEEN MOVING AROUND A LOT MORE THAN USUAL: 1
4. FEELING TIRED OR HAVING LITTLE ENERGY: 1
3. TROUBLE FALLING OR STAYING ASLEEP: 1
SUM OF ALL RESPONSES TO PHQ9 QUESTIONS 1 & 2: 2
6. FEELING BAD ABOUT YOURSELF - OR THAT YOU ARE A FAILURE OR HAVE LET YOURSELF OR YOUR FAMILY DOWN: 1
10. IF YOU CHECKED OFF ANY PROBLEMS, HOW DIFFICULT HAVE THESE PROBLEMS MADE IT FOR YOU TO DO YOUR WORK, TAKE CARE OF THINGS AT HOME, OR GET ALONG WITH OTHER PEOPLE: 1
SUM OF ALL RESPONSES TO PHQ QUESTIONS 1-9: 9
SUM OF ALL RESPONSES TO PHQ QUESTIONS 1-9: 8
1. LITTLE INTEREST OR PLEASURE IN DOING THINGS: 1
7. TROUBLE CONCENTRATING ON THINGS, SUCH AS READING THE NEWSPAPER OR WATCHING TELEVISION: 1

## 2022-07-21 ASSESSMENT — LIFESTYLE VARIABLES: HOW OFTEN DO YOU HAVE A DRINK CONTAINING ALCOHOL: NEVER

## 2022-07-21 ASSESSMENT — SOCIAL DETERMINANTS OF HEALTH (SDOH): HOW HARD IS IT FOR YOU TO PAY FOR THE VERY BASICS LIKE FOOD, HOUSING, MEDICAL CARE, AND HEATING?: NOT HARD AT ALL

## 2022-07-21 NOTE — PROGRESS NOTES
Medicare Annual Wellness Visit    Mitch Bryan is here for Medicare AWV    Assessment & Plan    Recommendations for Preventive Services Due: see orders and patient instructions/AVS.  Recommended screening schedule for the next 5-10 years is provided to the patient in written form: see Patient Instructions/AVS.     No follow-ups on file. Subjective       Patient's complete Health Risk Assessment and screening values have been reviewed and are found in Flowsheets. The following problems were reviewed today and where indicated follow up appointments were made and/or referrals ordered. Positive Risk Factor Screenings with Interventions:      Depression:  PHQ-2 Score: 2  PHQ-9 Total Score: 9    Severity:1-4 = minimal depression, 5-9 = mild depression, 10-14 = moderate depression, 15-19 = moderately severe depression, 20-27 = severe depression  Depression Interventions:  Patient declines any further evaluation/treatment for this issue  Follows up with psychiatrist      Drug Use Screening:    DAST-10 Score Interpretation:  1-2: Low level - Monitor, re-assess at a later date; 3-5: Moderate level - Further Investigation; 6-8: Substantial level - Intensive Assessment; 9-10: Severe level - Intensive Assessment  Substance Use - Drug Use Interventions: On Norco from Pain Management        Health Habits/Nutrition:  Physical Activity: Inactive    Days of Exercise per Week: 0 days    Minutes of Exercise per Session: 20 min     Have you lost any weight without trying in the past 3 months?: No  Body mass index: 22.3  Have you seen the dentist within the past year?: (!) No  Health Habits/Nutrition Interventions:  Dental exam overdue:  patient declines dental evaluation             Objective   Vitals:    07/21/22 1356   BP: 110/60   Pulse: 95   Temp: 97.5 °F (36.4 °C)   SpO2: 97%   Weight: 134 lb (60.8 kg)   Height: 5' 5\" (1.651 m)      Body mass index is 22.3 kg/m².              Allergies   Allergen Reactions Epinephrine Other (See Comments)     PATIENT SATES \"HEART PALPATATIONS. \"     Adhesive Tape Rash     Prior to Visit Medications    Medication Sig Taking? Authorizing Provider   mirtazapine (REMERON) 30 MG tablet TAKE 1 TABLET BY MOUTH AT BEDTIME Yes Historical Provider, MD   amitriptyline (ELAVIL) 50 MG tablet TAKE 1 TABLET BY MOUTH EVERY NIGHT AT BEDTIME Yes Historical Provider, MD   Handicap Placard MISC by Does not apply route Duration 5 years Yes Anna Marie Sung DO   ARIPiprazole (ABILIFY) 10 mg tablet TAKE ONE TABLET BY MOUTH EVERY DAY Yes Historical Provider, MD   denosumab (PROLIA) 60 MG/ML SOSY SC injection Inject 1 mL into the skin once for 1 dose Yes Anna Marie Sung DO   promethazine (PHENERGAN) 25 MG tablet TAKE 1 TABLET BY MOUTH TWICE DAILY Yes Anna Marie Sung DO   senna-docusate (PERICOLACE) 8.6-50 MG per tablet Take 2 tablets by mouth daily as needed for Constipation Yes Anna Marie Sung DO   simvastatin (ZOCOR) 10 MG tablet Take 1 tablet by mouth nightly Yes Anna Marie Sung DO   lisinopril (PRINIVIL;ZESTRIL) 20 MG tablet Take 1 tablet by mouth daily Yes Anna Marie Sung DO   vitamin D (ERGOCALCIFEROL) 1.25 MG (35994 UT) CAPS capsule Take 1 capsule by mouth every 14 days Yes Anna Marie Sung DO   LORazepam (ATIVAN) 1 MG tablet TAKE 1 TABLET BY MOUTH THREE TIMES DAILY Yes Historical Provider, MD   Multiple Vitamins-Minerals (MULTIVITAMIN ADULT) TABS Take by mouth daily Yes Historical Provider, MD   aspirin 81 MG EC tablet  Yes Historical Provider, MD   HYDROcodone-acetaminophen (NORCO)  MG per tablet Take 1 tablet by mouth every 8 hours as needed for Pain .  Yes Historical Provider, MD [place] (Including outside providers/suppliers regularly involved in providing care):   Patient Care Team:  Denna Moritz, DO as PCP - General (Internal Medicine)  Denna Moritz, DO as PCP - [facility] Empaneled Provider     Reviewed and updated this visit:  Allergies  Meds              Cardiovascular Disease Risk

## 2022-07-21 NOTE — PROGRESS NOTES
2022    Name: Norma Vasquez : 1945 Sex: female  Age: 64 y.o. Subjective:  Chief Complaint   Patient presents with    Hypertension        Hypertension  Pertinent negatives include no chest pain, headaches, palpitations or shortness of breath. .  She saw Dr. Samantha Horne about removal of her stimulator but he did not feel that removal of the spinal stimulator would make any difference in her level of back pain and recommended that she just leave it alone for now. Report. Reviewed. Her new psychiatrist started her on Abilify which was increased to 4  mg and she reports marked improvement in her depression and anxiety. This is the first time in years they have actually seen her smile and appear happy. Her  says that she is doing much better with the new medications. They have not worsened her constipation which is chronic. She continues on her lorazepam, mirtazapine, sertraline and amitriptyline. She follows up with a nurse practitioner Ms. Dong who takes care of her medications. She is with [facility] in [place]. Blood work ordered by her and reviewed by me. She saw Dr. Pedro Vora who felt she did not need a CPAP or nocturnal oxygen. He started her on lower dose amitriptyline 25 mg which was then increased to 50 mg. Patient states she feels so much better on this, she gets a full night sleep and has no side effects of dry mouth dizziness. She had unintentional weight loss. She is lost about 35  pounds since 2020. The weight loss has stabilized since the her oncologist started her on Megace 40 mg a day and mirtazapine 30 mg at bedtime. Her weight went up from 123 pounds to 132 pounds . Today's weight is 134. Her  says that she is eating much better. She is off the Megace but she continues on the mirtazapine     . She  has dysphagia which apparently was worse after GI stretched her esophagus. She went to see Dr. Vineet Harrison.   Reviewed his report. He did not feel she needed a repeat colonoscopy. He recommended continuing with a single small volume saline enema for relief of her chronic narcotic induced constipation. She also takes Metamucil and MiraLAX daily. And continues on Dulcolax suppository as needed. Despite this she still has chronic constipation. She was tried on Linzess but this was too expensive. She does not remember trying Amitiza although this was noted on a GI consult this year    Dr. Marco Almodovar did a panendoscopy on her in July of last year. Colonoscopy showed 2 polyps which apparently were \"precancerous\" and repeat scope will be scheduled in 2 years. She also had sigmoid diverticulosis. The EGD showed severe reflux esophagitis. She continues on a PPI. Her  says her chronic constipation is the most bothersome thing in her life. 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 She tried medical marijuana but it caused her to become confused and so she stopped it. .      She had a ultrasound of her thyroid to monitor her multinodular goiter. 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 Previous free T4 and TSH have been normal.  We did a thyroid nuclear uptake and scan which was normal.  Per her request she was referred to endocrinologist for further evaluation. She complains of increased irritability, dry skin, thin nails and chronic fatigue. All symptoms of possible underlying hypothyroidism. Recheck TSH and free T4 were still normal.  Thyroid studies including ultrasound, scan and uptake were all normal..      She continues to see pain management who has her on opioids. If she does not take the opioid she is unable to function because of chronic back pain.     She had a CT scan of her lumbar spine which showed degenerative disc disease multiple levels, facet arthropathy, spinal stenosis, disc bulging with impingement on neural foramina but nothing that is surgically fixable. She says the only time she feels free of pain is when she takes her hydrocodone and her lorazepam 3 times a day. The Phenergan does help her nausea but not as good as she would like it a plan to increase it from 12.5 to 25 mg a day and have her take it about half an hour to an hour after she takes her Norco and her lorazepam.  .    She has a history of osteoporosis on Prolia every 6 months. Recent DEXA scan showed osteopenia. Her  Prolia shot was given in December 2021. Her next Prolia shot given in June 2022. .. She sees Dr. Henrique Fall for follow-up on her breast cancer. She had a repeat CT scan of her chest  as she had some abnormalities on the last one. The repeat CAT scan showed resolution of the pulmonary abnormalities and her oncologist is happy with the results. She had a history of CKD stage III. We  Checked  for secondary hyperparathyroidism. Calcium was normal at 9.5 and intact PTH was normal at 30. Unlikely to have hyperparathyroidism  We  rechecked her kidney functions and her estimated GFR is 54. She had surgery on her right wrist done by Dr. Aayush singletary in October 2021    She had her flu shot today. She finished her COVID-19 vaccine series. Review of Systems   Constitutional:  Positive for appetite change. Negative for fatigue, fever and unexpected weight change. HENT:  Negative for congestion, ear pain, facial swelling, rhinorrhea, sore throat, tinnitus and trouble swallowing. Thinning hair   Eyes:  Negative for photophobia, pain, discharge, itching and visual disturbance. Respiratory:  Negative for cough, shortness of breath, wheezing and stridor. Cardiovascular:  Negative for chest pain, palpitations and leg swelling. Gastrointestinal:  Positive for constipation. Negative for abdominal pain, anal bleeding, blood in stool, diarrhea, nausea and vomiting.         Dysphagia   Endocrine: Negative for cold intolerance, heat intolerance, polydipsia, polyphagia and polyuria. Genitourinary:  Negative for difficulty urinating, dysuria, flank pain, frequency, hematuria and urgency. Musculoskeletal:  Positive for arthralgias and back pain. Negative for gait problem, joint swelling and myalgias. Skin:  Negative for color change, pallor and rash. dry   Allergic/Immunologic: Negative for environmental allergies and food allergies. Neurological:  Positive for tremors. Negative for dizziness, seizures, syncope, speech difficulty, light-headedness, numbness and headaches. Hematological:  Negative for adenopathy. Does not bruise/bleed easily. Psychiatric/Behavioral:  Negative for agitation, behavioral problems, confusion, sleep disturbance and suicidal ideas. The patient is nervous/anxious.          Irritable          Current Outpatient Medications:     mirtazapine (REMERON) 30 MG tablet, TAKE 1 TABLET BY MOUTH AT BEDTIME, Disp: , Rfl:     lisinopril (PRINIVIL;ZESTRIL) 20 MG tablet, Take 1 tablet by mouth in the morning., Disp: 90 tablet, Rfl: 1    simvastatin (ZOCOR) 10 MG tablet, Take 1 tablet by mouth nightly, Disp: 90 tablet, Rfl: 1    vitamin D (ERGOCALCIFEROL) 1.25 MG (77041 UT) CAPS capsule, Take 1 capsule by mouth every 14 days, Disp: 6 capsule, Rfl: 2    amitriptyline (ELAVIL) 50 MG tablet, TAKE 1 TABLET BY MOUTH EVERY NIGHT AT BEDTIME, Disp: 90 tablet, Rfl: 1    Handicap Placard MISC, by Does not apply route Duration 5 years, Disp: 1 each, Rfl: 0    ARIPiprazole (ABILIFY) 10 mg tablet, TAKE ONE TABLET BY MOUTH EVERY DAY, Disp: , Rfl:     denosumab (PROLIA) 60 MG/ML SOSY SC injection, Inject 1 mL into the skin once for 1 dose, Disp: 180 mL, Rfl: 0    promethazine (PHENERGAN) 25 MG tablet, TAKE 1 TABLET BY MOUTH TWICE DAILY, Disp: 60 tablet, Rfl: 3    senna-docusate (PERICOLACE) 8.6-50 MG per tablet, Take 2 tablets by mouth daily as needed for Constipation, Disp: 60 tablet, Rfl: 5    LORazepam (ATIVAN) 1 MG tablet, TAKE 1 TABLET BY MOUTH THREE TIMES DAILY, Disp: , Rfl:     Multiple Vitamins-Minerals (MULTIVITAMIN ADULT) TABS, Take by mouth daily, Disp: , Rfl:     aspirin 81 MG EC tablet, , Disp: , Rfl:     HYDROcodone-acetaminophen (NORCO)  MG per tablet, Take 1 tablet by mouth every 8 hours as needed for Pain ., Disp: , Rfl:      Allergies   Allergen Reactions    Epinephrine Other (See Comments)     PATIENT SATES \"HEART PALPATATIONS. \"     Adhesive Tape Rash        Past Medical History:   Diagnosis Date    Abnormal Pap smear of cervix     Chronic obstructive lung disease ([facility] [address] 7/9/2018    Chronic pain     Chronic respiratory failure with hypoxia ([facility] [address] 6/6/2019    Depression     Hyperlipidemia     Hypertension     Osteoarthritis     Osteoporosis     Palpitations     Severe episode of recurrent major depressive disorder, without psychotic features [facility] 6/19/2019    Sleep apnea     doesnt use her cpap       There are no preventive care reminders to display for this patient.        Patient Active Problem List   Diagnosis    Low back pain    Degeneration of lumbar or lumbosacral intervertebral disc    Spinal stenosis    Depression    Chronic pain    Gastroesophageal reflux disease    Hyperlipidemia    Essential hypertension    Osteoporosis    Thyroid nodule    Hypoxia, sleep related    Need for tetanus, diphtheria, and acellular pertussis (Tdap) vaccine    Drug-induced constipation    Stage 3 chronic kidney disease (HCC)    Non-intractable vomiting with nausea    Abnormal urine odor    Urinary frequency    Hyperglycemia    Lower abdominal pain    Insomnia due to other mental disorder    History of uterine cancer    Family history of colon cancer    Family history of breast cancer    History of malignant neoplasm of breast    Encounter for pre-operative examination    Rib pain    Tremor    Essential tremor        Past Surgical History:   Procedure Laterality Date    BACK SURGERY  04/19/2017    T7 spinal cord stimulator with Dr. Lolis Mccormack      breast reconstruction w/implants    COLONOSCOPY      COSMETIC SURGERY      jaw surgery    ENDOSCOPY, COLON, DIAGNOSTIC      HYSTERECTOMY (CERVIX STATUS UNKNOWN)      complete    MASTECTOMY Bilateral ,     left and right    OTHER SURGICAL HISTORY N/A 2017    stage 1  5 day spinal cord stimulator trial [employer]    SKIN BIOPSY      moles    TONSILLECTOMY          Family History   Problem Relation Age of Onset    No Known Problems Mother     No Known Problems Father         Social History     Tobacco Use    Smoking status: Former     Packs/day: 1.00     Years: 12.00     Pack years: 12.00     Types: Cigarettes     Start date: 1965     Quit date: 1974     Years since quittin.1    Smokeless tobacco: Never   Vaping Use    Vaping Use: Never used   Substance Use Topics    Alcohol use: No    Drug use: Yes     Comment: Norco Lorazepam        Objective  Vitals:    22 1335   BP: 110/60   Pulse: 95   Temp: 97.5 °F (36.4 °C)   SpO2: 97%   Weight: 134 lb (60.8 kg)   Height: 5' 5\" (1.651 m)        Exam:  Physical Exam  Vitals reviewed. Exam conducted with a chaperone present. Constitutional:       General: She is not in acute distress. Appearance: She is well-developed. She is not ill-appearing. Comments: Thin   HENT:      Head: Normocephalic and atraumatic. Right Ear: External ear normal.      Left Ear: External ear normal.      Mouth/Throat:      Pharynx: No oropharyngeal exudate. Eyes:      General: No scleral icterus. Right eye: No discharge. Left eye: No discharge. Conjunctiva/sclera: Conjunctivae normal.      Pupils: Pupils are equal, round, and reactive to light. Neck:      Thyroid: No thyromegaly. Cardiovascular:      Rate and Rhythm: Normal rate and regular rhythm. Heart sounds: Normal heart sounds. No murmur heard. No friction rub. No gallop.    Pulmonary:      Effort: Pulmonary effort is normal. No respiratory distress. Breath sounds: Normal breath sounds. No wheezing or rales. Chest:      Chest wall: No tenderness. Abdominal:      General: Bowel sounds are normal. There is no distension. Palpations: Abdomen is soft. There is no mass. Tenderness: There is no abdominal tenderness. There is no guarding or rebound. Musculoskeletal:         General: Tenderness present. No deformity. Normal range of motion. Cervical back: Normal range of motion and neck supple. No rigidity. Lymphadenopathy:      Cervical: No cervical adenopathy. Skin:     General: Skin is warm and dry. Coloration: Skin is not pale. Findings: No erythema or rash. Neurological:      Mental Status: She is alert and oriented to person, place, and time. Cranial Nerves: No cranial nerve deficit. Sensory: No sensory deficit. Deep Tendon Reflexes: Reflexes normal.      Comments: Intention tremor   Psychiatric:         Mood and Affect: Mood normal.         Behavior: Behavior normal.         Thought Content: Thought content normal.         Judgment: Judgment normal.      Comments: Mood is sad        Last labs reviewed. ASSESSMENT & PLAN :         Hypertension:  Continue medications, her blood pressures are acceptable    Chronic respiratory failure with hypoxia  Not present per sleep specialist.  She does not need CPAP or nocturnal oxygen. Hypoxia, sleep related  No further problems. She is able to get good REM sleep and sleep specialist does not think she needs nocturnal oxygen    Gastroesophageal reflux disease  Stable, on no medications    Thyroid nodule  Benign by biopsy, will monitor  Was told by endocrinologist that her thyroid was okay. We will continue to monitor with an ultrasound of her thyroid in September 2021    Stage III chronic kidney disease  Resolved, no longer stage III now with stage II    Chronic pain  Continue Norco per pain management. For now. refer to Dr. Maria Del Rosario Mariee , unfortunately medical marijuana caused memory problems so she is not on it    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 Radha Bloodgood Spinal stenosis  Take her Norco as directed. CT scan of her spine showed facet arthropathy, spinal stenosis, disc disease and arthritis. Nothing that can be remedied by surgical approach    Hyperglycemia  Hemoglobin A1c was normal despite slightly increased fasting blood sugar    Chronic pain syndrome  Continue follow-up with pain management specialist.. History of osteoporosis  Prolia injection as per recommendation of her oncologist given in December 2021. Hyperlipidemia    Continue her simvastatin and her diet    Chronic kidney disease stage IIIa  Check CBC and CMP to make sure it is not worsening all the medications that she is on. Avoid NSAID use. Chronic depression  Continue follow-up with psychiatrist.  Cindy Hair present combination of psychotropic medications that it is working for her. Vitamin D deficiency  Check 25-hydroxy vitamin D level. Continue her per present dose of 50,000 IUs every 2 weeks    Essential tremor  Trial propranolol tablets 20 mg one half once a day. See if she can tolerate it and if she can we can increase it slowly        Follow-up:  I will see her in 3 months.         Electronically signed    Pao Scott DO

## 2022-07-21 NOTE — PATIENT INSTRUCTIONS
Personalized Preventive Plan for Mart Palma - 7/21/2022  Medicare offers a range of preventive health benefits. Some of the tests and screenings are paid in full while other may be subject to a deductible, co-insurance, and/or copay. Some of these benefits include a comprehensive review of your medical history including lifestyle, illnesses that may run in your family, and various assessments and screenings as appropriate. After reviewing your medical record and screening and assessments performed today your provider may have ordered immunizations, labs, imaging, and/or referrals for you. A list of these orders (if applicable) as well as your Preventive Care list are included within your After Visit Summary for your review. Other Preventive Recommendations:    A preventive eye exam performed by an eye specialist is recommended every 1-2 years to screen for glaucoma; cataracts, macular degeneration, and other eye disorders. A preventive dental visit is recommended every 6 months. Try to get at least 150 minutes of exercise per week or 10,000 steps per day on a pedometer . Order or download the FREE \"Exercise & Physical Activity: Your Everyday Guide\" from The [employer]. Call 3-313.238.9107 or search The [employer] online. You need 8415-7609 mg of calcium and 9884-7313 IU of vitamin D per day. It is possible to meet your calcium requirement with diet alone, but a vitamin D supplement is usually necessary to meet this goal.  When exposed to the sun, use a sunscreen that protects against both UVA and UVB radiation with an SPF of 30 or greater. Reapply every 2 to 3 hours or after sweating, drying off with a towel, or swimming. Always wear a seat belt when traveling in a car. Always wear a helmet when riding a bicycle or motorcycle.

## 2022-07-22 RX ORDER — PROPRANOLOL HYDROCHLORIDE 10 MG/1
TABLET ORAL
Qty: 90 TABLET | Refills: 1 | Status: SHIPPED
Start: 2022-07-22 | End: 2022-10-27 | Stop reason: SINTOL

## 2022-08-04 ENCOUNTER — TELEPHONE (OUTPATIENT)
Dept: PRIMARY CARE CLINIC | Age: 77
End: 2022-08-04

## 2022-08-04 NOTE — TELEPHONE ENCOUNTER
called and said that he thinks the propranolol that you prescribed [facility] is giving her some issues. He states that she had vertigo and was sick all day yesterday and she did end up vomiting. He said he did give it to her again today (without any other medications yet) and she is starting to feel that way again. He wants to know if she should continue to take it.

## 2022-10-23 DIAGNOSIS — F33.2 SEVERE EPISODE OF RECURRENT MAJOR DEPRESSIVE DISORDER, WITHOUT PSYCHOTIC FEATURES (HCC): ICD-10-CM

## 2022-10-24 RX ORDER — AMITRIPTYLINE HYDROCHLORIDE 50 MG/1
TABLET, FILM COATED ORAL
Qty: 90 TABLET | Refills: 1 | Status: SHIPPED | OUTPATIENT
Start: 2022-10-24

## 2022-10-24 RX ORDER — AMITRIPTYLINE HYDROCHLORIDE 50 MG/1
TABLET, FILM COATED ORAL
Qty: 90 TABLET | Refills: 1 | Status: SHIPPED
Start: 2022-10-24 | End: 2022-10-27 | Stop reason: SDUPTHER

## 2022-10-27 ENCOUNTER — OFFICE VISIT (OUTPATIENT)
Dept: PRIMARY CARE CLINIC | Age: 77
End: 2022-10-27
Payer: MEDICARE

## 2022-10-27 VITALS
TEMPERATURE: 97.3 F | HEART RATE: 101 BPM | DIASTOLIC BLOOD PRESSURE: 70 MMHG | SYSTOLIC BLOOD PRESSURE: 120 MMHG | WEIGHT: 135 LBS | OXYGEN SATURATION: 97 % | BODY MASS INDEX: 22.47 KG/M2

## 2022-10-27 DIAGNOSIS — R73.9 HYPERGLYCEMIA: ICD-10-CM

## 2022-10-27 DIAGNOSIS — G89.4 CHRONIC PAIN SYNDROME: ICD-10-CM

## 2022-10-27 DIAGNOSIS — Z51.81 MEDICATION MONITORING ENCOUNTER: ICD-10-CM

## 2022-10-27 DIAGNOSIS — G25.0 ESSENTIAL TREMOR: ICD-10-CM

## 2022-10-27 DIAGNOSIS — F33.2 SEVERE EPISODE OF RECURRENT MAJOR DEPRESSIVE DISORDER, WITHOUT PSYCHOTIC FEATURES (HCC): ICD-10-CM

## 2022-10-27 DIAGNOSIS — E78.2 MIXED HYPERLIPIDEMIA: ICD-10-CM

## 2022-10-27 DIAGNOSIS — E04.1 THYROID NODULE: ICD-10-CM

## 2022-10-27 DIAGNOSIS — N18.31 STAGE 3A CHRONIC KIDNEY DISEASE (HCC): ICD-10-CM

## 2022-10-27 DIAGNOSIS — M81.0 AGE-RELATED OSTEOPOROSIS WITHOUT CURRENT PATHOLOGICAL FRACTURE: Primary | ICD-10-CM

## 2022-10-27 DIAGNOSIS — M51.37 DEGENERATION OF LUMBAR OR LUMBOSACRAL INTERVERTEBRAL DISC: Chronic | ICD-10-CM

## 2022-10-27 DIAGNOSIS — E55.9 VITAMIN D DEFICIENCY: ICD-10-CM

## 2022-10-27 DIAGNOSIS — Z78.0 MENOPAUSE: ICD-10-CM

## 2022-10-27 DIAGNOSIS — I10 ESSENTIAL HYPERTENSION: ICD-10-CM

## 2022-10-27 DIAGNOSIS — M81.6 LOCALIZED OSTEOPOROSIS WITHOUT CURRENT PATHOLOGICAL FRACTURE: ICD-10-CM

## 2022-10-27 DIAGNOSIS — R25.1 TREMOR: ICD-10-CM

## 2022-10-27 PROBLEM — M19.019 LOCALIZED, PRIMARY OSTEOARTHRITIS OF SHOULDER REGION: Status: ACTIVE | Noted: 2022-10-27

## 2022-10-27 PROBLEM — M54.6 PAIN IN THORACIC SPINE: Status: ACTIVE | Noted: 2022-10-27

## 2022-10-27 LAB
ALBUMIN SERPL-MCNC: 5 G/DL (ref 3.5–5.2)
ALP BLD-CCNC: 51 U/L (ref 35–104)
ALT SERPL-CCNC: 9 U/L (ref 0–32)
ANION GAP SERPL CALCULATED.3IONS-SCNC: 13 MMOL/L (ref 7–16)
AST SERPL-CCNC: 22 U/L (ref 0–31)
BASOPHILS ABSOLUTE: 0.06 E9/L (ref 0–0.2)
BASOPHILS RELATIVE PERCENT: 0.8 % (ref 0–2)
BILIRUB SERPL-MCNC: 0.3 MG/DL (ref 0–1.2)
BUN BLDV-MCNC: 22 MG/DL (ref 6–23)
CALCIUM SERPL-MCNC: 9.9 MG/DL (ref 8.6–10.2)
CHLORIDE BLD-SCNC: 96 MMOL/L (ref 98–107)
CHOLESTEROL, TOTAL: 244 MG/DL (ref 0–199)
CO2: 27 MMOL/L (ref 22–29)
CREAT SERPL-MCNC: 1 MG/DL (ref 0.5–1)
EOSINOPHILS ABSOLUTE: 0.2 E9/L (ref 0.05–0.5)
EOSINOPHILS RELATIVE PERCENT: 2.7 % (ref 0–6)
GFR SERPL CREATININE-BSD FRML MDRD: 57 ML/MIN/1.73
GLUCOSE BLD-MCNC: 93 MG/DL (ref 74–99)
HBA1C MFR BLD: 5.7 % (ref 4–5.6)
HCT VFR BLD CALC: 38.9 % (ref 34–48)
HDLC SERPL-MCNC: 60 MG/DL
HEMOGLOBIN: 12.5 G/DL (ref 11.5–15.5)
IMMATURE GRANULOCYTES #: 0.03 E9/L
IMMATURE GRANULOCYTES %: 0.4 % (ref 0–5)
LDL CHOLESTEROL CALCULATED: 147 MG/DL (ref 0–99)
LYMPHOCYTES ABSOLUTE: 2.35 E9/L (ref 1.5–4)
LYMPHOCYTES RELATIVE PERCENT: 31.8 % (ref 20–42)
MCH RBC QN AUTO: 29.3 PG (ref 26–35)
MCHC RBC AUTO-ENTMCNC: 32.1 % (ref 32–34.5)
MCV RBC AUTO: 91.1 FL (ref 80–99.9)
MONOCYTES ABSOLUTE: 0.65 E9/L (ref 0.1–0.95)
MONOCYTES RELATIVE PERCENT: 8.8 % (ref 2–12)
NEUTROPHILS ABSOLUTE: 4.1 E9/L (ref 1.8–7.3)
NEUTROPHILS RELATIVE PERCENT: 55.5 % (ref 43–80)
PDW BLD-RTO: 14 FL (ref 11.5–15)
PLATELET # BLD: 438 E9/L (ref 130–450)
PMV BLD AUTO: 9 FL (ref 7–12)
POTASSIUM SERPL-SCNC: 4.4 MMOL/L (ref 3.5–5)
RBC # BLD: 4.27 E12/L (ref 3.5–5.5)
SODIUM BLD-SCNC: 136 MMOL/L (ref 132–146)
T3 FREE: 2.8 PG/ML (ref 2–4.4)
T4 FREE: 1.05 NG/DL (ref 0.93–1.7)
TOTAL PROTEIN: 7.9 G/DL (ref 6.4–8.3)
TRIGL SERPL-MCNC: 187 MG/DL (ref 0–149)
TSH SERPL DL<=0.05 MIU/L-ACNC: 1.26 UIU/ML (ref 0.27–4.2)
VITAMIN D 25-HYDROXY: 37 NG/ML (ref 30–100)
VLDLC SERPL CALC-MCNC: 37 MG/DL
WBC # BLD: 7.4 E9/L (ref 4.5–11.5)

## 2022-10-27 PROCEDURE — 1036F TOBACCO NON-USER: CPT | Performed by: INTERNAL MEDICINE

## 2022-10-27 PROCEDURE — 1123F ACP DISCUSS/DSCN MKR DOCD: CPT | Performed by: INTERNAL MEDICINE

## 2022-10-27 PROCEDURE — G8420 CALC BMI NORM PARAMETERS: HCPCS | Performed by: INTERNAL MEDICINE

## 2022-10-27 PROCEDURE — 3078F DIAST BP <80 MM HG: CPT | Performed by: INTERNAL MEDICINE

## 2022-10-27 PROCEDURE — 1090F PRES/ABSN URINE INCON ASSESS: CPT | Performed by: INTERNAL MEDICINE

## 2022-10-27 PROCEDURE — G8484 FLU IMMUNIZE NO ADMIN: HCPCS | Performed by: INTERNAL MEDICINE

## 2022-10-27 PROCEDURE — G8399 PT W/DXA RESULTS DOCUMENT: HCPCS | Performed by: INTERNAL MEDICINE

## 2022-10-27 PROCEDURE — G8427 DOCREV CUR MEDS BY ELIG CLIN: HCPCS | Performed by: INTERNAL MEDICINE

## 2022-10-27 PROCEDURE — 99214 OFFICE O/P EST MOD 30 MIN: CPT | Performed by: INTERNAL MEDICINE

## 2022-10-27 PROCEDURE — 3074F SYST BP LT 130 MM HG: CPT | Performed by: INTERNAL MEDICINE

## 2022-10-27 RX ORDER — ARIPIPRAZOLE 5 MG/1
TABLET ORAL
COMMUNITY
Start: 2022-08-31

## 2022-10-27 RX ORDER — SERTRALINE HYDROCHLORIDE 100 MG/1
TABLET, FILM COATED ORAL
COMMUNITY
Start: 2022-10-16

## 2022-10-27 ASSESSMENT — ENCOUNTER SYMPTOMS
SORE THROAT: 0
EYE PAIN: 0
BACK PAIN: 1
TROUBLE SWALLOWING: 0
FACIAL SWELLING: 0
BLOOD IN STOOL: 0
WHEEZING: 0
DIARRHEA: 0
COLOR CHANGE: 0
NAUSEA: 0
VOMITING: 0
ANAL BLEEDING: 0
ROS SKIN COMMENTS: DRY
CONSTIPATION: 1
STRIDOR: 0
COUGH: 0
RHINORRHEA: 0
EYE DISCHARGE: 0
PHOTOPHOBIA: 0
EYE ITCHING: 0
SHORTNESS OF BREATH: 0
ABDOMINAL PAIN: 0

## 2022-10-27 NOTE — PROGRESS NOTES
10/27/2022    Name: Chantale Ortega : 1945 Sex: female  Age: 76 y.o. Subjective:  Chief Complaint   Patient presents with    Hypertension        Hypertension  Pertinent negatives include no chest pain, headaches, palpitations or shortness of breath. GERD     . She saw Dr. Tamara Willard about removal of her stimulator but he did not feel that removal of the spinal stimulator would make any difference in her level of back pain and recommended that she just leave it alone for now. Report. Reviewed. Her new psychiatrist started her on Abilify which was increased to 5  mg and she reports marked improvement in her depression and anxiety. This is the first time in years they have actually seen her smile and appear happy. Her  says that she is doing much better with the new medications. They have not worsened her constipation which is chronic. She continues on her lorazepam, mirtazapine, sertraline and amitriptyline. She follows up with a nurse practitioner Ms. Dong who takes care of her medications. She is with [facility] in [place]. Blood work ordered by her will be drawn today. She saw Dr. Isabel Hernandez who felt she did not need a CPAP or nocturnal oxygen. He started her on lower dose amitriptyline 25 mg which was then increased to 50 mg. Patient states she feels so much better on this, she gets a full night sleep and has no side effects of dry mouth dizziness. She had unintentional weight loss. She is lost about 35  pounds since 2020. The weight loss has stabilized since the her oncologist started her on Megace 40 mg a day and mirtazapine 30 mg at bedtime. Her weight went up from 123 pounds to 132 pounds . Today's weight is 134. Her  says that she is eating much better. She is off the Megace but she continues on the mirtazapine her weight has been stable. .  She  has dysphagia which apparently was worse after GI stretched her esophagus.   She went to see Dr. Cristina Marquez. Reviewed his report. He did not feel she needed a repeat colonoscopy. He recommended continuing with a single small volume saline enema for relief of her chronic narcotic induced constipation. She also takes Metamucil and MiraLAX daily. And continues on Dulcolax suppository as needed. Despite this she still has chronic constipation. She was tried on Linzess but this was too expensive. She does not remember trying Amitiza although this was noted on a GI consult this year    Dr. Gareth Warren did a panendoscopy on her in July of last year. Colonoscopy showed 2 polyps which apparently were \"precancerous\" and repeat scope will be scheduled in 2 years. She also had sigmoid diverticulosis. The EGD showed severe reflux esophagitis. She continues on a PPI. Her  says her chronic constipation is the most bothersome thing in her life. 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 She tried medical marijuana but it caused her to become confused and so she stopped it. .      She had a ultrasound of her thyroid to monitor her multinodular goiter. 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 Previous free T4 and TSH have been normal.  We did a thyroid nuclear uptake and scan which was normal.  Per her request she was referred to endocrinologist for further evaluation. She complains of increased irritability, dry skin, thin nails and chronic fatigue. All symptoms of possible underlying hypothyroidism. Recheck TSH and free T4 were still normal.  Thyroid studies including ultrasound, scan and uptake were all normal..      She continues to see pain management who has her on opioids. If she does not take the opioid she is unable to function because of chronic back pain.     She had a CT scan of her lumbar spine which showed degenerative disc disease multiple levels, facet arthropathy, spinal stenosis, disc bulging with impingement on neural foramina but nothing that is surgically fixable. She says the only time she feels free of pain is when she takes her hydrocodone and her lorazepam 3 times a day. The Phenergan does help her nausea but not as good as she would like it a plan to increase it from 12.5 to 25 mg a day and have her take it about half an hour to an hour after she takes her Norco and her lorazepam.  .    She has a history of osteoporosis on Prolia every 6 months. Last DEXA scan showed osteopenia. This was 2 years ago so she needs a new one. Her  Prolia shot was given in December 2021. Her next Prolia shot was given in June 2022. .. She will be due for 1 at the end of the year. We discussed that she has been on Prolia for over 2 years and that its time to get her off of it. We will need to transition her to a bisphosphonate after discontinuing Prolia. We cannot give her oral bisphosphonates because of her severe reflux symptoms. We will consider IV bisphosphonate after we get the bone density scan    She sees Dr. Laila Harvey for follow-up on her breast cancer. She had a repeat CT scan of her chest  as she had some abnormalities on the last one. The repeat CAT scan showed resolution of the pulmonary abnormalities and her oncologist is happy with the results. She had a history of CKD stage III. We  Checked  for secondary hyperparathyroidism. Calcium was normal at 9.5 and intact PTH was normal at 30. Unlikely to have hyperparathyroidism  We  rechecked her kidney functions and her estimated GFR is 54. She had her flu shot . She finished her COVID-19 vaccine series. Review of Systems   Constitutional:  Positive for appetite change. Negative for fatigue, fever and unexpected weight change. HENT:  Negative for congestion, ear pain, facial swelling, rhinorrhea, sore throat, tinnitus and trouble swallowing.          Thinning hair   Eyes:  Negative for photophobia, pain, discharge, itching and visual disturbance. Respiratory:  Negative for cough, shortness of breath, wheezing and stridor. Cardiovascular:  Negative for chest pain, palpitations and leg swelling. Gastrointestinal:  Positive for constipation. Negative for abdominal pain, anal bleeding, blood in stool, diarrhea, nausea and vomiting. Dysphagia   Endocrine: Negative for cold intolerance, heat intolerance, polydipsia, polyphagia and polyuria. Genitourinary:  Negative for difficulty urinating, dysuria, flank pain, frequency, hematuria and urgency. Musculoskeletal:  Positive for arthralgias and back pain. Negative for gait problem, joint swelling and myalgias. Skin:  Negative for color change, pallor and rash. dry   Allergic/Immunologic: Negative for environmental allergies and food allergies. Neurological:  Positive for tremors. Negative for dizziness, seizures, syncope, speech difficulty, light-headedness, numbness and headaches. Hematological:  Negative for adenopathy. Does not bruise/bleed easily. Psychiatric/Behavioral:  Negative for agitation, behavioral problems, confusion, sleep disturbance and suicidal ideas. The patient is nervous/anxious.          Irritable          Current Outpatient Medications:     diclofenac sodium (VOLTAREN) 1 % GEL, APPLY 4 GRAMS TO BACK EXTERNALLY 4 TIMES A DAY, Disp: , Rfl:     sertraline (ZOLOFT) 100 MG tablet, TAKE ONE TABLET BY MOUTH EVERY DAY, Disp: , Rfl:     ARIPiprazole (ABILIFY) 5 MG tablet, TAKE ONE TABLET BY MOUTH EVERY DAY, Disp: , Rfl:     amitriptyline (ELAVIL) 50 MG tablet, TAKE 1 TABLET BY MOUTH EVERY NIGHT AT BEDTIME, Disp: 90 tablet, Rfl: 1    mirtazapine (REMERON) 30 MG tablet, TAKE 1 TABLET BY MOUTH AT BEDTIME, Disp: , Rfl:     lisinopril (PRINIVIL;ZESTRIL) 20 MG tablet, Take 1 tablet by mouth in the morning., Disp: 90 tablet, Rfl: 1    simvastatin (ZOCOR) 10 MG tablet, Take 1 tablet by mouth nightly, Disp: 90 tablet, Rfl: 1    vitamin D (ERGOCALCIFEROL) 1.25 MG (60416 UT) CAPS capsule, Take 1 capsule by mouth every 14 days, Disp: 6 capsule, Rfl: 2    Handicap Placard MISC, by Does not apply route Duration 5 years, Disp: 1 each, Rfl: 0    promethazine (PHENERGAN) 25 MG tablet, TAKE 1 TABLET BY MOUTH TWICE DAILY, Disp: 60 tablet, Rfl: 3    senna-docusate (PERICOLACE) 8.6-50 MG per tablet, Take 2 tablets by mouth daily as needed for Constipation, Disp: 60 tablet, Rfl: 5    LORazepam (ATIVAN) 1 MG tablet, TAKE 1 TABLET BY MOUTH THREE TIMES DAILY, Disp: , Rfl:     Multiple Vitamins-Minerals (MULTIVITAMIN ADULT) TABS, Take by mouth daily, Disp: , Rfl:     aspirin 81 MG EC tablet, , Disp: , Rfl:     HYDROcodone-acetaminophen (NORCO)  MG per tablet, Take 1 tablet by mouth every 8 hours as needed for Pain ., Disp: , Rfl:     denosumab (PROLIA) 60 MG/ML SOSY SC injection, Inject 1 mL into the skin once for 1 dose, Disp: 180 mL, Rfl: 0     Allergies   Allergen Reactions    Epinephrine Other (See Comments)     PATIENT SATES \"HEART PALPATATIONS. \"     Adhesive Tape Rash        Past Medical History:   Diagnosis Date    Abnormal Pap smear of cervix     Chronic obstructive lung disease ([facility] [address] 7/9/2018    Chronic pain     Chronic respiratory failure with hypoxia ([facility] [address] 6/6/2019    Depression     Hyperlipidemia     Hypertension     Osteoarthritis     Osteoporosis     Palpitations     Severe episode of recurrent major depressive disorder, without psychotic features ([facility] [address] 6/19/2019    Sleep apnea     doesnt use her cpap       There are no preventive care reminders to display for this patient.        Patient Active Problem List   Diagnosis    Low back pain    Degeneration of lumbar or lumbosacral intervertebral disc    Spinal stenosis    Depression    Chronic pain    Gastroesophageal reflux disease    Hyperlipidemia    Essential hypertension    Osteoporosis    Thyroid nodule    Hypoxia, sleep related    Need for tetanus, diphtheria, and acellular pertussis (Tdap) vaccine    Drug-induced constipation    Stage 3 chronic kidney disease (HCC)    Non-intractable vomiting with nausea    Abnormal urine odor    Urinary frequency    Hyperglycemia    Lower abdominal pain    Insomnia due to other mental disorder    History of uterine cancer    Family history of colon cancer    Family history of breast cancer    History of malignant neoplasm of breast    Encounter for pre-operative examination    Rib pain    Tremor    Essential tremor    Pain in thoracic spine    Localized, primary osteoarthritis of shoulder region    Medication monitoring encounter        Past Surgical History:   Procedure Laterality Date    BACK SURGERY  2017    T7 spinal cord stimulator with Dr. Christie Mckenzie      breast reconstruction w/implants    COLONOSCOPY      COSMETIC SURGERY      jaw surgery    ENDOSCOPY, COLON, DIAGNOSTIC      HYSTERECTOMY (CERVIX STATUS UNKNOWN)  2004    complete    MASTECTOMY Bilateral ,     left and right    OTHER SURGICAL HISTORY N/A 2017    stage 1  5 day spinal cord stimulator trial Maaguzi    SKIN BIOPSY      moles    TONSILLECTOMY          Family History   Problem Relation Age of Onset    No Known Problems Mother     No Known Problems Father         Social History     Tobacco Use    Smoking status: Former     Packs/day: 1.00     Years: 12.00     Pack years: 12.00     Types: Cigarettes     Start date: 1965     Quit date: 1974     Years since quittin.4    Smokeless tobacco: Never   Vaping Use    Vaping Use: Never used   Substance Use Topics    Alcohol use: No    Drug use: Yes     Comment: Norco, Lorazepam        Objective  Vitals:    10/27/22 1228   BP: 120/70   Pulse: (!) 101   Temp: 97.3 °F (36.3 °C)   TempSrc: Temporal   SpO2: 97%   Weight: 135 lb (61.2 kg)        Exam:  Physical Exam  Vitals reviewed. Exam conducted with a chaperone present. Constitutional:       General: She is not in acute distress. Appearance: She is well-developed.  She is not ill-appearing. Comments: Thin   HENT:      Head: Normocephalic and atraumatic. Right Ear: External ear normal.      Left Ear: External ear normal.      Mouth/Throat:      Pharynx: No oropharyngeal exudate. Eyes:      General: No scleral icterus. Right eye: No discharge. Left eye: No discharge. Conjunctiva/sclera: Conjunctivae normal.      Pupils: Pupils are equal, round, and reactive to light. Neck:      Thyroid: No thyromegaly. Cardiovascular:      Rate and Rhythm: Normal rate and regular rhythm. Heart sounds: Normal heart sounds. No murmur heard. No friction rub. No gallop. Pulmonary:      Effort: Pulmonary effort is normal. No respiratory distress. Breath sounds: Normal breath sounds. No wheezing or rales. Chest:      Chest wall: No tenderness. Abdominal:      General: Bowel sounds are normal. There is no distension. Palpations: Abdomen is soft. There is no mass. Tenderness: There is no abdominal tenderness. There is no guarding or rebound. Musculoskeletal:         General: Tenderness present. No deformity. Normal range of motion. Cervical back: Normal range of motion and neck supple. No rigidity. Lymphadenopathy:      Cervical: No cervical adenopathy. Skin:     General: Skin is warm and dry. Coloration: Skin is not pale. Findings: No erythema or rash. Neurological:      Mental Status: She is alert and oriented to person, place, and time. Cranial Nerves: No cranial nerve deficit. Sensory: No sensory deficit. Deep Tendon Reflexes: Reflexes normal.      Comments: Intention tremor   Psychiatric:         Mood and Affect: Mood normal.         Behavior: Behavior normal.         Thought Content: Thought content normal.         Judgment: Judgment normal.      Comments: Mood is sad        Last labs reviewed. ASSESSMENT & PLAN :         Hypertension:  Continue medications, her blood pressures are acceptable. Check fasting CMP both from the end of for her psychiatrist      Hypoxia, sleep related  No further problems. She is able to get good REM sleep and sleep specialist does not think she needs nocturnal oxygen    Gastroesophageal reflux disease  Stable, on no medications    Thyroid nodule  Benign by biopsy, will monitor  Was told by endocrinologist that her thyroid was okay. We will continue to monitor her TSH, free T4 and free T3. Stage III chronic kidney disease  Resolved, no longer stage III now with stage II. We will monitor with CBC and CMP. Try and avoid NSAID's and ensure adequate hydration. Chronic pain  Continue Norco per pain management. For now. referred to Dr. Silvino Li , unfortunately medical marijuana caused memory problems so she is not on it    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 Josiane Wall Spinal stenosis  Take her Norco as directed. CT scan of her spine showed facet arthropathy, spinal stenosis, disc disease and arthritis. Nothing that can be remedied by surgical approach    Hyperglycemia  Hemoglobin A1c was normal despite slightly increased fasting blood sugar    Chronic pain syndrome  Continue follow-up with pain management specialist.. History of osteoporosis  Check DEXA scan. If it shows stability or improvement we can discontinue the Prolia and start her on an IV bisphosphonate at an infusion center    Hyperlipidemia    Continue her simvastatin and her diet. Check fasting lipid profile    Chronic kidney disease stage IIIa  Check CBC and CMP to make sure it is not worsening all the medications that she is on. Avoid NSAID use.     Chronic depression  Continue follow-up with psychiatrist.  Continue present combination of psychotropic medications that it is working for her. Blood work ordered for her psychiatrist    Vitamin D deficiency  Check 25-hydroxy vitamin D level. Continue her per present dose of 50,000 IUs every 2 weeks    Essential tremor  She was unable to tolerate propranolol. She was unable to tolerate primidone in the past.  She will just have to learn to live with it as best she can. Follow-up:  I will see her in 3 months.         Electronically signed    Roque Hall DO

## 2022-11-08 NOTE — PROGRESS NOTES
We will make referral to the [facility] for Reclast injections for osteoporosis. They will need her last DEXA scan along with her last CMP sent to them.

## 2022-12-12 DIAGNOSIS — E78.2 MIXED HYPERLIPIDEMIA: ICD-10-CM

## 2022-12-12 RX ORDER — SIMVASTATIN 20 MG
20 TABLET ORAL NIGHTLY
Qty: 90 TABLET | Refills: 1 | Status: SHIPPED | OUTPATIENT
Start: 2022-12-12

## 2022-12-12 NOTE — TELEPHONE ENCOUNTER
called and said that you upped Yamilka's simvastatin to 20 mg but allowed them to us up her supply of 10 mg by doubling those. She is now running out of those and would like the 20 mg sent in.

## 2023-01-30 ENCOUNTER — OFFICE VISIT (OUTPATIENT)
Dept: PRIMARY CARE CLINIC | Age: 78
End: 2023-01-30
Payer: MEDICARE

## 2023-01-30 ENCOUNTER — TELEPHONE (OUTPATIENT)
Dept: PRIMARY CARE CLINIC | Age: 78
End: 2023-01-30

## 2023-01-30 VITALS
WEIGHT: 135 LBS | DIASTOLIC BLOOD PRESSURE: 76 MMHG | TEMPERATURE: 96.6 F | OXYGEN SATURATION: 93 % | HEART RATE: 73 BPM | BODY MASS INDEX: 22.49 KG/M2 | SYSTOLIC BLOOD PRESSURE: 122 MMHG | HEIGHT: 65 IN

## 2023-01-30 DIAGNOSIS — N18.31 STAGE 3A CHRONIC KIDNEY DISEASE (HCC): ICD-10-CM

## 2023-01-30 DIAGNOSIS — G25.0 ESSENTIAL TREMOR: ICD-10-CM

## 2023-01-30 DIAGNOSIS — M81.6 LOCALIZED OSTEOPOROSIS WITHOUT CURRENT PATHOLOGICAL FRACTURE: ICD-10-CM

## 2023-01-30 DIAGNOSIS — I10 ESSENTIAL HYPERTENSION: ICD-10-CM

## 2023-01-30 DIAGNOSIS — I10 ESSENTIAL HYPERTENSION: Primary | ICD-10-CM

## 2023-01-30 DIAGNOSIS — E78.2 MIXED HYPERLIPIDEMIA: ICD-10-CM

## 2023-01-30 DIAGNOSIS — E53.8 VITAMIN B12 DEFICIENCY: ICD-10-CM

## 2023-01-30 DIAGNOSIS — G89.4 CHRONIC PAIN SYNDROME: ICD-10-CM

## 2023-01-30 DIAGNOSIS — K21.9 GASTROESOPHAGEAL REFLUX DISEASE, UNSPECIFIED WHETHER ESOPHAGITIS PRESENT: ICD-10-CM

## 2023-01-30 DIAGNOSIS — F33.2 SEVERE EPISODE OF RECURRENT MAJOR DEPRESSIVE DISORDER, WITHOUT PSYCHOTIC FEATURES (HCC): ICD-10-CM

## 2023-01-30 DIAGNOSIS — E55.9 VITAMIN D DEFICIENCY: ICD-10-CM

## 2023-01-30 LAB
ALBUMIN SERPL-MCNC: 4.8 G/DL (ref 3.5–5.2)
ALP BLD-CCNC: 50 U/L (ref 35–104)
ALT SERPL-CCNC: 9 U/L (ref 0–32)
ANION GAP SERPL CALCULATED.3IONS-SCNC: 13 MMOL/L (ref 7–16)
AST SERPL-CCNC: 22 U/L (ref 0–31)
BASOPHILS ABSOLUTE: 0.07 E9/L (ref 0–0.2)
BASOPHILS RELATIVE PERCENT: 0.8 % (ref 0–2)
BILIRUB SERPL-MCNC: 0.3 MG/DL (ref 0–1.2)
BUN BLDV-MCNC: 25 MG/DL (ref 6–23)
CALCIUM SERPL-MCNC: 9.5 MG/DL (ref 8.6–10.2)
CHLORIDE BLD-SCNC: 99 MMOL/L (ref 98–107)
CHOLESTEROL, TOTAL: 213 MG/DL (ref 0–199)
CO2: 26 MMOL/L (ref 22–29)
CREAT SERPL-MCNC: 1 MG/DL (ref 0.5–1)
EOSINOPHILS ABSOLUTE: 0.21 E9/L (ref 0.05–0.5)
EOSINOPHILS RELATIVE PERCENT: 2.4 % (ref 0–6)
GFR SERPL CREATININE-BSD FRML MDRD: 57 ML/MIN/1.73
GLUCOSE BLD-MCNC: 116 MG/DL (ref 74–99)
HCT VFR BLD CALC: 37.6 % (ref 34–48)
HDLC SERPL-MCNC: 56 MG/DL
HEMOGLOBIN: 12.5 G/DL (ref 11.5–15.5)
IMMATURE GRANULOCYTES #: 0.02 E9/L
IMMATURE GRANULOCYTES %: 0.2 % (ref 0–5)
LDL CHOLESTEROL CALCULATED: 122 MG/DL (ref 0–99)
LYMPHOCYTES ABSOLUTE: 2.23 E9/L (ref 1.5–4)
LYMPHOCYTES RELATIVE PERCENT: 25.6 % (ref 20–42)
MCH RBC QN AUTO: 29.1 PG (ref 26–35)
MCHC RBC AUTO-ENTMCNC: 33.2 % (ref 32–34.5)
MCV RBC AUTO: 87.4 FL (ref 80–99.9)
MONOCYTES ABSOLUTE: 0.69 E9/L (ref 0.1–0.95)
MONOCYTES RELATIVE PERCENT: 7.9 % (ref 2–12)
NEUTROPHILS ABSOLUTE: 5.5 E9/L (ref 1.8–7.3)
NEUTROPHILS RELATIVE PERCENT: 63.1 % (ref 43–80)
PDW BLD-RTO: 14.1 FL (ref 11.5–15)
PLATELET # BLD: 432 E9/L (ref 130–450)
PMV BLD AUTO: 9.2 FL (ref 7–12)
POTASSIUM SERPL-SCNC: 4.6 MMOL/L (ref 3.5–5)
RBC # BLD: 4.3 E12/L (ref 3.5–5.5)
SODIUM BLD-SCNC: 138 MMOL/L (ref 132–146)
TOTAL PROTEIN: 7.8 G/DL (ref 6.4–8.3)
TRIGL SERPL-MCNC: 173 MG/DL (ref 0–149)
VITAMIN B-12: 1025 PG/ML (ref 211–946)
VITAMIN D 25-HYDROXY: 61 NG/ML (ref 30–100)
VLDLC SERPL CALC-MCNC: 35 MG/DL
WBC # BLD: 8.7 E9/L (ref 4.5–11.5)

## 2023-01-30 PROCEDURE — G8399 PT W/DXA RESULTS DOCUMENT: HCPCS | Performed by: INTERNAL MEDICINE

## 2023-01-30 PROCEDURE — G8427 DOCREV CUR MEDS BY ELIG CLIN: HCPCS | Performed by: INTERNAL MEDICINE

## 2023-01-30 PROCEDURE — 3074F SYST BP LT 130 MM HG: CPT | Performed by: INTERNAL MEDICINE

## 2023-01-30 PROCEDURE — G8484 FLU IMMUNIZE NO ADMIN: HCPCS | Performed by: INTERNAL MEDICINE

## 2023-01-30 PROCEDURE — 3078F DIAST BP <80 MM HG: CPT | Performed by: INTERNAL MEDICINE

## 2023-01-30 PROCEDURE — G8420 CALC BMI NORM PARAMETERS: HCPCS | Performed by: INTERNAL MEDICINE

## 2023-01-30 PROCEDURE — 1090F PRES/ABSN URINE INCON ASSESS: CPT | Performed by: INTERNAL MEDICINE

## 2023-01-30 PROCEDURE — 99214 OFFICE O/P EST MOD 30 MIN: CPT | Performed by: INTERNAL MEDICINE

## 2023-01-30 PROCEDURE — 1036F TOBACCO NON-USER: CPT | Performed by: INTERNAL MEDICINE

## 2023-01-30 PROCEDURE — 1123F ACP DISCUSS/DSCN MKR DOCD: CPT | Performed by: INTERNAL MEDICINE

## 2023-01-30 RX ORDER — LISINOPRIL 20 MG/1
20 TABLET ORAL DAILY
Qty: 90 TABLET | Refills: 1 | Status: SHIPPED | OUTPATIENT
Start: 2023-01-30

## 2023-01-30 RX ORDER — CLONAZEPAM 1 MG/1
TABLET ORAL
COMMUNITY
Start: 2023-01-12

## 2023-01-30 RX ORDER — IBUPROFEN 400 MG/1
400 TABLET ORAL 2 TIMES DAILY PRN
Qty: 60 TABLET | Refills: 3 | Status: SHIPPED | OUTPATIENT
Start: 2023-01-30

## 2023-01-30 RX ORDER — FAMOTIDINE 20 MG/1
20 TABLET, FILM COATED ORAL DAILY
Qty: 30 TABLET | Refills: 3 | Status: SHIPPED | OUTPATIENT
Start: 2023-01-30

## 2023-01-30 ASSESSMENT — PATIENT HEALTH QUESTIONNAIRE - PHQ9
SUM OF ALL RESPONSES TO PHQ9 QUESTIONS 1 & 2: 6
6. FEELING BAD ABOUT YOURSELF - OR THAT YOU ARE A FAILURE OR HAVE LET YOURSELF OR YOUR FAMILY DOWN: 3
7. TROUBLE CONCENTRATING ON THINGS, SUCH AS READING THE NEWSPAPER OR WATCHING TELEVISION: 3
SUM OF ALL RESPONSES TO PHQ QUESTIONS 1-9: 24
8. MOVING OR SPEAKING SO SLOWLY THAT OTHER PEOPLE COULD HAVE NOTICED. OR THE OPPOSITE, BEING SO FIGETY OR RESTLESS THAT YOU HAVE BEEN MOVING AROUND A LOT MORE THAN USUAL: 3
SUM OF ALL RESPONSES TO PHQ QUESTIONS 1-9: 27
10. IF YOU CHECKED OFF ANY PROBLEMS, HOW DIFFICULT HAVE THESE PROBLEMS MADE IT FOR YOU TO DO YOUR WORK, TAKE CARE OF THINGS AT HOME, OR GET ALONG WITH OTHER PEOPLE: 2
SUM OF ALL RESPONSES TO PHQ QUESTIONS 1-9: 27
1. LITTLE INTEREST OR PLEASURE IN DOING THINGS: 3
2. FEELING DOWN, DEPRESSED OR HOPELESS: 3
5. POOR APPETITE OR OVEREATING: 3
4. FEELING TIRED OR HAVING LITTLE ENERGY: 3
SUM OF ALL RESPONSES TO PHQ QUESTIONS 1-9: 27
9. THOUGHTS THAT YOU WOULD BE BETTER OFF DEAD, OR OF HURTING YOURSELF: 3
3. TROUBLE FALLING OR STAYING ASLEEP: 3

## 2023-01-30 ASSESSMENT — ENCOUNTER SYMPTOMS
ANAL BLEEDING: 0
EYE PAIN: 0
VOMITING: 0
COLOR CHANGE: 0
TROUBLE SWALLOWING: 0
CONSTIPATION: 1
ABDOMINAL PAIN: 0
BLOOD IN STOOL: 0
PHOTOPHOBIA: 0
NAUSEA: 0
FACIAL SWELLING: 0
SHORTNESS OF BREATH: 0
BACK PAIN: 1
DIARRHEA: 0
EYE ITCHING: 0
WHEEZING: 0
RHINORRHEA: 0
ROS SKIN COMMENTS: DRY
STRIDOR: 0
COUGH: 0
SORE THROAT: 0
EYE DISCHARGE: 0

## 2023-01-30 ASSESSMENT — COLUMBIA-SUICIDE SEVERITY RATING SCALE - C-SSRS
1. WITHIN THE PAST MONTH, HAVE YOU WISHED YOU WERE DEAD OR WISHED YOU COULD GO TO SLEEP AND NOT WAKE UP?: YES
6. HAVE YOU EVER DONE ANYTHING, STARTED TO DO ANYTHING, OR PREPARED TO DO ANYTHING TO END YOUR LIFE?: NO
2. HAVE YOU ACTUALLY HAD ANY THOUGHTS OF KILLING YOURSELF?: NO

## 2023-01-30 NOTE — PROGRESS NOTES
2023    Name: Tequila Salazar : 1945 Sex: female  Age: 76 y.o. Subjective:  Chief Complaint   Patient presents with    Hypertension        Hypertension  Pertinent negatives include no chest pain, headaches, palpitations or shortness of breath. GERD     Patient is here accompanied by her     She saw Dr. Jason Brennan an oral surgeon in [place] and he needs to extract 3 teeth. He is concerned because she is on she was given Zometa for her osteoporosis treatment a few months ago. He told her there was only about a 1% chance of her developing osteonecrosis of the jaw. I will get his records and see what he recommends. Pain management told her that if she could take some NSAID's in between her hydrocodone doses she might get better relief from her pain. She has a history of chronic kidney disease stage III A so if she takes NSAIDs. We will have to watch that very carefully. Patient and her  are aware of the risk of for worsening renal function on NSAID's. They are willing to try anything because the pain is getting away from her again. Ashley Mir Her new psychiatrist started her on Abilify which was increased to 5  mg and she reports marked improvement in her depression and anxiety. This is the first time in years they have actually seen her smile and appear happy. Her  says that she is doing much better with the new medications. They have not worsened her constipation which is chronic. She continues on her lorazepam, mirtazapine, sertraline and amitriptyline. She follows up with a nurse practitioner Ms. Dong who takes care of her medications. She is with [facility] in [place]. She saw Dr. Martha Rankin who felt she did not need a CPAP or nocturnal oxygen. He started her on lower dose amitriptyline 25 mg which was then increased to 50 mg. Patient states she feels so much better on this, she gets a full night sleep and has no side effects of dry mouth dizziness. .     .  She  has dysphagia which apparently was worse after GI stretched her esophagus. She went to see Dr. Delfino Hanson. Reviewed his report. He did not feel she needed a repeat colonoscopy. He recommended continuing with a single small volume saline enema for relief of her chronic narcotic induced constipation. She also takes Metamucil and MiraLAX daily. And continues on Dulcolax suppository as needed. Despite this she still has chronic constipation. She was tried on Linzess but this was too expensive. She does not remember trying Amitiza although this was noted on a GI consult this year    Dr. Aminata Mesa did a panendoscopy on her in July of last year. Colonoscopy showed 2 polyps which apparently were \"precancerous\" and repeat scope will be scheduled in 2 years. She also had sigmoid diverticulosis. The EGD showed severe reflux esophagitis. She is off her PPI. Her  says her chronic constipation is the most bothersome thing in her life. 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 She tried medical marijuana but it caused her to become confused and so she stopped it. .      She had a ultrasound of her thyroid to monitor her multinodular goiter. 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 Previous free T4 and TSH have been normal.  We did a thyroid nuclear uptake and scan which was normal.  Per her request she was referred to endocrinologist for further evaluation. She complains of increased irritability, dry skin, thin nails and chronic fatigue. All symptoms of possible underlying hypothyroidism. Recheck TSH and free T4 were still normal.  Thyroid studies including ultrasound, scan and uptake were all normal..      She continues to see pain management who has her on opioids. If she does not take the opioid she is unable to function because of chronic back pain.     She had a CT scan of her lumbar spine which showed degenerative disc disease multiple levels, facet arthropathy, spinal stenosis, disc bulging with impingement on neural foramina but nothing that is surgically fixable. She says the only time she feels free of pain is when she takes her hydrocodone and her lorazepam 3 times a day. The Phenergan does help her nausea but not as good as she would like it a plan to increase it from 12.5 to 25 mg a day and have her take it about half an hour to an hour after she takes her Norco and her lorazepam.  .    She has a history of osteoporosis on Prolia every 6 months. Last DEXA scan showed osteopenia. This was 2 years ago so she needs a new one. Her  Prolia shot was given in December 2021. Her next Prolia shot was given in June 2022. .. She will be due for 1 at the end of the year. We discussed that she has been on Prolia for over 2 years and that its time to get her off of it. She was given IV Zometa in December instead of Prolia. She sees Dr. Carolyn Ramos for follow-up on her breast cancer. She had a repeat CT scan of her chest  as she had some abnormalities on the last one. The repeat CAT scan showed resolution of the pulmonary abnormalities and her oncologist is happy with the results. She had a history of CKD stage III. We  Checked  for secondary hyperparathyroidism. Calcium was normal at 9.5 and intact PTH was normal at 30. Unlikely to have hyperparathyroidism  We  rechecked her kidney functions and her estimated GFR is 57. She had her flu shot . She finished her COVID-19 vaccine series. Review of Systems   Constitutional:  Positive for appetite change. Negative for fatigue, fever and unexpected weight change. HENT:  Negative for congestion, ear pain, facial swelling, rhinorrhea, sore throat, tinnitus and trouble swallowing. Thinning hair   Eyes:  Negative for photophobia, pain, discharge, itching and visual disturbance.    Respiratory:  Negative for cough, shortness of breath, wheezing and stridor. Cardiovascular:  Negative for chest pain, palpitations and leg swelling. Gastrointestinal:  Positive for constipation. Negative for abdominal pain, anal bleeding, blood in stool, diarrhea, nausea and vomiting. Dysphagia   Endocrine: Negative for cold intolerance, heat intolerance, polydipsia, polyphagia and polyuria. Genitourinary:  Negative for difficulty urinating, dysuria, flank pain, frequency, hematuria and urgency. Musculoskeletal:  Positive for arthralgias and back pain. Negative for gait problem, joint swelling and myalgias. Skin:  Negative for color change, pallor and rash. dry   Allergic/Immunologic: Negative for environmental allergies and food allergies. Neurological:  Positive for tremors. Negative for dizziness, seizures, syncope, speech difficulty, light-headedness, numbness and headaches. Hematological:  Negative for adenopathy. Does not bruise/bleed easily. Psychiatric/Behavioral:  Negative for agitation, behavioral problems, confusion, sleep disturbance and suicidal ideas. The patient is nervous/anxious.          Irritable          Current Outpatient Medications:     clonazePAM (KLONOPIN) 1 MG tablet, TAKE ONE TABLET BY MOUTH THREE TIMES A DAY FOR ANXIETY AND TREMORS., Disp: , Rfl:     lisinopril (PRINIVIL;ZESTRIL) 20 MG tablet, Take 1 tablet by mouth daily, Disp: 90 tablet, Rfl: 1    ibuprofen (ADVIL;MOTRIN) 400 MG tablet, Take 1 tablet by mouth 2 times daily as needed for Pain, Disp: 60 tablet, Rfl: 3    famotidine (PEPCID) 20 MG tablet, Take 1 tablet by mouth daily, Disp: 30 tablet, Rfl: 3    simvastatin (ZOCOR) 20 MG tablet, Take 1 tablet by mouth nightly, Disp: 90 tablet, Rfl: 1    diclofenac sodium (VOLTAREN) 1 % GEL, APPLY 4 GRAMS TO BACK EXTERNALLY 4 TIMES A DAY, Disp: , Rfl:     sertraline (ZOLOFT) 100 MG tablet, TAKE ONE TABLET BY MOUTH EVERY DAY, Disp: , Rfl:     ARIPiprazole (ABILIFY) 5 MG tablet, TAKE ONE TABLET BY MOUTH EVERY DAY, Disp: , Rfl:     amitriptyline (ELAVIL) 50 MG tablet, TAKE 1 TABLET BY MOUTH EVERY NIGHT AT BEDTIME, Disp: 90 tablet, Rfl: 1    mirtazapine (REMERON) 30 MG tablet, TAKE 1 TABLET BY MOUTH AT BEDTIME, Disp: , Rfl:     vitamin D (ERGOCALCIFEROL) 1.25 MG (65152 UT) CAPS capsule, Take 1 capsule by mouth every 14 days, Disp: 6 capsule, Rfl: 2    Handicap Placard MISC, by Does not apply route Duration 5 years, Disp: 1 each, Rfl: 0    denosumab (PROLIA) 60 MG/ML SOSY SC injection, Inject 1 mL into the skin once for 1 dose, Disp: 180 mL, Rfl: 0    promethazine (PHENERGAN) 25 MG tablet, TAKE 1 TABLET BY MOUTH TWICE DAILY, Disp: 60 tablet, Rfl: 3    senna-docusate (PERICOLACE) 8.6-50 MG per tablet, Take 2 tablets by mouth daily as needed for Constipation, Disp: 60 tablet, Rfl: 5    Multiple Vitamins-Minerals (MULTIVITAMIN ADULT) TABS, Take by mouth daily, Disp: , Rfl:     aspirin 81 MG EC tablet, , Disp: , Rfl:     HYDROcodone-acetaminophen (NORCO)  MG per tablet, Take 1 tablet by mouth every 8 hours as needed for Pain ., Disp: , Rfl:      Allergies   Allergen Reactions    Epinephrine Other (See Comments)     PATIENT SATES \"HEART PALPATATIONS. \"     Adhesive Tape Rash        Past Medical History:   Diagnosis Date    Abnormal Pap smear of cervix     [facility] 7/9/2018    Chronic pain     Chronic respiratory failure with hypoxia ([facility] 6/6/2019    Depression     Hyperlipidemia     Hypertension     Osteoarthritis     Osteoporosis     Palpitations     Severe episode of recurrent major depressive disorder, without psychotic features ([facility] 6/19/2019    Sleep apnea     doesnt use her cpap       There are no preventive care reminders to display for this patient.        Patient Active Problem List   Diagnosis    Low back pain    Degeneration of lumbar or lumbosacral intervertebral disc    Spinal stenosis    Depression    Chronic pain    Gastroesophageal reflux disease    Hyperlipidemia Essential hypertension    Osteoporosis    Thyroid nodule    Hypoxia, sleep related    Need for tetanus, diphtheria, and acellular pertussis (Tdap) vaccine    Drug-induced constipation    Stage 3 chronic kidney disease (HCC)    Non-intractable vomiting with nausea    Abnormal urine odor    Urinary frequency    Hyperglycemia    Lower abdominal pain    Insomnia due to other mental disorder    History of uterine cancer    Family history of colon cancer    Family history of breast cancer    History of malignant neoplasm of breast    Encounter for pre-operative examination    Rib pain    Tremor    Essential tremor    Pain in thoracic spine    Localized, primary osteoarthritis of shoulder region    Medication monitoring encounter    Vitamin D deficiency    Vitamin B12 deficiency        Past Surgical History:   Procedure Laterality Date    BACK SURGERY  2017    T7 spinal cord stimulator with Dr. Angel Milligan      breast reconstruction w/implants    COLONOSCOPY      COSMETIC SURGERY      jaw surgery    ENDOSCOPY, COLON, DIAGNOSTIC      HYSTERECTOMY (CERVIX STATUS UNKNOWN)      complete    MASTECTOMY Bilateral ,     left and right    OTHER SURGICAL HISTORY N/A 2017    stage 1  5 day spinal cord stimulator trial mo9 ([employer])    SKIN BIOPSY      moles    TONSILLECTOMY          Family History   Problem Relation Age of Onset    No Known Problems Mother     No Known Problems Father         Social History     Tobacco Use    Smoking status: Former     Packs/day: 1.00     Years: 12.00     Pack years: 12.00     Types: Cigarettes     Start date: 1965     Quit date: 1974     Years since quittin.6    Smokeless tobacco: Never   Vaping Use    Vaping Use: Never used   Substance Use Topics    Alcohol use: No    Drug use: Yes     Comment: Norco, Lorazepam        Objective  Vitals:    23 1301   BP: 122/76   Pulse: 73   Temp: (!) 96.6 °F (35.9 °C)   SpO2: 93%   Weight: 135 lb (61.2 kg) Height: 5' 5\" (1.651 m)        Exam:  Physical Exam  Vitals reviewed. Exam conducted with a chaperone present. Constitutional:       General: She is not in acute distress. Appearance: She is well-developed. She is not ill-appearing. Comments: Thin   HENT:      Head: Normocephalic and atraumatic. Right Ear: External ear normal.      Left Ear: External ear normal.      Mouth/Throat:      Pharynx: No oropharyngeal exudate. Eyes:      General: No scleral icterus. Right eye: No discharge. Left eye: No discharge. Conjunctiva/sclera: Conjunctivae normal.      Pupils: Pupils are equal, round, and reactive to light. Neck:      Thyroid: No thyromegaly. Cardiovascular:      Rate and Rhythm: Normal rate and regular rhythm. Heart sounds: Normal heart sounds. No murmur heard. No friction rub. No gallop. Pulmonary:      Effort: Pulmonary effort is normal. No respiratory distress. Breath sounds: Normal breath sounds. No wheezing or rales. Chest:      Chest wall: No tenderness. Abdominal:      General: Bowel sounds are normal. There is no distension. Palpations: Abdomen is soft. There is no mass. Tenderness: There is no abdominal tenderness. There is no guarding or rebound. Musculoskeletal:         General: Tenderness present. No deformity. Normal range of motion. Cervical back: Normal range of motion and neck supple. No rigidity. Lymphadenopathy:      Cervical: No cervical adenopathy. Skin:     General: Skin is warm and dry. Coloration: Skin is not pale. Findings: No erythema or rash. Neurological:      Mental Status: She is alert and oriented to person, place, and time. Cranial Nerves: No cranial nerve deficit. Sensory: No sensory deficit.       Deep Tendon Reflexes: Reflexes normal.      Comments: Intention tremor   Psychiatric:         Mood and Affect: Mood normal.         Behavior: Behavior normal.         Thought Content: Thought content normal.         Judgment: Judgment normal.      Comments: Mood is sad        Last labs reviewed. Problem List          Circulatory    Essential hypertension - Primary       at goal.  Continue lisinopril 20 mg daily. Monitor blood pressures. Prescription given for lisinopril. Check fasting CMP         Relevant Medications    lisinopril (PRINIVIL;ZESTRIL) 20 MG tablet    Other Relevant Orders    Comprehensive Metabolic Panel (Completed)       Digestive    Gastroesophageal reflux disease       patient off medications. Because she is to be started on low-dose NSAID I will ask her to take famotidine 20 mg daily which she can increase to twice daily as needed to protect against worsening of GERD. If she develops any of symptoms suggestive of worsening GERD they are to stop the NSAID's and inform me. Prescription given for famotidine         Relevant Medications    senna-docusate (PERICOLACE) 8.6-50 MG per tablet    famotidine (PEPCID) 20 MG tablet       Nervous and Auditory    Essential tremor       improved. Her clonazepam has helped this and she will continue on this under the direction of her psychiatrist            Musculoskeletal and Integument    Osteoporosis       she was given IV Zometa instead of Reclast at the [facility]. She continues her vitamin D and calcium. We will await recommendations from her oral surgeon regarding teeth extraction while on Zometa         Relevant Medications    vitamin D (ERGOCALCIFEROL) 1.25 MG (66754 UT) CAPS capsule       Genitourinary    Stage 3 chronic kidney disease ([address]       not at goal.  We will try ibuprofen 400 mg in between her morning and noon dose of narcotic in between her noon and evening dose of her narcotic. Take with food and watch for any GI upset. We will monitor her kidney functions closely. If any sign of decrease we will have to discontinue the ibuprofen.          Relevant Orders    Comprehensive Metabolic Panel (Completed) CBC with Auto Differential (Completed)       Other    Vitamin D deficiency       check 25-hydroxy vitamin D level         Relevant Orders    Vitamin D 25 Hydroxy (Completed)    Vitamin B12 deficiency       check vitamin B12 level as she was taken off of it when her levels went  too high         Relevant Orders    Vitamin B12 (Completed)    Depression       not at goal but better. Continue follow-up with her psychiatrist and her psychotropic medications. Check CBC and CMP         Relevant Medications    mirtazapine (REMERON) 30 MG tablet    amitriptyline (ELAVIL) 50 MG tablet    sertraline (ZOLOFT) 100 MG tablet    ARIPiprazole (ABILIFY) 5 MG tablet    clonazePAM (KLONOPIN) 1 MG tablet    Chronic pain       continue with pain clinic. Continue her Norco.  Trial of  NSAID's as directed         Relevant Medications    HYDROcodone-acetaminophen (NORCO)  MG per tablet    aspirin 81 MG EC tablet    mirtazapine (REMERON) 30 MG tablet    amitriptyline (ELAVIL) 50 MG tablet    sertraline (ZOLOFT) 100 MG tablet    clonazePAM (KLONOPIN) 1 MG tablet    ibuprofen (ADVIL;MOTRIN) 400 MG tablet    Hyperlipidemia     Not at goal.  Continue simvastatin 20 mg daily and watch diet check fasting lipids. Last total cholesterol was 244 and LDL was 147. Triglycerides also elevated         Relevant Medications    aspirin 81 MG EC tablet    simvastatin (ZOCOR) 20 MG tablet    lisinopril (PRINIVIL;ZESTRIL) 20 MG tablet    Other Relevant Orders    Lipid Panel (Completed)    Comprehensive Metabolic Panel (Completed)                Follow-up:  I will see her in 3 months.         Electronically signed    Xuan Monterroso DO

## 2023-01-31 NOTE — ASSESSMENT & PLAN NOTE
Not at goal.  Continue simvastatin 20 mg daily and watch diet check fasting lipids. Last total cholesterol was 244 and LDL was 147.   Triglycerides also elevated

## 2023-01-31 NOTE — ASSESSMENT & PLAN NOTE
she was given IV Zometa instead of Reclast at the [facility]. She continues her vitamin D and calcium.   We will await recommendations from her oral surgeon regarding teeth extraction while on Zometa

## 2023-01-31 NOTE — ASSESSMENT & PLAN NOTE
improved.   Her clonazepam has helped this and she will continue on this under the direction of her psychiatrist

## 2023-01-31 NOTE — ASSESSMENT & PLAN NOTE
not at goal but better. Continue follow-up with her psychiatrist and her psychotropic medications.   Check CBC and CMP

## 2023-01-31 NOTE — ASSESSMENT & PLAN NOTE
at goal.  Continue lisinopril 20 mg daily. Monitor blood pressures. Prescription given for lisinopril.   Check fasting CMP

## 2023-01-31 NOTE — ASSESSMENT & PLAN NOTE
not at goal.  We will try ibuprofen 400 mg in between her morning and noon dose of narcotic in between her noon and evening dose of her narcotic. Take with food and watch for any GI upset. We will monitor her kidney functions closely. If any sign of decrease we will have to discontinue the ibuprofen.

## 2023-01-31 NOTE — ASSESSMENT & PLAN NOTE
patient off medications. Because she is to be started on low-dose NSAID I will ask her to take famotidine 20 mg daily which she can increase to twice daily as needed to protect against worsening of GERD. If she develops any of symptoms suggestive of worsening GERD they are to stop the NSAID's and inform me.   Prescription given for famotidine

## 2023-04-03 ENCOUNTER — TELEPHONE (OUTPATIENT)
Dept: PODIATRY | Age: 78
End: 2023-04-03

## 2023-04-03 NOTE — TELEPHONE ENCOUNTER
LMOM letting them know that podiatry unable to get a return call through to them. I advised that he call back to get her scheduled.

## 2023-04-03 NOTE — TELEPHONE ENCOUNTER
Patient called and left a msg requesting to sched an apt. Patient has a smart call blocker on her phone which does not accept calls from the office and I am unable to leave a msg. If patient would like to sched with podiatry please have her contact our office again or have PCP office schedule for her.  Thank you

## 2023-04-06 ENCOUNTER — OFFICE VISIT (OUTPATIENT)
Dept: FAMILY MEDICINE CLINIC | Age: 78
End: 2023-04-06
Payer: MEDICARE

## 2023-04-06 VITALS
HEART RATE: 92 BPM | TEMPERATURE: 98.1 F | OXYGEN SATURATION: 99 % | BODY MASS INDEX: 22.49 KG/M2 | WEIGHT: 135 LBS | SYSTOLIC BLOOD PRESSURE: 102 MMHG | HEIGHT: 65 IN | RESPIRATION RATE: 18 BRPM | DIASTOLIC BLOOD PRESSURE: 72 MMHG

## 2023-04-06 DIAGNOSIS — R30.0 DYSURIA: ICD-10-CM

## 2023-04-06 DIAGNOSIS — R39.11 URINARY HESITANCY: Primary | ICD-10-CM

## 2023-04-06 DIAGNOSIS — R35.0 INCREASED URINARY FREQUENCY: ICD-10-CM

## 2023-04-06 LAB
BILIRUBIN, POC: NORMAL
BLOOD URINE, POC: NORMAL
CLARITY, POC: CLEAR
COLOR, POC: NORMAL
GLUCOSE URINE, POC: NORMAL
KETONES, POC: NORMAL
LEUKOCYTE EST, POC: NORMAL
NITRITE, POC: NORMAL
PH, POC: 5
PROTEIN, POC: NORMAL
SPECIFIC GRAVITY, POC: 1.03
UROBILINOGEN, POC: 0.2

## 2023-04-06 PROCEDURE — 1036F TOBACCO NON-USER: CPT

## 2023-04-06 PROCEDURE — 1090F PRES/ABSN URINE INCON ASSESS: CPT

## 2023-04-06 PROCEDURE — 3078F DIAST BP <80 MM HG: CPT

## 2023-04-06 PROCEDURE — 81002 URINALYSIS NONAUTO W/O SCOPE: CPT

## 2023-04-06 PROCEDURE — 99213 OFFICE O/P EST LOW 20 MIN: CPT

## 2023-04-06 PROCEDURE — G8399 PT W/DXA RESULTS DOCUMENT: HCPCS

## 2023-04-06 PROCEDURE — 3074F SYST BP LT 130 MM HG: CPT

## 2023-04-06 PROCEDURE — G8420 CALC BMI NORM PARAMETERS: HCPCS

## 2023-04-06 PROCEDURE — G8427 DOCREV CUR MEDS BY ELIG CLIN: HCPCS

## 2023-04-06 PROCEDURE — 1123F ACP DISCUSS/DSCN MKR DOCD: CPT

## 2023-04-06 NOTE — PROGRESS NOTES
included cigarettes. She started smoking about 57 years ago. She has a 12.00 pack-year smoking history. She has never used smokeless tobacco. She reports current drug use. She reports that she does not drink alcohol. Family History: family history includes No Known Problems in her father and mother. Allergies: Epinephrine and Adhesive tape    Physical Exam   Vital Signs:  /72   Pulse 92   Temp 98.1 °F (36.7 °C) (Temporal)   Resp 18   Ht 5' 5\" (1.651 m)   Wt 135 lb (61.2 kg)   SpO2 99%   BMI 22.47 kg/m²    Oxygen Saturation Interpretation: Normal.    Constitutional:  A&Ox3, development consistent with age, NAD. Lungs:  CTAB without wheezing, rales, or rhonchi. Heart:  RRR without pathologic murmurs, rubs, or gallops. Abdomen: Soft, nondistended, with mild suprapubic tenderness. No rebound, rigidity, or guarding. BS+ X4. No organomegaly. Back: Negative CVA tenderness. Skin:  Normal turgor. Warm, dry, without visible rash, unless noted elsewhere. Neurological:  Alert and oriented. Motor functions intact. Responds to verbal commands. Test Results Section   (All laboratory and radiology results have been personally reviewed by myself)  Labs:  Results for orders placed or performed in visit on 04/06/23   POCT Urinalysis no Micro   Result Value Ref Range    Color, UA maddie     Clarity, UA clear     Glucose, UA POC neg     Bilirubin, UA neg     Ketones, UA trace     Spec Grav, UA 1.030     Blood, UA POC neg     pH, UA 5.0     Protein, UA POC neg     Urobilinogen, UA 0.2     Leukocytes, UA neg     Nitrite, UA neg        Imaging: All Radiology results interpreted by Radiologist unless otherwise noted. No results found. Medical Decision Making   Patient is well appearing, non toxic and appropriate for outpatient management. Plan is for symptom management and PCP follow up. Assessment / Plan   Impression(s):  Omayra Kingsley was seen today for urinary retention and urinary frequency.     Diagnoses

## 2023-04-08 LAB — BACTERIA UR CULT: NORMAL

## 2023-04-26 ENCOUNTER — TELEPHONE (OUTPATIENT)
Dept: PRIMARY CARE CLINIC | Age: 78
End: 2023-04-26

## 2023-04-26 NOTE — TELEPHONE ENCOUNTER
Pt called in stated she may not make it in today because she is have Bowel issues. She stated she did not know if you would want to see her or not and then stated if she can make it she will but does not want you to be upset if she cant because of her bowel issues.

## 2023-04-26 NOTE — TELEPHONE ENCOUNTER
Call patient and tell her to stay home and not come in today. She can try and come in sometime next week or so.

## 2023-05-01 NOTE — ASSESSMENT & PLAN NOTE
Monitor. Avoid NSAID use.   Check intact PTH [facility] Counseling:  I discussed with the patient the risks of imiquimod including but not limited to erythema, scaling, itching, weeping, crusting, and pain.  Patient understands that the inflammatory response to imiquimod is variable from person to person and was educated regarded proper titration schedule.  If flu-like symptoms develop, patient knows to discontinue the medication and contact us.

## 2023-05-02 ENCOUNTER — OFFICE VISIT (OUTPATIENT)
Dept: PRIMARY CARE CLINIC | Age: 78
End: 2023-05-02
Payer: MEDICARE

## 2023-05-02 VITALS
HEIGHT: 65 IN | OXYGEN SATURATION: 98 % | DIASTOLIC BLOOD PRESSURE: 63 MMHG | BODY MASS INDEX: 23.66 KG/M2 | SYSTOLIC BLOOD PRESSURE: 122 MMHG | WEIGHT: 142 LBS | TEMPERATURE: 97.2 F | HEART RATE: 111 BPM

## 2023-05-02 DIAGNOSIS — E78.2 MIXED HYPERLIPIDEMIA: ICD-10-CM

## 2023-05-02 DIAGNOSIS — R73.9 HYPERGLYCEMIA: ICD-10-CM

## 2023-05-02 DIAGNOSIS — E55.9 VITAMIN D DEFICIENCY: ICD-10-CM

## 2023-05-02 DIAGNOSIS — I10 ESSENTIAL HYPERTENSION: Primary | ICD-10-CM

## 2023-05-02 DIAGNOSIS — K59.03 DRUG-INDUCED CONSTIPATION: ICD-10-CM

## 2023-05-02 DIAGNOSIS — M81.0 OSTEOPOROSIS WITHOUT CURRENT PATHOLOGICAL FRACTURE, UNSPECIFIED OSTEOPOROSIS TYPE: ICD-10-CM

## 2023-05-02 DIAGNOSIS — K21.9 GASTROESOPHAGEAL REFLUX DISEASE, UNSPECIFIED WHETHER ESOPHAGITIS PRESENT: ICD-10-CM

## 2023-05-02 DIAGNOSIS — F99 INSOMNIA DUE TO OTHER MENTAL DISORDER: ICD-10-CM

## 2023-05-02 DIAGNOSIS — R11.2 NON-INTRACTABLE VOMITING WITH NAUSEA: ICD-10-CM

## 2023-05-02 DIAGNOSIS — G89.4 CHRONIC PAIN SYNDROME: ICD-10-CM

## 2023-05-02 DIAGNOSIS — F33.2 SEVERE EPISODE OF RECURRENT MAJOR DEPRESSIVE DISORDER, WITHOUT PSYCHOTIC FEATURES (HCC): ICD-10-CM

## 2023-05-02 DIAGNOSIS — F51.05 INSOMNIA DUE TO OTHER MENTAL DISORDER: ICD-10-CM

## 2023-05-02 DIAGNOSIS — E53.8 VITAMIN B12 DEFICIENCY: ICD-10-CM

## 2023-05-02 PROCEDURE — 1090F PRES/ABSN URINE INCON ASSESS: CPT | Performed by: INTERNAL MEDICINE

## 2023-05-02 PROCEDURE — G8399 PT W/DXA RESULTS DOCUMENT: HCPCS | Performed by: INTERNAL MEDICINE

## 2023-05-02 PROCEDURE — G8420 CALC BMI NORM PARAMETERS: HCPCS | Performed by: INTERNAL MEDICINE

## 2023-05-02 PROCEDURE — 3074F SYST BP LT 130 MM HG: CPT | Performed by: INTERNAL MEDICINE

## 2023-05-02 PROCEDURE — 3078F DIAST BP <80 MM HG: CPT | Performed by: INTERNAL MEDICINE

## 2023-05-02 PROCEDURE — 1036F TOBACCO NON-USER: CPT | Performed by: INTERNAL MEDICINE

## 2023-05-02 PROCEDURE — G8427 DOCREV CUR MEDS BY ELIG CLIN: HCPCS | Performed by: INTERNAL MEDICINE

## 2023-05-02 PROCEDURE — 99214 OFFICE O/P EST MOD 30 MIN: CPT | Performed by: INTERNAL MEDICINE

## 2023-05-02 PROCEDURE — 1123F ACP DISCUSS/DSCN MKR DOCD: CPT | Performed by: INTERNAL MEDICINE

## 2023-05-02 RX ORDER — AMITRIPTYLINE HYDROCHLORIDE 25 MG/1
TABLET, FILM COATED ORAL
Qty: 90 TABLET | Refills: 1 | Status: SHIPPED | OUTPATIENT
Start: 2023-05-02

## 2023-05-02 RX ORDER — AMITRIPTYLINE HYDROCHLORIDE 25 MG/1
TABLET, FILM COATED ORAL
Qty: 90 TABLET | Refills: 1 | Status: SHIPPED
Start: 2023-05-02 | End: 2023-05-02

## 2023-05-02 RX ORDER — CHLORHEXIDINE GLUCONATE 0.12 MG/ML
RINSE ORAL
COMMUNITY
Start: 2023-04-19

## 2023-05-02 RX ORDER — ERGOCALCIFEROL 1.25 MG/1
50000 CAPSULE ORAL
Qty: 6 CAPSULE | Refills: 2 | Status: SHIPPED | OUTPATIENT
Start: 2023-05-02

## 2023-05-02 RX ORDER — IBUPROFEN 400 MG/1
400 TABLET ORAL 2 TIMES DAILY PRN
Qty: 60 TABLET | Refills: 3 | Status: SHIPPED | OUTPATIENT
Start: 2023-05-02

## 2023-05-02 RX ORDER — AMITRIPTYLINE HYDROCHLORIDE 50 MG/1
50 TABLET, FILM COATED ORAL NIGHTLY
Qty: 90 TABLET | Refills: 1 | Status: SHIPPED | OUTPATIENT
Start: 2023-05-02 | End: 2023-05-02

## 2023-05-02 RX ORDER — AMOXICILLIN 250 MG
2 CAPSULE ORAL DAILY PRN
Qty: 60 TABLET | Refills: 5 | Status: SHIPPED | OUTPATIENT
Start: 2023-05-02

## 2023-05-02 RX ORDER — FAMOTIDINE 20 MG/1
20 TABLET, FILM COATED ORAL DAILY
Qty: 30 TABLET | Refills: 3 | Status: SHIPPED | OUTPATIENT
Start: 2023-05-02

## 2023-05-02 RX ORDER — NALOXONE HYDROCHLORIDE 4 MG/.1ML
SPRAY NASAL
COMMUNITY
Start: 2019-02-13

## 2023-05-02 RX ORDER — LISINOPRIL 20 MG/1
20 TABLET ORAL DAILY
Qty: 90 TABLET | Refills: 1 | Status: SHIPPED | OUTPATIENT
Start: 2023-05-02

## 2023-05-02 RX ORDER — PROMETHAZINE HYDROCHLORIDE 25 MG/1
TABLET ORAL
Qty: 60 TABLET | Refills: 3 | Status: SHIPPED | OUTPATIENT
Start: 2023-05-02

## 2023-05-02 RX ORDER — SIMVASTATIN 20 MG
20 TABLET ORAL NIGHTLY
Qty: 90 TABLET | Refills: 1 | Status: SHIPPED | OUTPATIENT
Start: 2023-05-02

## 2023-05-02 RX ORDER — LORAZEPAM 1 MG/1
1 TABLET ORAL 3 TIMES DAILY PRN
COMMUNITY
Start: 2023-04-02

## 2023-05-02 SDOH — ECONOMIC STABILITY: HOUSING INSECURITY
IN THE LAST 12 MONTHS, WAS THERE A TIME WHEN YOU DID NOT HAVE A STEADY PLACE TO SLEEP OR SLEPT IN A SHELTER (INCLUDING NOW)?: NO

## 2023-05-02 SDOH — ECONOMIC STABILITY: INCOME INSECURITY: HOW HARD IS IT FOR YOU TO PAY FOR THE VERY BASICS LIKE FOOD, HOUSING, MEDICAL CARE, AND HEATING?: NOT HARD AT ALL

## 2023-05-02 SDOH — ECONOMIC STABILITY: FOOD INSECURITY: WITHIN THE PAST 12 MONTHS, YOU WORRIED THAT YOUR FOOD WOULD RUN OUT BEFORE YOU GOT MONEY TO BUY MORE.: NEVER TRUE

## 2023-05-02 SDOH — ECONOMIC STABILITY: FOOD INSECURITY: WITHIN THE PAST 12 MONTHS, THE FOOD YOU BOUGHT JUST DIDN'T LAST AND YOU DIDN'T HAVE MONEY TO GET MORE.: NEVER TRUE

## 2023-05-02 ASSESSMENT — ENCOUNTER SYMPTOMS
ROS SKIN COMMENTS: DRY
EYE ITCHING: 0
FACIAL SWELLING: 0
EYE DISCHARGE: 0
SHORTNESS OF BREATH: 0
DIARRHEA: 0
RHINORRHEA: 0
TROUBLE SWALLOWING: 0
EYE PAIN: 0
ANAL BLEEDING: 0
SORE THROAT: 0
WHEEZING: 0
COLOR CHANGE: 0
ABDOMINAL PAIN: 0
BLOOD IN STOOL: 0
VOMITING: 0
BACK PAIN: 1
CONSTIPATION: 1
NAUSEA: 0
COUGH: 0
PHOTOPHOBIA: 0
STRIDOR: 0

## 2023-05-02 NOTE — PROGRESS NOTES
5/3/2023    Name: Nunu Talbot : 1945 Sex: female  Age: 76 y.o. Subjective:  Chief Complaint   Patient presents with    Medication Check    Hypertension        Hypertension  Pertinent negatives include no chest pain, headaches, palpitations or shortness of breath. GERD     Patient is here accompanied by her     She saw Dr. Skye Snider an oral surgeon and had some teeth extracted. She tolerated this well. Pain management told her that if she could take some NSAID's in between her hydrocodone doses she might get better relief from her pain. She has a history of chronic kidney disease stage III A so if she takes NSAIDs. We will have to watch that very carefully. Patient and her  are aware of the risk of for worsening renal function on NSAID's. They are willing to try anything because the pain is getting away from her again. .    She has seen neurosurgeons in the past but she would like a referral to spine surgeons at [facility] in the future    Her new psychiatrist started her on Abilify which was increased to 5  mg . They have not worsened her constipation which is chronic. She continues on her lorazepam, mirtazapine, sertraline and amitriptyline. She follows up with a nurse practitioner Ms. Dong who takes care of her medications. She is with [facility] in [place]. She was started on amitriptyline by Dr Davey Fischer a sleep specialist felt that it would keep her from going into REM sleep. And this might help her chronic insomnia. Patient states she feels so much better on this, she gets a full night sleep and has no side effects of dry mouth dizziness. .     .  She  has dysphagia which apparently was worse after GI stretched her esophagus. She went to see Dr. Siria Minor. Reviewed his report. He did not feel she needed a repeat colonoscopy. He recommended continuing with a single small volume saline enema for relief of her chronic narcotic induced constipation.   She

## 2023-05-03 PROBLEM — Z01.818 ENCOUNTER FOR PRE-OPERATIVE EXAMINATION: Status: RESOLVED | Noted: 2021-10-07 | Resolved: 2023-05-03

## 2023-05-03 NOTE — ASSESSMENT & PLAN NOTE
check 25-hydroxy vitamin D D level in future. In the meantime continue her 50,000 units vitamin D2 every 2 weeks.   Prescription given for vitamin D2 50,000 units

## 2023-05-03 NOTE — ASSESSMENT & PLAN NOTE
not at goal, total cholesterol 213 and LDL cholesterol 122. Triglycerides were 173 this is on simvastatin 20 mg.   We will check lipid profile again may have to adjust simvastatin dose

## 2023-05-03 NOTE — ASSESSMENT & PLAN NOTE
continue what ever she needs treat her constipation to include MiraLAX, fiber supplements, Dulcolax and enemas. She will never be able to get rid of this while she is on chronic opiate therapy.

## 2023-05-03 NOTE — ASSESSMENT & PLAN NOTE
recommend try cut back on her amitriptyline to 25 mg a day.   We will give her a prescription for the 25 mg amitriptyline

## 2023-06-07 ENCOUNTER — OFFICE VISIT (OUTPATIENT)
Dept: PODIATRY | Age: 78
End: 2023-06-07
Payer: MEDICARE

## 2023-06-07 DIAGNOSIS — M79.671 BILATERAL FOOT PAIN: Primary | ICD-10-CM

## 2023-06-07 DIAGNOSIS — M79.672 BILATERAL FOOT PAIN: Primary | ICD-10-CM

## 2023-06-07 DIAGNOSIS — M20.42 HAMMER TOES OF BOTH FEET: ICD-10-CM

## 2023-06-07 DIAGNOSIS — M21.619 BUNION OF GREAT TOE: ICD-10-CM

## 2023-06-07 DIAGNOSIS — M20.41 HAMMER TOES OF BOTH FEET: ICD-10-CM

## 2023-06-07 PROCEDURE — 1036F TOBACCO NON-USER: CPT | Performed by: PODIATRIST

## 2023-06-07 PROCEDURE — G8399 PT W/DXA RESULTS DOCUMENT: HCPCS | Performed by: PODIATRIST

## 2023-06-07 PROCEDURE — 1090F PRES/ABSN URINE INCON ASSESS: CPT | Performed by: PODIATRIST

## 2023-06-07 PROCEDURE — 99204 OFFICE O/P NEW MOD 45 MIN: CPT | Performed by: PODIATRIST

## 2023-06-07 PROCEDURE — G8420 CALC BMI NORM PARAMETERS: HCPCS | Performed by: PODIATRIST

## 2023-06-07 PROCEDURE — 1123F ACP DISCUSS/DSCN MKR DOCD: CPT | Performed by: PODIATRIST

## 2023-06-07 PROCEDURE — G8427 DOCREV CUR MEDS BY ELIG CLIN: HCPCS | Performed by: PODIATRIST

## 2023-06-07 NOTE — PROGRESS NOTES
New patient in office with c/o left foot pain from great toe and second toe overlapping. Lakesha Kemp DO last seen 2023.       23  Gordon Ranks : 1945 Sex: female  Age: 76 y.o. Patient was referred by Lakesha Kemp DO    CC:    History of bunion left and right  History hammertoes both feet    HPI:   This pleasant 68-year-old female patient referred to me today with her  for history of bunion left and right foot with hammertoes on both feet. Denies recent injury or trauma. Does have occasional tenderness at the second toe but denies any pain of her bunion today. Does ask about surgical options going forward. No recent formal treatment or therapy. Does have narrow walking shoes on today. No recent radiographs. No history of custom inserts. No additional pedal complaints at this time. ROS:  Const: Denies constitutional symptoms  Musculo: Denies symptoms other than stated above  Skin: Denies symptoms other than stated above       Current Outpatient Medications:     chlorhexidine (PERIDEX) 0.12 % solution, SWISH AND SPIT 15 ML (SEE LID) BY MOUTH TWICE DAILY UNTIL GONE, Disp: , Rfl:     LORazepam (ATIVAN) 1 MG tablet, Take 1 tablet by mouth 3 times daily as needed. , Disp: , Rfl:     naloxone 4 MG/0.1ML LIQD nasal spray, as directed Nasally as needed for opioid overdose emergency for 1 day, Disp: , Rfl:     vitamin D (ERGOCALCIFEROL) 1.25 MG (26997 UT) CAPS capsule, Take 1 capsule by mouth every 14 days, Disp: 6 capsule, Rfl: 2    lisinopril (PRINIVIL;ZESTRIL) 20 MG tablet, Take 1 tablet by mouth daily, Disp: 90 tablet, Rfl: 1    promethazine (PHENERGAN) 25 MG tablet, TAKE 1 TABLET BY MOUTH TWICE DAILY, Disp: 60 tablet, Rfl: 3    ibuprofen (ADVIL;MOTRIN) 400 MG tablet, Take 1 tablet by mouth 2 times daily as needed for Pain, Disp: 60 tablet, Rfl: 3    famotidine (PEPCID) 20 MG tablet, Take 1 tablet by mouth daily, Disp: 30 tablet, Rfl: 3    simvastatin (ZOCOR) 20 MG

## 2023-06-13 ENCOUNTER — TELEPHONE (OUTPATIENT)
Dept: PRIMARY CARE CLINIC | Age: 78
End: 2023-06-13

## 2023-06-13 DIAGNOSIS — R30.0 DYSURIA: ICD-10-CM

## 2023-06-13 DIAGNOSIS — R30.0 DYSURIA: Primary | ICD-10-CM

## 2023-06-13 LAB
BACTERIA URNS QL MICRO: ABNORMAL /HPF
BILIRUB UR QL STRIP: NEGATIVE
CLARITY UR: ABNORMAL
COLOR UR: YELLOW
CRYSTALS URNS MICRO: ABNORMAL /HPF
EPI CELLS #/AREA URNS HPF: ABNORMAL /HPF
GLUCOSE UR STRIP-MCNC: NEGATIVE MG/DL
HGB UR QL STRIP: ABNORMAL
KETONES UR STRIP-MCNC: NEGATIVE MG/DL
LEUKOCYTE ESTERASE UR QL STRIP: ABNORMAL
NITRITE UR QL STRIP: NEGATIVE
PH UR STRIP: 6 [PH] (ref 5–9)
PROT UR STRIP-MCNC: NEGATIVE MG/DL
RBC #/AREA URNS HPF: ABNORMAL /HPF (ref 0–2)
SP GR UR STRIP: 1.02 (ref 1–1.03)
UROBILINOGEN UR STRIP-ACNC: 0.2 E.U./DL
WBC #/AREA URNS HPF: ABNORMAL /HPF (ref 0–5)

## 2023-06-13 NOTE — TELEPHONE ENCOUNTER
Urine was dropped off. There wasn't a lot in the container, so we sent it with both orders to the lab.

## 2023-06-13 NOTE — TELEPHONE ENCOUNTER
Patient called and states that she went to [facility] ED yesterday bc of dysuria. She said they collected a sample and said it showed 'a little bit of something' in it but were going to send it out. She said they will let her 'know in a few weeks' what the result was. She said they did not treat her with anything. She said today it is burning and asked if she could bring a urine here. I advised that she would need to use a specimen container and not a recycled container. She said she could have her  come pick one up if you will order it.

## 2023-06-15 LAB — BACTERIA UR CULT: NORMAL

## 2023-07-19 ENCOUNTER — OFFICE VISIT (OUTPATIENT)
Dept: PODIATRY | Age: 78
End: 2023-07-19

## 2023-07-19 VITALS — BODY MASS INDEX: 23.66 KG/M2 | WEIGHT: 142 LBS | HEIGHT: 65 IN

## 2023-07-19 DIAGNOSIS — M21.619 BUNION OF GREAT TOE: ICD-10-CM

## 2023-07-19 DIAGNOSIS — M79.672 BILATERAL FOOT PAIN: Primary | ICD-10-CM

## 2023-07-19 DIAGNOSIS — M20.42 HAMMER TOES OF BOTH FEET: ICD-10-CM

## 2023-07-19 DIAGNOSIS — M20.41 HAMMER TOES OF BOTH FEET: ICD-10-CM

## 2023-07-19 DIAGNOSIS — M19.079 ARTHRITIS OF METATARSOPHALANGEAL (MTP) JOINT OF GREAT TOE: ICD-10-CM

## 2023-07-19 DIAGNOSIS — M79.671 BILATERAL FOOT PAIN: Primary | ICD-10-CM

## 2023-07-19 NOTE — PROGRESS NOTES
bilateral great toe. No pain overlying bunion today. Crossover hammertoe noted. No pain overlying hammertoes. Dermatology examination:    No open skin lesions or abrasions bilateral lower extremity. Assessment and Plan:  Jose Sanders was seen today for follow-up and foot pain. 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 Follow-up arthritis first metatarsophalangeal joint bilateral.    Radiographs from 6/23/2023 bilateral foot from [facility].  Bilateral feet:          1. Mild joint space narrowing of the first metatarsal phalangeal joint bilaterally with slight     bony spurring most likely osteoarthritic in nature. 20 degress halux valgus deformity of the left     first metatarsophalangeal joint. No significant pain I did recommend offloading padding. Avoid narrow shoes. I would recommend conservative treatment and avoiding surgery at this time she does have difficulty weightbearing as it is. Surgically would likely need fusion of great toe and hammertoe correction which would require 6 weeks nonweightbearing. I did recommend conservative treatment several times.  in agreement of this. Follow-up from conservative standpoint 3 months. Return in about 3 months (around 10/19/2023). Seen By:  Elizabeth Harkins DPM      Document was created using voice recognition software. Note was reviewed, however may contain grammatical errors.

## 2023-08-02 ENCOUNTER — OFFICE VISIT (OUTPATIENT)
Dept: PRIMARY CARE CLINIC | Age: 78
End: 2023-08-02
Payer: MEDICARE

## 2023-08-02 VITALS
SYSTOLIC BLOOD PRESSURE: 118 MMHG | HEIGHT: 65 IN | HEART RATE: 92 BPM | TEMPERATURE: 97.7 F | DIASTOLIC BLOOD PRESSURE: 64 MMHG | BODY MASS INDEX: 23.63 KG/M2 | OXYGEN SATURATION: 91 %

## 2023-08-02 DIAGNOSIS — M54.6 PAIN IN THORACIC SPINE: ICD-10-CM

## 2023-08-02 DIAGNOSIS — I10 ESSENTIAL HYPERTENSION: Primary | ICD-10-CM

## 2023-08-02 DIAGNOSIS — K21.9 GASTROESOPHAGEAL REFLUX DISEASE, UNSPECIFIED WHETHER ESOPHAGITIS PRESENT: ICD-10-CM

## 2023-08-02 DIAGNOSIS — R35.0 URINARY FREQUENCY: ICD-10-CM

## 2023-08-02 DIAGNOSIS — R26.2 TROUBLE WALKING: ICD-10-CM

## 2023-08-02 DIAGNOSIS — R35.1 NOCTURIA: ICD-10-CM

## 2023-08-02 PROCEDURE — 1090F PRES/ABSN URINE INCON ASSESS: CPT | Performed by: INTERNAL MEDICINE

## 2023-08-02 PROCEDURE — 3074F SYST BP LT 130 MM HG: CPT | Performed by: INTERNAL MEDICINE

## 2023-08-02 PROCEDURE — 99214 OFFICE O/P EST MOD 30 MIN: CPT | Performed by: INTERNAL MEDICINE

## 2023-08-02 PROCEDURE — 3078F DIAST BP <80 MM HG: CPT | Performed by: INTERNAL MEDICINE

## 2023-08-02 PROCEDURE — G8399 PT W/DXA RESULTS DOCUMENT: HCPCS | Performed by: INTERNAL MEDICINE

## 2023-08-02 PROCEDURE — G8420 CALC BMI NORM PARAMETERS: HCPCS | Performed by: INTERNAL MEDICINE

## 2023-08-02 PROCEDURE — G8427 DOCREV CUR MEDS BY ELIG CLIN: HCPCS | Performed by: INTERNAL MEDICINE

## 2023-08-02 PROCEDURE — 1036F TOBACCO NON-USER: CPT | Performed by: INTERNAL MEDICINE

## 2023-08-02 PROCEDURE — 1123F ACP DISCUSS/DSCN MKR DOCD: CPT | Performed by: INTERNAL MEDICINE

## 2023-08-02 ASSESSMENT — ENCOUNTER SYMPTOMS
EYE ITCHING: 0
SORE THROAT: 0
STRIDOR: 0
SHORTNESS OF BREATH: 0
TROUBLE SWALLOWING: 0
NAUSEA: 0
EYE PAIN: 0
ANAL BLEEDING: 0
ROS SKIN COMMENTS: DRY
EYE DISCHARGE: 0
COLOR CHANGE: 0
PHOTOPHOBIA: 0
VOMITING: 0
ABDOMINAL PAIN: 0
COUGH: 0
CONSTIPATION: 1
RHINORRHEA: 0
DIARRHEA: 0
BLOOD IN STOOL: 0
FACIAL SWELLING: 0
WHEEZING: 0
BACK PAIN: 1

## 2023-08-02 NOTE — PROGRESS NOTES
to walk as far to the bathroom. It takes her quite a bit of time to walk to the bathroom and she sometimes leaks more. She is on Abilify  5  mg daily. This have not worsened her constipation   She continues on her lorazepam, mirtazapine, sertraline and amitriptyline. She follows up with a nurse practitioner Ms. Dong who takes care of her medications. She is with [facility] in [place]. She was started on amitriptyline by Dr Antonino Garner a sleep specialist felt that it would keep her from going into REM sleep. And this might help her chronic insomnia. Patient states she feels so much better on this, she gets a full night sleep and has no side effects of dizziness. .     .  She  has dysphagia which apparently was worse after GI stretched her esophagus. She went to see Dr. Loan Stephenson. Reviewed his report. He did not feel she needed a repeat colonoscopy. He recommended continuing with a single small volume saline enema for relief of her chronic narcotic induced constipation. She also takes Metamucil and MiraLAX daily. And continues on Dulcolax suppository as needed. Despite this she still has chronic constipation. She was tried on Linzess but this was too expensive. She does not remember trying Amitiza although this was noted on a GI consult this year    Dr. Freire Valentines did a panendoscopy on her in July of last year. Colonoscopy showed 2 polyps which apparently were \"precancerous\" and repeat scope will be scheduled in 2 years. She also had sigmoid diverticulosis. The EGD showed severe reflux esophagitis. She is off her PPI. Her  says her chronic constipation is the most bothersome thing in her life. 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 She tried medical marijuana but it caused her to become confused and so she stopped it. .      She had a ultrasound of her thyroid to monitor her multinodular goiter.   This showed a larger nodule and this

## 2023-08-02 NOTE — PATIENT INSTRUCTIONS
Get Bedside Commode from [facility]  Physical therapy at [address]

## 2023-08-03 NOTE — ASSESSMENT & PLAN NOTE
talked about physical therapy. We will send to [facility] physical therapy for leg strengthening gait training exercises.   She shuffles when she walks

## 2023-08-03 NOTE — ASSESSMENT & PLAN NOTE
try to drain her bladder by sitting on the commode every 2 hours during the day and then increasing intervals. Do not drink fluids within 4 hours of bedtime. Bedside commode to help with her difficulty with traveling to the bathroom as she has nocturia 3-4 times a night.

## 2023-08-07 DIAGNOSIS — I10 ESSENTIAL HYPERTENSION: ICD-10-CM

## 2023-08-07 DIAGNOSIS — E78.2 MIXED HYPERLIPIDEMIA: ICD-10-CM

## 2023-08-07 DIAGNOSIS — R73.9 HYPERGLYCEMIA: ICD-10-CM

## 2023-08-07 DIAGNOSIS — E53.8 VITAMIN B12 DEFICIENCY: ICD-10-CM

## 2023-08-07 LAB
ALBUMIN SERPL-MCNC: 5 G/DL (ref 3.5–5.2)
ALP BLD-CCNC: 50 U/L (ref 35–104)
ALT SERPL-CCNC: 13 U/L (ref 0–32)
ANION GAP SERPL CALCULATED.3IONS-SCNC: 14 MMOL/L (ref 7–16)
AST SERPL-CCNC: 25 U/L (ref 0–31)
BILIRUB SERPL-MCNC: 0.4 MG/DL (ref 0–1.2)
BUN BLDV-MCNC: 22 MG/DL (ref 6–23)
CALCIUM SERPL-MCNC: 10 MG/DL (ref 8.6–10.2)
CHLORIDE BLD-SCNC: 98 MMOL/L (ref 98–107)
CHOLESTEROL: 193 MG/DL
CO2: 25 MMOL/L (ref 22–29)
CREAT SERPL-MCNC: 1.1 MG/DL (ref 0.5–1)
GFR SERPL CREATININE-BSD FRML MDRD: 51 ML/MIN/1.73M2
GLUCOSE BLD-MCNC: 108 MG/DL (ref 74–99)
HBA1C MFR BLD: 5.9 % (ref 4–5.6)
HDLC SERPL-MCNC: 68 MG/DL
LDL CHOLESTEROL: 98 MG/DL
POTASSIUM SERPL-SCNC: 4.3 MMOL/L (ref 3.5–5)
SODIUM BLD-SCNC: 137 MMOL/L (ref 132–146)
TOTAL PROTEIN: 7.9 G/DL (ref 6.4–8.3)
TRIGL SERPL-MCNC: 137 MG/DL
VITAMIN B-12: 704 PG/ML (ref 211–946)
VLDLC SERPL CALC-MCNC: 27 MG/DL

## 2023-09-13 ENCOUNTER — TELEPHONE (OUTPATIENT)
Dept: PRIMARY CARE CLINIC | Age: 78
End: 2023-09-13

## 2023-09-13 NOTE — TELEPHONE ENCOUNTER
called and said that he is trying to get Dayana Bragg in to [facility] in [place]. He said that they need her latest labs faxed to their office. He did not have a fax number for them but gave me the phone number. I have tried calling multiple times yesterday and today to get someone on the line for a fax number and have not been successful. I just left a message on their machine asking them to call here to give us their fax number so I can send these labs, or I asked them to fax us their number.

## 2023-10-10 ENCOUNTER — NURSE ONLY (OUTPATIENT)
Dept: PRIMARY CARE CLINIC | Age: 78
End: 2023-10-10
Payer: MEDICARE

## 2023-10-10 PROCEDURE — 90694 VACC AIIV4 NO PRSRV 0.5ML IM: CPT | Performed by: INTERNAL MEDICINE

## 2023-10-10 PROCEDURE — G0008 ADMIN INFLUENZA VIRUS VAC: HCPCS | Performed by: INTERNAL MEDICINE

## 2023-10-24 NOTE — ASSESSMENT & PLAN NOTE
Watch diet and will monitor lipids in the future Elidel Counseling: Patient may experience a mild burning sensation during topical application. Elidel is not approved in children less than 2 years of age. There have been case reports of hematologic and skin malignancies in patients using topical calcineurin inhibitors although causality is questionable.

## 2023-10-30 DIAGNOSIS — F33.2 SEVERE EPISODE OF RECURRENT MAJOR DEPRESSIVE DISORDER, WITHOUT PSYCHOTIC FEATURES (HCC): ICD-10-CM

## 2023-10-30 RX ORDER — AMITRIPTYLINE HYDROCHLORIDE 25 MG/1
TABLET, FILM COATED ORAL
Qty: 90 TABLET | Refills: 0 | Status: SHIPPED | OUTPATIENT
Start: 2023-10-30

## 2023-10-31 ENCOUNTER — TELEPHONE (OUTPATIENT)
Dept: PRIMARY CARE CLINIC | Age: 78
End: 2023-10-31

## 2024-01-02 ENCOUNTER — TELEPHONE (OUTPATIENT)
Dept: PRIMARY CARE CLINIC | Age: 79
End: 2024-01-02

## 2024-01-10 ENCOUNTER — OFFICE VISIT (OUTPATIENT)
Dept: PRIMARY CARE CLINIC | Age: 79
End: 2024-01-10

## 2024-01-10 VITALS
BODY MASS INDEX: 21.83 KG/M2 | SYSTOLIC BLOOD PRESSURE: 110 MMHG | DIASTOLIC BLOOD PRESSURE: 58 MMHG | WEIGHT: 131 LBS | OXYGEN SATURATION: 92 % | RESPIRATION RATE: 18 BRPM | HEIGHT: 65 IN | TEMPERATURE: 96.9 F | HEART RATE: 97 BPM

## 2024-01-10 DIAGNOSIS — M79.672 CHRONIC PAIN OF BOTH FEET: ICD-10-CM

## 2024-01-10 DIAGNOSIS — M21.962 FOOT DEFORMITY, BILATERAL: ICD-10-CM

## 2024-01-10 DIAGNOSIS — I10 ESSENTIAL HYPERTENSION: ICD-10-CM

## 2024-01-10 DIAGNOSIS — M21.961 FOOT DEFORMITY, BILATERAL: ICD-10-CM

## 2024-01-10 DIAGNOSIS — D64.9 NORMOCYTIC ANEMIA: ICD-10-CM

## 2024-01-10 DIAGNOSIS — Z09 HOSPITAL DISCHARGE FOLLOW-UP: Primary | ICD-10-CM

## 2024-01-10 DIAGNOSIS — Z90.13 HISTORY OF BILATERAL MASTECTOMY: ICD-10-CM

## 2024-01-10 DIAGNOSIS — J18.9 PNEUMONIA OF LEFT LOWER LOBE DUE TO INFECTIOUS ORGANISM: ICD-10-CM

## 2024-01-10 DIAGNOSIS — N30.01 ACUTE CYSTITIS WITH HEMATURIA: ICD-10-CM

## 2024-01-10 DIAGNOSIS — Z76.89 ENCOUNTER TO ESTABLISH CARE WITH NEW DOCTOR: ICD-10-CM

## 2024-01-10 DIAGNOSIS — G89.29 CHRONIC PAIN OF BOTH FEET: ICD-10-CM

## 2024-01-10 DIAGNOSIS — Z85.3 HISTORY OF MALIGNANT NEOPLASM OF BREAST: ICD-10-CM

## 2024-01-10 DIAGNOSIS — Z12.31 SCREENING MAMMOGRAM FOR BREAST CANCER: ICD-10-CM

## 2024-01-10 DIAGNOSIS — M79.671 CHRONIC PAIN OF BOTH FEET: ICD-10-CM

## 2024-01-10 PROBLEM — R73.03 PRE-DIABETES: Status: ACTIVE | Noted: 2020-09-22

## 2024-01-10 PROBLEM — G20.A1 PARKINSON'S DISEASE WITHOUT DYSKINESIA OR FLUCTUATING MANIFESTATIONS: Status: ACTIVE | Noted: 2022-07-21

## 2024-01-10 PROBLEM — E04.2 MULTINODULAR THYROID: Status: ACTIVE | Noted: 2019-05-19

## 2024-01-10 PROBLEM — N18.2 STAGE 2 CHRONIC KIDNEY DISEASE: Status: ACTIVE | Noted: 2019-10-23

## 2024-01-10 RX ORDER — LISINOPRIL 10 MG/1
10 TABLET ORAL DAILY
Qty: 90 TABLET | Refills: 1 | Status: SHIPPED | OUTPATIENT
Start: 2024-01-10

## 2024-01-10 NOTE — PROGRESS NOTES
NEW PRIMARY CARE VISIT    1/10/24  Name: Yamilka Taylor   : 1945   Age: 78 y.o.  Sex: female        Assessment & Plan:       ICD-10-CM    1. Hospital discharge follow-up  Z09       2. Encounter to establish care with new doctor  Z76.89       3. Acute cystitis with hematuria  N30.01       4. Pneumonia of left lower lobe due to infectious organism  J18.9 guaiFENesin (ROBITUSSIN) 100 MG/5ML syrup      5. Normocytic anemia  D64.9       6. Essential hypertension  I10 lisinopril (PRINIVIL;ZESTRIL) 10 MG tablet      7. Chronic pain of both feet  M79.671 External Referral To Podiatry    G89.29     M79.672       8. Foot deformity, bilateral  M21.961 External Referral To Podiatry    M21.962       9. History of malignant neoplasm of breast  Z85.3 JOSÉ MIGUEL STEVEN DIGITAL SCREEN BILATERAL PER PROTOCOL      10. History of bilateral mastectomy  Z90.13 JOSÉ MIGUEL STEVEN DIGITAL SCREEN BILATERAL PER PROTOCOL      11. Screening mammogram for breast cancer  Z12.31 JOSÉ MIGUEL TSEVEN DIGITAL SCREEN BILATERAL PER PROTOCOL        Patient seen today for hospital follow-up for UTI and pneumonia. New patient history also reviewed and updated. Completed antibiotics. Symptoms resolved with exception of cough. Cardiopulmonary exam normal today. Provided guaifenesin for cough.    Mild chronic normocytic anemia noted on hospital labs. Possibly related to mild kidney disease vs deficiencies. Declined labs today. Check labs next visit.    Hypertension over-controlled today and at several recent visits. Reduce lisinopril to 10 mg daily.    Counseled patient regarding above diagnosis, including possible risks and complications, especially if left uncontrolled.  Counseled patient as appropriate and relevant regarding any possible side effects, risks, and alternatives to treatment; the patient verbalizes understanding, and is in agreement with the plan as detailed above.   All educational materials and instructions were discussed and included on the After

## 2024-01-12 ENCOUNTER — TELEPHONE (OUTPATIENT)
Dept: PRIMARY CARE CLINIC | Age: 79
End: 2024-01-12
Payer: MEDICARE

## 2024-01-12 DIAGNOSIS — R30.0 DYSURIA: Primary | ICD-10-CM

## 2024-01-12 DIAGNOSIS — N30.01 ACUTE CYSTITIS WITH HEMATURIA: Primary | ICD-10-CM

## 2024-01-12 DIAGNOSIS — N30.01 ACUTE CYSTITIS WITH HEMATURIA: ICD-10-CM

## 2024-01-12 LAB
BILIRUBIN, POC: NORMAL
BLOOD URINE, POC: NORMAL
CLARITY, POC: CLEAR
COLOR, POC: YELLOW
GLUCOSE URINE, POC: NORMAL
KETONES, POC: NORMAL
LEUKOCYTE EST, POC: NORMAL
NITRITE, POC: NORMAL
PH, POC: 6
PROTEIN, POC: NORMAL
SPECIFIC GRAVITY, POC: >=1.033
UROBILINOGEN, POC: NORMAL

## 2024-01-12 PROCEDURE — 81002 URINALYSIS NONAUTO W/O SCOPE: CPT | Performed by: STUDENT IN AN ORGANIZED HEALTH CARE EDUCATION/TRAINING PROGRAM

## 2024-01-12 NOTE — TELEPHONE ENCOUNTER
I apologize, I did not look at the orders before testing the pt's urine.   I did a POCT urine, and I will send the culture.

## 2024-01-12 NOTE — TELEPHONE ENCOUNTER
Pt's  called in stating the pt finished abx for UTI a couple days ago, she was feeling better, but now she is unable to urinate despite drinking lots of water.     Per discussion with Dr Shaw, pt is to come in to leave a urine sample.     Pt's  will get her ready and bring her in.

## 2024-01-14 LAB
CULTURE: ABNORMAL
SPECIMEN DESCRIPTION: ABNORMAL

## 2024-01-15 ENCOUNTER — TELEPHONE (OUTPATIENT)
Dept: PRIMARY CARE CLINIC | Age: 79
End: 2024-01-15

## 2024-01-15 NOTE — TELEPHONE ENCOUNTER
Last Appointment:  1/10/2024  Future Appointments   Date Time Provider Department Center   4/22/2024  1:30 PM Robert Shaw MD [place] [facility]   4/22/2024  2:00 PM Robert Shaw MD [place] [facility]      Need 01/10/2024 note for referral.    Electronically signed by Kiara Castrejon LPN on 1/15/2024 at 7:55 AM

## 2024-01-28 PROBLEM — D64.9 NORMOCYTIC ANEMIA: Status: ACTIVE | Noted: 2024-01-28

## 2024-01-28 PROBLEM — M79.672 CHRONIC PAIN OF BOTH FEET: Status: ACTIVE | Noted: 2024-01-28

## 2024-01-28 PROBLEM — M79.671 CHRONIC PAIN OF BOTH FEET: Status: ACTIVE | Noted: 2024-01-28

## 2024-01-28 PROBLEM — M21.962 FOOT DEFORMITY, BILATERAL: Status: ACTIVE | Noted: 2024-01-28

## 2024-01-28 PROBLEM — M21.961 FOOT DEFORMITY, BILATERAL: Status: ACTIVE | Noted: 2024-01-28

## 2024-01-28 PROBLEM — G89.29 CHRONIC PAIN OF BOTH FEET: Status: ACTIVE | Noted: 2024-01-28

## 2024-02-27 ENCOUNTER — TELEPHONE (OUTPATIENT)
Dept: PRIMARY CARE CLINIC | Age: 79
End: 2024-02-27

## 2024-02-27 DIAGNOSIS — R30.0 DYSURIA: Primary | ICD-10-CM

## 2024-02-27 DIAGNOSIS — R30.0 DYSURIA: ICD-10-CM

## 2024-02-27 LAB
BACTERIA: ABNORMAL
BILIRUBIN URINE: NEGATIVE
COLOR: YELLOW
EPITHELIAL CELLS UA: ABNORMAL /HPF
GLUCOSE URINE: NEGATIVE MG/DL
KETONES, URINE: NEGATIVE MG/DL
LEUKOCYTE ESTERASE, URINE: NEGATIVE
NITRITE, URINE: NEGATIVE
PH UA: 6 (ref 5–9)
PROTEIN UA: NEGATIVE MG/DL
RBC UA: ABNORMAL /HPF
SPECIFIC GRAVITY UA: 1.02 (ref 1–1.03)
TURBIDITY: CLEAR
URINE HGB: ABNORMAL
UROBILINOGEN, URINE: 0.2 EU/DL (ref 0–1)
WBC UA: ABNORMAL /HPF

## 2024-02-27 NOTE — TELEPHONE ENCOUNTER
Patient's  said he will come in to get a cup to try to collect a urine. He said it will be difficult bc her bladder 'locks up' when she gets in to the bathroom so holding a cup may not work. I advised he could request a hat to take home although that is not ideal, but it would be easier.   Orthopedic

## 2024-02-27 NOTE — TELEPHONE ENCOUNTER
Last Appointment:  1/10/2024  Future Appointments   Date Time Provider Department Center   4/22/2024  1:30 PM Robert Shaw MD [place] [facility]   4/22/2024  2:00 PM Robert Shaw MD [facility]      Patient's  called patient feels like she has UTI.  Asking if you would call something in for her.  Advised walk in care.   reports the difficulty it is to get her out of the house for him.    Electronically signed by Kiara Castrejon LPN on 2/27/2024 at 10:05 AM

## 2024-02-27 NOTE — TELEPHONE ENCOUNTER
Can he  urine cup to take home and bring back? Recommend culture for antibiotic sensitivities given several recent UTIs. Will order UA and urine culture.

## 2024-02-29 LAB
CULTURE: NORMAL
CULTURE: NORMAL
SPECIMEN DESCRIPTION: NORMAL

## 2024-03-29 ENCOUNTER — TELEPHONE (OUTPATIENT)
Dept: PRIMARY CARE CLINIC | Age: 79
End: 2024-03-29

## 2024-03-29 NOTE — TELEPHONE ENCOUNTER
[facility] didn't answer left  to call back    Routing to [place] in case he calls there and not call the number back I gave him

## 2024-03-29 NOTE — TELEPHONE ENCOUNTER
Spoke with patients  and he is trying to get a wheelchair for his wife for in the house because he is concerned she will fall with the walker.  He went to [facility] to get one but they told him she will need a face to face visit with the doctor. [facility] was asking if there is any way around that but I informed him insurance require face to face visits for these things. He verbalized understanding and is wondering if there is any way they can get in soon before 4/22 because he doesn't want to wait that long with his concern of her falling. He prefers the [place] office for appointment.

## 2024-03-29 NOTE — TELEPHONE ENCOUNTER
----- Message from Lewis Long sent at 3/29/2024 11:26 AM EDT -----  Subject: Message to Provider    QUESTIONS  Information for Provider? Patient ([facility]) was calling to speak   with doctor Colin diamond he said he have a question for her and would   like a call back.  ---------------------------------------------------------------------------  --------------  CALL BACK INFO  [phone]; OK to leave message on voicemail  ---------------------------------------------------------------------------  --------------  SCRIPT ANSWERS  Relationship to Patient? Spouse/Partner  Representative Name? [facility]  Is the representative on the Communication Release of Information (SANDY)   form in Epic? Yes

## 2024-04-22 DIAGNOSIS — M81.0 OSTEOPOROSIS WITHOUT CURRENT PATHOLOGICAL FRACTURE, UNSPECIFIED OSTEOPOROSIS TYPE: ICD-10-CM

## 2024-04-22 DIAGNOSIS — F33.2 SEVERE EPISODE OF RECURRENT MAJOR DEPRESSIVE DISORDER, WITHOUT PSYCHOTIC FEATURES (HCC): ICD-10-CM

## 2024-04-22 DIAGNOSIS — I10 ESSENTIAL HYPERTENSION: ICD-10-CM

## 2024-04-22 DIAGNOSIS — K59.03 DRUG-INDUCED CONSTIPATION: ICD-10-CM

## 2024-04-22 DIAGNOSIS — K21.9 GASTROESOPHAGEAL REFLUX DISEASE, UNSPECIFIED WHETHER ESOPHAGITIS PRESENT: ICD-10-CM

## 2024-04-22 DIAGNOSIS — E78.2 MIXED HYPERLIPIDEMIA: ICD-10-CM

## 2024-04-22 DIAGNOSIS — R11.2 NON-INTRACTABLE VOMITING WITH NAUSEA: ICD-10-CM

## 2024-04-22 RX ORDER — LISINOPRIL 10 MG/1
10 TABLET ORAL DAILY
Qty: 90 TABLET | Refills: 0 | Status: SHIPPED | OUTPATIENT
Start: 2024-04-22

## 2024-04-22 RX ORDER — FAMOTIDINE 20 MG/1
20 TABLET, FILM COATED ORAL DAILY
Qty: 90 TABLET | Refills: 0 | Status: SHIPPED | OUTPATIENT
Start: 2024-04-22

## 2024-04-22 RX ORDER — SIMVASTATIN 20 MG
20 TABLET ORAL NIGHTLY
Qty: 90 TABLET | Refills: 0 | Status: SHIPPED | OUTPATIENT
Start: 2024-04-22

## 2024-04-22 RX ORDER — AMOXICILLIN 250 MG
2 CAPSULE ORAL DAILY PRN
Qty: 180 TABLET | Refills: 0 | Status: SHIPPED | OUTPATIENT
Start: 2024-04-22

## 2024-04-22 RX ORDER — PROMETHAZINE HYDROCHLORIDE 25 MG/1
TABLET ORAL
Qty: 180 TABLET | Refills: 0 | Status: SHIPPED | OUTPATIENT
Start: 2024-04-22

## 2024-04-22 RX ORDER — ERGOCALCIFEROL 1.25 MG/1
50000 CAPSULE ORAL
Qty: 7 CAPSULE | Refills: 0 | Status: SHIPPED | OUTPATIENT
Start: 2024-04-22

## 2024-04-22 RX ORDER — AMITRIPTYLINE HYDROCHLORIDE 25 MG/1
TABLET, FILM COATED ORAL
Qty: 90 TABLET | Refills: 0 | Status: CANCELLED | OUTPATIENT
Start: 2024-04-22

## 2024-04-22 RX ORDER — ARIPIPRAZOLE 5 MG/1
5 TABLET ORAL DAILY
Qty: 90 TABLET | Refills: 0 | Status: CANCELLED | OUTPATIENT
Start: 2024-04-22

## 2024-04-22 NOTE — TELEPHONE ENCOUNTER
Pt is having diarrhea and is not coming to appointment but  stated that she needs refills on her medications.  He stated all of them.  He stated abilify is almost out.

## 2024-04-24 NOTE — TELEPHONE ENCOUNTER
Called left msg that you did send in medications you fill but they would have to call psychiatrist for the other medications she needed

## 2024-06-19 ENCOUNTER — OFFICE VISIT (OUTPATIENT)
Dept: PRIMARY CARE CLINIC | Age: 79
End: 2024-06-19
Payer: MEDICARE

## 2024-06-19 VITALS
SYSTOLIC BLOOD PRESSURE: 124 MMHG | RESPIRATION RATE: 20 BRPM | OXYGEN SATURATION: 98 % | HEIGHT: 65 IN | HEART RATE: 91 BPM | TEMPERATURE: 97.9 F | WEIGHT: 135 LBS | DIASTOLIC BLOOD PRESSURE: 66 MMHG | BODY MASS INDEX: 22.49 KG/M2

## 2024-06-19 VITALS
OXYGEN SATURATION: 98 % | WEIGHT: 135 LBS | SYSTOLIC BLOOD PRESSURE: 124 MMHG | HEIGHT: 65 IN | HEART RATE: 91 BPM | TEMPERATURE: 97.9 F | RESPIRATION RATE: 20 BRPM | BODY MASS INDEX: 22.49 KG/M2 | DIASTOLIC BLOOD PRESSURE: 66 MMHG

## 2024-06-19 DIAGNOSIS — E55.9 VITAMIN D DEFICIENCY: ICD-10-CM

## 2024-06-19 DIAGNOSIS — R73.03 PRE-DIABETES: ICD-10-CM

## 2024-06-19 DIAGNOSIS — M48.062 SPINAL STENOSIS OF LUMBAR REGION WITH NEUROGENIC CLAUDICATION: Chronic | ICD-10-CM

## 2024-06-19 DIAGNOSIS — E78.2 MIXED HYPERLIPIDEMIA: ICD-10-CM

## 2024-06-19 DIAGNOSIS — Z71.89 COUNSELING REGARDING ADVANCED DIRECTIVES AND GOALS OF CARE: ICD-10-CM

## 2024-06-19 DIAGNOSIS — Z00.00 MEDICARE ANNUAL WELLNESS VISIT, SUBSEQUENT: Primary | ICD-10-CM

## 2024-06-19 DIAGNOSIS — D64.9 NORMOCYTIC ANEMIA: ICD-10-CM

## 2024-06-19 DIAGNOSIS — K59.03 DRUG-INDUCED CONSTIPATION: ICD-10-CM

## 2024-06-19 DIAGNOSIS — F33.2 SEVERE EPISODE OF RECURRENT MAJOR DEPRESSIVE DISORDER, WITHOUT PSYCHOTIC FEATURES (HCC): ICD-10-CM

## 2024-06-19 DIAGNOSIS — I10 ESSENTIAL HYPERTENSION: ICD-10-CM

## 2024-06-19 DIAGNOSIS — R11.2 NON-INTRACTABLE VOMITING WITH NAUSEA: ICD-10-CM

## 2024-06-19 DIAGNOSIS — E53.8 VITAMIN B12 DEFICIENCY: ICD-10-CM

## 2024-06-19 DIAGNOSIS — G20.A1 PARKINSON'S DISEASE WITHOUT DYSKINESIA OR FLUCTUATING MANIFESTATIONS (HCC): Primary | ICD-10-CM

## 2024-06-19 DIAGNOSIS — K21.9 GASTROESOPHAGEAL REFLUX DISEASE, UNSPECIFIED WHETHER ESOPHAGITIS PRESENT: ICD-10-CM

## 2024-06-19 DIAGNOSIS — N39.0 RECURRENT UTI: ICD-10-CM

## 2024-06-19 LAB
BACTERIA: ABNORMAL
BILIRUBIN, POC: NEGATIVE
BILIRUBIN, URINE: NEGATIVE
BLOOD URINE, POC: ABNORMAL
CLARITY, POC: ABNORMAL
COLOR, POC: ABNORMAL
COLOR: YELLOW
FOLATE: >20 NG/ML (ref 4.8–24.2)
GLUCOSE URINE, POC: NEGATIVE
GLUCOSE URINE: NEGATIVE MG/DL
HBA1C MFR BLD: 6 % (ref 4–5.6)
HCT VFR BLD CALC: 41 % (ref 34–48)
HEMOGLOBIN: 12.9 G/DL (ref 11.5–15.5)
KETONES, POC: NEGATIVE
KETONES, URINE: NEGATIVE MG/DL
LEUKOCYTE EST, POC: ABNORMAL
LEUKOCYTE ESTERASE, URINE: ABNORMAL
MCH RBC QN AUTO: 29.1 PG (ref 26–35)
MCHC RBC AUTO-ENTMCNC: 31.5 G/DL (ref 32–34.5)
MCV RBC AUTO: 92.3 FL (ref 80–99.9)
NITRITE, POC: POSITIVE
NITRITE, URINE: NEGATIVE
PDW BLD-RTO: 14.6 % (ref 11.5–15)
PH, POC: 6
PH, URINE: 6 (ref 5–9)
PLATELET # BLD: 460 K/UL (ref 130–450)
PMV BLD AUTO: 9.5 FL (ref 7–12)
PROTEIN UA: 30 MG/DL
PROTEIN, POC: 100
RBC # BLD: 4.44 M/UL (ref 3.5–5.5)
RBC UA: ABNORMAL /HPF
SPECIFIC GRAVITY UA: >1.03 (ref 1–1.03)
SPECIFIC GRAVITY, POC: 1.03
TURBIDITY: ABNORMAL
URINE HGB: ABNORMAL
UROBILINOGEN, POC: 0.2
UROBILINOGEN, URINE: 0.2 EU/DL (ref 0–1)
VITAMIN B-12: 898 PG/ML (ref 211–946)
WBC # BLD: 9.4 K/UL (ref 4.5–11.5)
WBC UA: ABNORMAL /HPF

## 2024-06-19 PROCEDURE — G8427 DOCREV CUR MEDS BY ELIG CLIN: HCPCS | Performed by: STUDENT IN AN ORGANIZED HEALTH CARE EDUCATION/TRAINING PROGRAM

## 2024-06-19 PROCEDURE — 1123F ACP DISCUSS/DSCN MKR DOCD: CPT | Performed by: STUDENT IN AN ORGANIZED HEALTH CARE EDUCATION/TRAINING PROGRAM

## 2024-06-19 PROCEDURE — 99214 OFFICE O/P EST MOD 30 MIN: CPT | Performed by: STUDENT IN AN ORGANIZED HEALTH CARE EDUCATION/TRAINING PROGRAM

## 2024-06-19 PROCEDURE — G8420 CALC BMI NORM PARAMETERS: HCPCS | Performed by: STUDENT IN AN ORGANIZED HEALTH CARE EDUCATION/TRAINING PROGRAM

## 2024-06-19 PROCEDURE — G8399 PT W/DXA RESULTS DOCUMENT: HCPCS | Performed by: STUDENT IN AN ORGANIZED HEALTH CARE EDUCATION/TRAINING PROGRAM

## 2024-06-19 PROCEDURE — 1036F TOBACCO NON-USER: CPT | Performed by: STUDENT IN AN ORGANIZED HEALTH CARE EDUCATION/TRAINING PROGRAM

## 2024-06-19 PROCEDURE — G0439 PPPS, SUBSEQ VISIT: HCPCS | Performed by: STUDENT IN AN ORGANIZED HEALTH CARE EDUCATION/TRAINING PROGRAM

## 2024-06-19 PROCEDURE — 3074F SYST BP LT 130 MM HG: CPT | Performed by: STUDENT IN AN ORGANIZED HEALTH CARE EDUCATION/TRAINING PROGRAM

## 2024-06-19 PROCEDURE — 3078F DIAST BP <80 MM HG: CPT | Performed by: STUDENT IN AN ORGANIZED HEALTH CARE EDUCATION/TRAINING PROGRAM

## 2024-06-19 PROCEDURE — 1090F PRES/ABSN URINE INCON ASSESS: CPT | Performed by: STUDENT IN AN ORGANIZED HEALTH CARE EDUCATION/TRAINING PROGRAM

## 2024-06-19 PROCEDURE — 81002 URINALYSIS NONAUTO W/O SCOPE: CPT | Performed by: STUDENT IN AN ORGANIZED HEALTH CARE EDUCATION/TRAINING PROGRAM

## 2024-06-19 RX ORDER — AMOXICILLIN 250 MG
1 CAPSULE ORAL DAILY PRN
Qty: 90 TABLET | Refills: 1 | Status: SHIPPED | OUTPATIENT
Start: 2024-06-19

## 2024-06-19 RX ORDER — AMITRIPTYLINE HYDROCHLORIDE 25 MG/1
TABLET, FILM COATED ORAL
Qty: 90 TABLET | Refills: 1 | Status: SHIPPED | OUTPATIENT
Start: 2024-06-19

## 2024-06-19 RX ORDER — SIMVASTATIN 20 MG
20 TABLET ORAL NIGHTLY
Qty: 90 TABLET | Refills: 1 | Status: SHIPPED | OUTPATIENT
Start: 2024-06-19

## 2024-06-19 RX ORDER — LISINOPRIL 10 MG/1
10 TABLET ORAL DAILY
Qty: 90 TABLET | Refills: 1 | Status: SHIPPED | OUTPATIENT
Start: 2024-06-19

## 2024-06-19 RX ORDER — FAMOTIDINE 20 MG/1
20 TABLET, FILM COATED ORAL DAILY
Qty: 90 TABLET | Refills: 1 | Status: SHIPPED | OUTPATIENT
Start: 2024-06-19

## 2024-06-19 RX ORDER — PROMETHAZINE HYDROCHLORIDE 25 MG/1
25 TABLET ORAL 2 TIMES DAILY PRN
Qty: 180 TABLET | Refills: 1 | Status: SHIPPED | OUTPATIENT
Start: 2024-06-19

## 2024-06-19 ASSESSMENT — LIFESTYLE VARIABLES
HOW OFTEN DO YOU HAVE A DRINK CONTAINING ALCOHOL: NEVER
HOW MANY STANDARD DRINKS CONTAINING ALCOHOL DO YOU HAVE ON A TYPICAL DAY: PATIENT DOES NOT DRINK

## 2024-06-19 ASSESSMENT — PATIENT HEALTH QUESTIONNAIRE - PHQ9
9. THOUGHTS THAT YOU WOULD BE BETTER OFF DEAD, OR OF HURTING YOURSELF: SEVERAL DAYS
6. FEELING BAD ABOUT YOURSELF - OR THAT YOU ARE A FAILURE OR HAVE LET YOURSELF OR YOUR FAMILY DOWN: NEARLY EVERY DAY
4. FEELING TIRED OR HAVING LITTLE ENERGY: NEARLY EVERY DAY
8. MOVING OR SPEAKING SO SLOWLY THAT OTHER PEOPLE COULD HAVE NOTICED. OR THE OPPOSITE, BEING SO FIGETY OR RESTLESS THAT YOU HAVE BEEN MOVING AROUND A LOT MORE THAN USUAL: NOT AT ALL
SUM OF ALL RESPONSES TO PHQ QUESTIONS 1-9: 18
SUM OF ALL RESPONSES TO PHQ QUESTIONS 1-9: 19
7. TROUBLE CONCENTRATING ON THINGS, SUCH AS READING THE NEWSPAPER OR WATCHING TELEVISION: NEARLY EVERY DAY
5. POOR APPETITE OR OVEREATING: NOT AT ALL
SUM OF ALL RESPONSES TO PHQ QUESTIONS 1-9: 19
1. LITTLE INTEREST OR PLEASURE IN DOING THINGS: NEARLY EVERY DAY
3. TROUBLE FALLING OR STAYING ASLEEP: NEARLY EVERY DAY
2. FEELING DOWN, DEPRESSED OR HOPELESS: NEARLY EVERY DAY
10. IF YOU CHECKED OFF ANY PROBLEMS, HOW DIFFICULT HAVE THESE PROBLEMS MADE IT FOR YOU TO DO YOUR WORK, TAKE CARE OF THINGS AT HOME, OR GET ALONG WITH OTHER PEOPLE: NOT DIFFICULT AT ALL
SUM OF ALL RESPONSES TO PHQ QUESTIONS 1-9: 19
SUM OF ALL RESPONSES TO PHQ9 QUESTIONS 1 & 2: 6

## 2024-06-19 ASSESSMENT — ENCOUNTER SYMPTOMS
COUGH: 0
BACK PAIN: 1
SHORTNESS OF BREATH: 0
DIARRHEA: 1
CONSTIPATION: 1
WHEEZING: 0

## 2024-06-19 ASSESSMENT — COLUMBIA-SUICIDE SEVERITY RATING SCALE - C-SSRS
2. HAVE YOU ACTUALLY HAD ANY THOUGHTS OF KILLING YOURSELF?: NO
6. HAVE YOU EVER DONE ANYTHING, STARTED TO DO ANYTHING, OR PREPARED TO DO ANYTHING TO END YOUR LIFE?: NO
1. WITHIN THE PAST MONTH, HAVE YOU WISHED YOU WERE DEAD OR WISHED YOU COULD GO TO SLEEP AND NOT WAKE UP?: NO

## 2024-06-19 NOTE — PROGRESS NOTES
ESTABLISHED PRIMARY CARE VISIT    24  Name: Yamilka Taylor   : 1945   Age: 78 y.o.  Sex: female        Assessment & Plan:     Problem List Items Addressed This Visit       Spinal stenosis of lumbar region with neurogenic claudication     Follows with pain management  Continue current medications         Relevant Medications    amitriptyline (ELAVIL) 25 MG tablet    Other Relevant Orders    DME Order for Wheel Chair as OP    DME Order for Wheel Chair Cushion as OP    Severe episode of recurrent major depressive disorder, without psychotic features (HCC)     Chronic, stable  Follows with psychiatry  Continue current medications          Relevant Medications    amitriptyline (ELAVIL) 25 MG tablet    Gastroesophageal reflux disease     Continue famotidine 20 mg daily         Relevant Medications    famotidine (PEPCID) 20 MG tablet    senna-docusate (PERICOLACE) 8.6-50 MG per tablet    Hyperlipidemia     Continue simvastatin 20 mg nightly  Could consider stopping for primary prevention         Relevant Medications    lisinopril (PRINIVIL;ZESTRIL) 10 MG tablet    simvastatin (ZOCOR) 20 MG tablet    Other Relevant Orders    Comprehensive Metabolic Panel, Fasting    Lipid, Fasting    Essential hypertension     Chronic, controlled  Continue lisinopril 10 mg daily         Relevant Medications    lisinopril (PRINIVIL;ZESTRIL) 10 MG tablet    simvastatin (ZOCOR) 20 MG tablet    Other Relevant Orders    Comprehensive Metabolic Panel, Fasting    Drug-induced constipation     Continue laxatives- encouraged more consistent use to prevent constipation with overflow diarrhea         Relevant Medications    senna-docusate (PERICOLACE) 8.6-50 MG per tablet    Non-intractable vomiting with nausea     Continue phenergan as needed         Relevant Medications    promethazine (PHENERGAN) 25 MG tablet    Pre-diabetes     Recheck         Relevant Orders    Hemoglobin A1C    Comprehensive Metabolic Panel, Fasting

## 2024-06-19 NOTE — PATIENT INSTRUCTIONS
vitamins and minerals. High-fiber foods include whole-grain cereals and breads, oatmeal, beans, brown rice, citrus fruits, and apples.     Eat lean proteins. Heart-healthy proteins include seafood, lean meats and poultry, eggs, beans, peas, nuts, seeds, and soy products.     Limit drinks and foods with added sugar. These include candy, desserts, and soda pop.   Heart-healthy lifestyle    If your doctor recommends it, get more exercise. For many people, walking is a good choice. Or you may want to swim, bike, or do other activities. Bit by bit, increase the time you're active every day. Try for at least 30 minutes on most days of the week.     Try to quit or cut back on using tobacco and other nicotine products. This includes smoking and vaping. If you need help quitting, talk to your doctor about stop-smoking programs and medicines. These can increase your chances of quitting for good. Quitting is one of the most important things you can do to protect your heart. It is never too late to quit. Try to avoid secondhand smoke too.     Stay at a weight that's healthy for you. Talk to your doctor if you need help losing weight.     Try to get 7 to 9 hours of sleep each night.     Limit alcohol to 2 drinks a day for men and 1 drink a day for women. Too much alcohol can cause health problems.     Manage other health problems such as diabetes, high blood pressure, and high cholesterol. If you think you may have a problem with alcohol or drug use, talk to your doctor.   Medicines    Take your medicines exactly as prescribed. Call your doctor if you think you are having a problem with your medicine.     If your doctor recommends aspirin, take the amount directed each day. Make sure you take aspirin and not another kind of pain reliever, such as acetaminophen (Tylenol).   When should you call for help?   Call 911 if you have symptoms of a heart attack. These may include:    Chest pain or pressure, or a strange feeling in the

## 2024-06-19 NOTE — PROGRESS NOTES
Medicare Annual Wellness Visit    Yamilka aTylor is here for Medicare AWV    Assessment & Plan   Medicare annual wellness visit, subsequent  Counseling regarding advanced directives and goals of care    Recommendations for Preventive Services Due: see orders and patient instructions/AVS.  Recommended screening schedule for the next 5-10 years is provided to the patient in written form: see Patient Instructions/AVS.     Return for Medicare Annual Wellness Visit in 1 year.     Subjective   Patient's complete Health Risk Assessment and screening values have been reviewed and are found in Flowsheets. The following problems were reviewed today and where indicated follow up appointments were made and/or referrals ordered.    Positive Risk Factor Screenings with Interventions:  Fall Risk:  Do you feel unsteady or are you worried about falling? : (!) yes  2 or more falls in past year?: no  Fall with injury in past year?: no     Interventions:    Reviewed medications, home hazards, visual acuity, and co-morbidities that can increase risk for falls  Patient declines any further evaluation or treatment  Neurology referral for second opinion    Cognitive:   Clock Drawing Test (CDT): (!) Abnormal  Words recalled: 3 Words Recalled  Total Score: 3  Total Score Interpretation: Normal Mini-Cog  Interventions:  Unable to do CDT due to tremor    Depression:  PHQ-2 Score: 6  PHQ-9 Total Score: 19    Interpretation:  5-9 mild   10-14 moderate   15-19 moderately severe   20-27 severe   Interventions:  Monitored by specialist. No acute findings meriting change in the plan       Controlled Medication Review:  Follows with pain management    Today's Pain Level: No data recorded   Opioid Risk: (Low risk score <55) Opioid risk score: 23    Patient is low risk for opioid use disorder or overdose.    Last PDMP Dharmesh as Reviewed:  Review User Review Instant Review Result   DEEPTHI VASQUEZ 4/28/2022  4:01 PM     Reviewed PDMP [1]     Last

## 2024-06-20 LAB
ALBUMIN: 4.8 G/DL (ref 3.5–5.2)
ALP BLD-CCNC: 56 U/L (ref 35–104)
ALT SERPL-CCNC: 11 U/L (ref 0–32)
ANION GAP SERPL CALCULATED.3IONS-SCNC: 19 MMOL/L (ref 7–16)
AST SERPL-CCNC: 22 U/L (ref 0–31)
BILIRUB SERPL-MCNC: 0.3 MG/DL (ref 0–1.2)
BUN BLDV-MCNC: 23 MG/DL (ref 6–23)
CALCIUM SERPL-MCNC: 9.7 MG/DL (ref 8.6–10.2)
CHLORIDE BLD-SCNC: 99 MMOL/L (ref 98–107)
CHOLESTEROL, FASTING: 213 MG/DL
CO2: 23 MMOL/L (ref 22–29)
CREAT SERPL-MCNC: 1 MG/DL (ref 0.5–1)
FERRITIN: 95 NG/ML
GFR, ESTIMATED: 61 ML/MIN/1.73M2
GLUCOSE FASTING: 64 MG/DL (ref 74–99)
HDLC SERPL-MCNC: 76 MG/DL
IRON % SATURATION: 25 % (ref 15–50)
IRON: 92 UG/DL (ref 37–145)
LDL CHOLESTEROL: 106 MG/DL
POTASSIUM SERPL-SCNC: 4.9 MMOL/L (ref 3.5–5)
SODIUM BLD-SCNC: 141 MMOL/L (ref 132–146)
TOTAL IRON BINDING CAPACITY: 368 UG/DL (ref 250–450)
TOTAL PROTEIN: 7.8 G/DL (ref 6.4–8.3)
TRIGLYCERIDE, FASTING: 156 MG/DL
VITAMIN D 25-HYDROXY: 60.1 NG/ML (ref 30–100)
VLDLC SERPL CALC-MCNC: 31 MG/DL

## 2024-06-21 DIAGNOSIS — N39.0 RECURRENT UTI: Primary | ICD-10-CM

## 2024-06-21 RX ORDER — CEFDINIR 300 MG/1
300 CAPSULE ORAL 2 TIMES DAILY
Qty: 14 CAPSULE | Refills: 0 | Status: SHIPPED | OUTPATIENT
Start: 2024-06-21 | End: 2024-06-28

## 2024-06-22 LAB
CULTURE: ABNORMAL
CULTURE: ABNORMAL
SPECIMEN DESCRIPTION: ABNORMAL

## 2024-07-01 ENCOUNTER — TELEPHONE (OUTPATIENT)
Dept: PRIMARY CARE CLINIC | Age: 79
End: 2024-07-01

## 2024-07-01 NOTE — TELEPHONE ENCOUNTER
[facility] called and said they are asking again for updated note for getting a wheelchair. She said that  keeps calling there, but they can't proceed until they get the note updated. She said it must say that you are ordering the wheelchair to help with ADL's in the house and using a cane or walker will not be enough help for that.

## 2024-07-19 ENCOUNTER — TELEPHONE (OUTPATIENT)
Dept: PRIMARY CARE CLINIC | Age: 79
End: 2024-07-19

## 2024-07-19 DIAGNOSIS — N39.0 RECURRENT UTI: Primary | ICD-10-CM

## 2024-07-19 RX ORDER — CEFDINIR 300 MG/1
300 CAPSULE ORAL 2 TIMES DAILY
Qty: 14 CAPSULE | Refills: 0 | Status: SHIPPED | OUTPATIENT
Start: 2024-07-19 | End: 2024-07-26

## 2024-07-19 NOTE — TELEPHONE ENCOUNTER
is calling in requesting another prescription called in for patient. He said she thinks she is having UTI symptoms

## 2024-07-22 NOTE — TELEPHONE ENCOUNTER
Late entry. Also received after hours call on 7/19. Attempted to call 7/19 but did not reach them, left VM. Sent in cefdinir. Please make sure they received this.

## 2024-08-28 ENCOUNTER — TELEPHONE (OUTPATIENT)
Dept: PRIMARY CARE CLINIC | Age: 79
End: 2024-08-28

## 2024-08-28 NOTE — TELEPHONE ENCOUNTER
Spoke with pt. She states she went back to the surgeon about 2 weeks ago, and \"he ignored her.\" I asked what he said and did. She stated he examined her foot, stated, \"well, I guess we will have to cut it off.\" And the pt got up and left.     She states she went to the ER before going back to the surgeon. She states they wrapped it and told her to keep it wrapped.   Per the ER note, pt had been told to wear a post-op shoe, and she did not want to.

## 2024-09-15 NOTE — PATIENT INSTRUCTIONS
Ultrasound of thyroid and Dexa scan at [facility] will set up your oxygen for nightime use  F/U with Dr Alba Huang and Pain Management SCM

## 2024-09-16 ENCOUNTER — OFFICE VISIT (OUTPATIENT)
Dept: NEUROLOGY | Age: 79
End: 2024-09-16
Payer: MEDICARE

## 2024-09-16 VITALS
DIASTOLIC BLOOD PRESSURE: 59 MMHG | HEIGHT: 65 IN | RESPIRATION RATE: 18 BRPM | BODY MASS INDEX: 21.66 KG/M2 | SYSTOLIC BLOOD PRESSURE: 110 MMHG | TEMPERATURE: 98.2 F | HEART RATE: 98 BPM | OXYGEN SATURATION: 98 % | WEIGHT: 130 LBS

## 2024-09-16 DIAGNOSIS — G21.19 OTHER DRUG-INDUCED SECONDARY PARKINSONISM (HCC): Primary | ICD-10-CM

## 2024-09-16 PROCEDURE — 3074F SYST BP LT 130 MM HG: CPT | Performed by: NURSE PRACTITIONER

## 2024-09-16 PROCEDURE — 3078F DIAST BP <80 MM HG: CPT | Performed by: NURSE PRACTITIONER

## 2024-09-16 PROCEDURE — 1036F TOBACCO NON-USER: CPT | Performed by: NURSE PRACTITIONER

## 2024-09-16 PROCEDURE — 99204 OFFICE O/P NEW MOD 45 MIN: CPT | Performed by: NURSE PRACTITIONER

## 2024-09-16 PROCEDURE — 1090F PRES/ABSN URINE INCON ASSESS: CPT | Performed by: NURSE PRACTITIONER

## 2024-09-16 PROCEDURE — G8420 CALC BMI NORM PARAMETERS: HCPCS | Performed by: NURSE PRACTITIONER

## 2024-09-16 PROCEDURE — 1123F ACP DISCUSS/DSCN MKR DOCD: CPT | Performed by: NURSE PRACTITIONER

## 2024-09-16 PROCEDURE — G8427 DOCREV CUR MEDS BY ELIG CLIN: HCPCS | Performed by: NURSE PRACTITIONER

## 2024-09-16 PROCEDURE — G8399 PT W/DXA RESULTS DOCUMENT: HCPCS | Performed by: NURSE PRACTITIONER

## 2024-09-16 RX ORDER — AMANTADINE HYDROCHLORIDE 100 MG/1
100 CAPSULE, GELATIN COATED ORAL 2 TIMES DAILY
Qty: 60 CAPSULE | Refills: 1 | Status: SHIPPED | OUTPATIENT
Start: 2024-09-16

## 2024-09-18 ENCOUNTER — OFFICE VISIT (OUTPATIENT)
Dept: PRIMARY CARE CLINIC | Age: 79
End: 2024-09-18
Payer: MEDICARE

## 2024-09-18 VITALS
OXYGEN SATURATION: 96 % | WEIGHT: 135 LBS | HEART RATE: 105 BPM | RESPIRATION RATE: 20 BRPM | HEIGHT: 65 IN | DIASTOLIC BLOOD PRESSURE: 50 MMHG | BODY MASS INDEX: 22.49 KG/M2 | TEMPERATURE: 98 F | SYSTOLIC BLOOD PRESSURE: 98 MMHG

## 2024-09-18 DIAGNOSIS — K59.03 DRUG-INDUCED CONSTIPATION: ICD-10-CM

## 2024-09-18 DIAGNOSIS — Z23 NEED FOR IMMUNIZATION AGAINST INFLUENZA: ICD-10-CM

## 2024-09-18 DIAGNOSIS — E78.2 MIXED HYPERLIPIDEMIA: ICD-10-CM

## 2024-09-18 DIAGNOSIS — Z12.31 SCREENING MAMMOGRAM FOR HIGH-RISK PATIENT: ICD-10-CM

## 2024-09-18 DIAGNOSIS — M81.0 OSTEOPOROSIS WITHOUT CURRENT PATHOLOGICAL FRACTURE, UNSPECIFIED OSTEOPOROSIS TYPE: ICD-10-CM

## 2024-09-18 DIAGNOSIS — I10 ESSENTIAL HYPERTENSION: Primary | ICD-10-CM

## 2024-09-18 DIAGNOSIS — Z85.3 HISTORY OF MALIGNANT NEOPLASM OF BREAST: ICD-10-CM

## 2024-09-18 DIAGNOSIS — F33.2 SEVERE EPISODE OF RECURRENT MAJOR DEPRESSIVE DISORDER, WITHOUT PSYCHOTIC FEATURES (HCC): ICD-10-CM

## 2024-09-18 DIAGNOSIS — G21.19 PARKINSONISM DUE TO DRUG (HCC): ICD-10-CM

## 2024-09-18 DIAGNOSIS — M20.62 TOE DEFORMITY, ACQUIRED, LEFT: ICD-10-CM

## 2024-09-18 DIAGNOSIS — K21.9 GASTROESOPHAGEAL REFLUX DISEASE, UNSPECIFIED WHETHER ESOPHAGITIS PRESENT: ICD-10-CM

## 2024-09-18 PROCEDURE — G8427 DOCREV CUR MEDS BY ELIG CLIN: HCPCS | Performed by: STUDENT IN AN ORGANIZED HEALTH CARE EDUCATION/TRAINING PROGRAM

## 2024-09-18 PROCEDURE — 1036F TOBACCO NON-USER: CPT | Performed by: STUDENT IN AN ORGANIZED HEALTH CARE EDUCATION/TRAINING PROGRAM

## 2024-09-18 PROCEDURE — G0008 ADMIN INFLUENZA VIRUS VAC: HCPCS | Performed by: STUDENT IN AN ORGANIZED HEALTH CARE EDUCATION/TRAINING PROGRAM

## 2024-09-18 PROCEDURE — G8399 PT W/DXA RESULTS DOCUMENT: HCPCS | Performed by: STUDENT IN AN ORGANIZED HEALTH CARE EDUCATION/TRAINING PROGRAM

## 2024-09-18 PROCEDURE — 1123F ACP DISCUSS/DSCN MKR DOCD: CPT | Performed by: STUDENT IN AN ORGANIZED HEALTH CARE EDUCATION/TRAINING PROGRAM

## 2024-09-18 PROCEDURE — 3074F SYST BP LT 130 MM HG: CPT | Performed by: STUDENT IN AN ORGANIZED HEALTH CARE EDUCATION/TRAINING PROGRAM

## 2024-09-18 PROCEDURE — 99214 OFFICE O/P EST MOD 30 MIN: CPT | Performed by: STUDENT IN AN ORGANIZED HEALTH CARE EDUCATION/TRAINING PROGRAM

## 2024-09-18 PROCEDURE — 1090F PRES/ABSN URINE INCON ASSESS: CPT | Performed by: STUDENT IN AN ORGANIZED HEALTH CARE EDUCATION/TRAINING PROGRAM

## 2024-09-18 PROCEDURE — G8420 CALC BMI NORM PARAMETERS: HCPCS | Performed by: STUDENT IN AN ORGANIZED HEALTH CARE EDUCATION/TRAINING PROGRAM

## 2024-09-18 PROCEDURE — 90653 IIV ADJUVANT VACCINE IM: CPT | Performed by: STUDENT IN AN ORGANIZED HEALTH CARE EDUCATION/TRAINING PROGRAM

## 2024-09-18 PROCEDURE — 3078F DIAST BP <80 MM HG: CPT | Performed by: STUDENT IN AN ORGANIZED HEALTH CARE EDUCATION/TRAINING PROGRAM

## 2024-09-18 RX ORDER — SIMVASTATIN 20 MG
20 TABLET ORAL NIGHTLY
Qty: 90 TABLET | Refills: 1 | Status: SHIPPED | OUTPATIENT
Start: 2024-09-18

## 2024-09-18 RX ORDER — LISINOPRIL 5 MG/1
5 TABLET ORAL DAILY
Qty: 90 TABLET | Refills: 1 | Status: SHIPPED | OUTPATIENT
Start: 2024-09-18

## 2024-09-18 RX ORDER — FAMOTIDINE 20 MG/1
20 TABLET, FILM COATED ORAL DAILY
Qty: 90 TABLET | Refills: 1 | Status: SHIPPED | OUTPATIENT
Start: 2024-09-18

## 2024-09-18 RX ORDER — AMOXICILLIN 250 MG
1 CAPSULE ORAL DAILY PRN
Qty: 90 TABLET | Refills: 1 | Status: SHIPPED | OUTPATIENT
Start: 2024-09-18

## 2024-09-18 RX ORDER — ERGOCALCIFEROL 1.25 MG/1
50000 CAPSULE, LIQUID FILLED ORAL
Qty: 7 CAPSULE | Refills: 1 | Status: SHIPPED | OUTPATIENT
Start: 2024-09-18

## 2024-09-18 ASSESSMENT — ENCOUNTER SYMPTOMS
SHORTNESS OF BREATH: 0
WHEEZING: 0
BACK PAIN: 1
CONSTIPATION: 1
COUGH: 0
DIARRHEA: 1

## 2024-10-02 ENCOUNTER — TELEPHONE (OUTPATIENT)
Dept: NEUROLOGY | Age: 79
End: 2024-10-02

## 2024-10-02 NOTE — TELEPHONE ENCOUNTER
PT spouse called to report improved symptoms with amantadine, will continue to use rx and will notify office of any change.

## 2024-10-29 NOTE — TELEPHONE ENCOUNTER
Last seen 9/16/2024  Next appt 12/9/2024    Requested Prescriptions     Pending Prescriptions Disp Refills    amantadine (SYMMETREL) 100 MG capsule 90 capsule 1     Sig: Take 1 capsule by mouth in the morning, at noon, and at bedtime

## 2024-10-30 RX ORDER — AMANTADINE HYDROCHLORIDE 100 MG/1
100 CAPSULE, GELATIN COATED ORAL 3 TIMES DAILY
Qty: 90 CAPSULE | Refills: 1 | OUTPATIENT
Start: 2024-10-30

## 2024-10-31 ENCOUNTER — TELEPHONE (OUTPATIENT)
Dept: PRIMARY CARE CLINIC | Age: 79
End: 2024-10-31

## 2024-10-31 NOTE — TELEPHONE ENCOUNTER
We talked about increasing it but then decided not to because of all of her other psychiatric medications. She should continue the 25 mg.

## 2024-10-31 NOTE — TELEPHONE ENCOUNTER
Pt's  called in asking about amitriptyline.   He states the dosage on the bottle is 25 mg, but someone scribbled it out and wrote 50 mg. He does not know who scribbled it out.   Per office note from May 2023, 50 mg was was cut back to 25 mg.     He is asking for confirmation that 25 mg is correct.

## 2024-11-05 RX ORDER — ZOLEDRONIC ACID 0.05 MG/ML
5 INJECTION, SOLUTION INTRAVENOUS
COMMUNITY

## 2024-11-13 NOTE — ASSESSMENT & PLAN NOTE
Avoid NSAID's and will monitor kidney functions OUTPATIENT PROGRESS NOTE    HISTORY  The patient is a 68 year old male who comes in for an acute visit.     Patient comes today with concerns about back pain. Patient reports that he started to have right lower back pain that radiated down hamstring to knee. Patient reports that the radiculopathy subsided but he continues to have right hip and right lower back pain.   Patient denies any trauma or strain.   Patient has history of back pain. Patient reports that he had injections, last injection was given 11/2016, was followed by DR Tierney.   No fever no chills. No bowel or urinary symptoms  Patient reports that it feels better to walk,   Patient denies any numbness or tingling to extremities  Patient has taken NSAIDS and topical creams with no relief.     MRI lumbar spine 2016  IMPRESSION:  No significant spinal stenosis with degenerative disc changes.  Focally significant left foraminal stenosis at L4-5.  Broad-based bulging on the right at L5-S1 probably impinges on the origin  of the S1 nerve root.  No bony fracture.    XR lumbar spine 9/2024  IMPRESSION:   1.  Progressive degenerative change at L3-4 and L4-5    MEDICATIONS  Medications were reviewed and updated today.  Current Outpatient Medications   Medication Sig    tizanidine (ZANAFLEX) 2 MG capsule Take 1 capsule by mouth 3 times daily as needed for Muscle spasms.    methylPREDNISolone (MEDROL DOSEPAK) 4 MG tablet See package instructions.    traMADol (ULTRAM) 50 MG tablet Take 1 tablet by mouth 2 times daily as needed for Pain.    SUMAtriptan (IMITREX) 50 MG tablet Take 1 tablet by mouth at onset of migraine. May repeat after 2 hours if needed.    celecoxib (CeleBREX) 200 MG capsule Take 1 capsule by mouth once daily    losartan (COZAAR) 25 MG tablet Take 1 tablet by mouth daily.    metoPROLOL succinate (TOPROL-XL) 50 MG 24 hr tablet Take 0.5 tablets by mouth daily.    amLODIPine (NORVASC) 10 MG tablet Take 1 tablet by mouth daily.    escitalopram  (LEXAPRO) 10 MG tablet Take 1 tablet by mouth nightly.    ERENumab-aooe (Aimovig) 140 MG/ML injection Inject 1 mL into the skin every 30 days.    hydroCHLOROthiazide 25 MG tablet Take 1 tablet by mouth once daily    promethazine (PHENERGAN) 25 MG tablet Take 1 tablet by mouth every 6 hours as needed for Nausea.    aspirin 325 MG tablet Take 325 mg by mouth daily. AND PRN    omeprazole (PRILOSEC) 40 MG capsule Take 1 capsule by mouth daily.    MAGNESIUM OXIDE PO Take 1,000 mg by mouth nightly.     aspirin-acetaminophen-caffeine (EXCEDRIN MIGRAINE) 250-250-65 MG per tablet Take 1 tablet by mouth every 6 hours as needed for Pain.     No current facility-administered medications for this visit.       ALLERGIES  Allergies as of 11/13/2024 - Reviewed 11/13/2024   Allergen Reaction Noted    Nortriptyline Other (See Comments) 05/08/2019    Pepto-bismol HIVES 07/04/2015    Verapamil CARDIAC DISTURBANCES 07/13/2020       REVIEW OF SYSTEMS  See above for details.   General: No fatigue. No fever. No chills.  Cardiac: No chest pain. No palpitation. No syncope or near syncope. No orthopnea or paroxysmal nocturnal dyspnea.  Respiratory: No cough. No wheezing. No hemoptysis. No shortness of breath.  Gastrointestinal: No nausea. No vomiting. No abdominal pain. No diarrhea. No rectal bleeding. No dysphagia.  Genitourinary: No hematuria. No flank pain. No dysuria. No irritative urinary symptoms. Normal stream.  Neurologic: No headache. No unilateral sensory or motor dysfunction. No dysarthria. No double vision.  Endocrine: No weight change. No dry skin. No palpitation. No hair loss. No constipation.  Skin: No rash, ulcers, or pruritus.  ENT: No runny nose. No sneezing. No nasal drainage.  Eyes: No drainage. No pain.  Mental: No anxiety or depression.  Hide right lower back pain.  Worse at night and in bed.  Better with walking.    PHYSICAL EXAM  Vitals: Blood pressure 130/80, pulse 73, temperature 97 °F (36.1 °C), temperature source  Temporal, resp. rate 16, height 6' 2\" (1.88 m), weight 92 kg (202 lb 14.4 oz), SpO2 98%.  Wt Readings from Last 3 Encounters:   11/13/24 92 kg (202 lb 14.4 oz)   09/10/24 89.5 kg (197 lb 4.8 oz)   07/23/24 91.3 kg (201 lb 3.2 oz)     .  General: Patient is alert, awake, oriented x3, not in acute distress, pleasant.  Neck: Neck supple. No lymphadenopathy. No jugular venous distention or bruits.  Cardiac: Regular rate and rhythm. No murmur, rubs or gallops. No extra sounds. Point of maximal impulse is normal.  Chest: Clear. No wheezing. No rales. Good air entry. No rhonchi.  Abdomen: Soft. Nontender. Nondistended. No hepatosplenomegaly.  Legs: Normal. No edema. No deep vein thrombosis.  Skin: Warm and dry with no rashes.  Stable gait.   Toe walking is normal.  Heel walking is normal.  Cervical, thoracic, lumbar spine palpation is normal with no central pain.  Straight leg is negative bilaterally.  Sensory examination of both legs is normal.  DTRs are normal and symmetric at the knee and the ankle level.  Skin is normal and warm.  No ischemic changes.  Calf is free of any tenderness.  No erythema, cellulitis or DVT.  Dorsiflexion is normal.  Skin with no vesicles or rash.  Pelvic pressure is negative for pain.  Bilateral trochanteric palpation negative for pain.     LABORATORY  I have reviewed the pertinent laboratory tests. These are the pertinent findings:  Lab Results   Component Value Date    HGBA1C 5.8 (H) 09/03/2024    CHOLESTEROL 214 (H) 09/03/2024    HDL 55 09/03/2024    CALCLDL 144 (H) 09/03/2024    TRIGLYCERIDE 75 09/03/2024    POTASSIUM 4.3 09/03/2024    CREATININE 1.00 09/03/2024    AST 24 09/03/2024    BILIRUBIN 0.5 09/03/2024    TSH 2.465 03/05/2020    WBC 4.0 (L) 09/03/2024    HGB 14.5 09/03/2024     09/03/2024    PSA 0.90 09/03/2024    VITD25 83.4 08/30/2022       ASSESSMENT:  1. Bulging lumbar disc         Walking seems to alleviate his pain, and he reports no numbness or tingling in his legs.  He has tried ibuprofen, aspirin, Instaflex, and topical creams, but these have not provided relief. His pain intensifies when he lies flat in bed, to the point where he often gets up to walk around at 3:00 AM. This has resulted in poor sleep quality over the past two weeks.       1. Back pain.  The patient's neurovascular examination yielded excellent results, with no apparent deficits. The absence of numbness in the leg suggests that a major disc issue is unlikely. He is advised to use heating pads, warm and cold compresses, and topicals such as Biofreeze and Icy Hot. He should avoid heavy lifting, pulling or pushing heavy objects, and excessive movements. Over-the-counter medications like Tylenol can be taken as needed. A Medrol Dosepak will be prescribed, along with tizanidine 2 mg (1 tablet three times a day as needed for muscle spasms), acetaminophen 500 mg (1 to 2 tablets up to three times a day as needed), and tramadol 50 mg (twice daily as needed for severe back pain). If there is no improvement in the next 1 to 2 weeks, he should contact for further recommendations, which may include physical therapy, dry needling, ultrasound treatment, and vibration tennis units.              Patient Instructions   Medrol Dosepak x 1  Tizanidine  2 mg 1 tablet 3 times a day as needed for muscle spasms caution about sedation number 30 no refills  Acetaminophen 500 mg 1 to 2 tablet tid as needed  Tramadol 50 Mg twice daily as needed #20 no refills for severe back pain.  Apply heating pads alternating with cool compresses ice packs as needed  Apply over-the-counter preparations such as IcyHot, Bengay or Aspercreme as needed  Avoid heavy lifting pulling or pushing heavy objects.  Call if no improvement for further recommendations    All the above was discussed with the patient in detail; questions were answered to the patient's satisfaction.  Patient left the office in stable condition with verbal and written  instructions.    Time spent preparing the chart , discussing and reviewing all the above medical problems, formulating a plan of action and provided recommendations, reviewing the medical records, imaging, labs, other healthcare providers documentation and discussing all the above with the patient was more than 25 minutes, more than half in counseling.

## 2024-12-09 ENCOUNTER — OFFICE VISIT (OUTPATIENT)
Dept: NEUROLOGY | Age: 79
End: 2024-12-09
Payer: MEDICARE

## 2024-12-09 VITALS
OXYGEN SATURATION: 95 % | DIASTOLIC BLOOD PRESSURE: 70 MMHG | WEIGHT: 135 LBS | SYSTOLIC BLOOD PRESSURE: 122 MMHG | BODY MASS INDEX: 22.49 KG/M2 | HEART RATE: 112 BPM | HEIGHT: 65 IN

## 2024-12-09 DIAGNOSIS — F03.B0 MODERATE DEMENTIA WITHOUT BEHAVIORAL DISTURBANCE, PSYCHOTIC DISTURBANCE, MOOD DISTURBANCE, OR ANXIETY, UNSPECIFIED DEMENTIA TYPE (HCC): ICD-10-CM

## 2024-12-09 DIAGNOSIS — G21.19 OTHER DRUG-INDUCED SECONDARY PARKINSONISM (HCC): Primary | ICD-10-CM

## 2024-12-09 PROCEDURE — 1160F RVW MEDS BY RX/DR IN RCRD: CPT | Performed by: NURSE PRACTITIONER

## 2024-12-09 PROCEDURE — 3078F DIAST BP <80 MM HG: CPT | Performed by: NURSE PRACTITIONER

## 2024-12-09 PROCEDURE — G8427 DOCREV CUR MEDS BY ELIG CLIN: HCPCS | Performed by: NURSE PRACTITIONER

## 2024-12-09 PROCEDURE — 1123F ACP DISCUSS/DSCN MKR DOCD: CPT | Performed by: NURSE PRACTITIONER

## 2024-12-09 PROCEDURE — G8399 PT W/DXA RESULTS DOCUMENT: HCPCS | Performed by: NURSE PRACTITIONER

## 2024-12-09 PROCEDURE — 1090F PRES/ABSN URINE INCON ASSESS: CPT | Performed by: NURSE PRACTITIONER

## 2024-12-09 PROCEDURE — 1159F MED LIST DOCD IN RCRD: CPT | Performed by: NURSE PRACTITIONER

## 2024-12-09 PROCEDURE — G8482 FLU IMMUNIZE ORDER/ADMIN: HCPCS | Performed by: NURSE PRACTITIONER

## 2024-12-09 PROCEDURE — 3074F SYST BP LT 130 MM HG: CPT | Performed by: NURSE PRACTITIONER

## 2024-12-09 PROCEDURE — 1036F TOBACCO NON-USER: CPT | Performed by: NURSE PRACTITIONER

## 2024-12-09 PROCEDURE — 99214 OFFICE O/P EST MOD 30 MIN: CPT | Performed by: NURSE PRACTITIONER

## 2024-12-09 PROCEDURE — G8420 CALC BMI NORM PARAMETERS: HCPCS | Performed by: NURSE PRACTITIONER

## 2024-12-09 RX ORDER — DONEPEZIL HYDROCHLORIDE 5 MG/1
5 TABLET, ORALLY DISINTEGRATING ORAL NIGHTLY
Qty: 30 TABLET | Refills: 3 | Status: SHIPPED | OUTPATIENT
Start: 2024-12-09

## 2024-12-09 RX ORDER — BUPROPION HYDROCHLORIDE 150 MG/1
150 TABLET ORAL EVERY MORNING
COMMUNITY
Start: 2024-11-15

## 2024-12-09 NOTE — PROGRESS NOTES
[facility]  NEUROLOGY  Gail Childs, MSN, APRN-FNP-C  [address], [place] [address]  Phone: [phone]  Fax: [phone]       Yamilka Taylor is a 79 y.o. left handed female     Patient follows for Parkinson's with tremors    She is accompanied by her  today    Patient referred for possible Parkinson's versus parkinsonism tremors.  Patient presents today with .  Onset of tremors began several years ago.  Patient has a history of psych and has been on multiple antidepressants throughout the years.   notes patient's tremors really became pronounced when she was started on olanzapine.  This medication was discontinued but tremors still persisted.  She has seen neurology at .  She has been tried on Sinemet, Mirapex, Requip and Austedo with no resolution of tremors.   notes that some of the medications ordered were expensive and not covered by insurance.  Her tremors are at rest and with activity.  Patient does have difficulty performing ADLs and needs the assistance of her .  She has difficulty feeding herself regardless of which hand she uses.   does all the cooking.  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  She notes seeing a orthopedic surgeon and having surgery on her right hand.  She noted improvement after the surgery with no difficulties.  She notes it was not a carpal tunnel release.  She exacerbated her right hand issues when she picked up her dog.   notes that she was taken off Abilify for a few weeks to see if that would help resolve tremors, but then she was restarted on it.  Patient did have a CT head completed at  recently which reported no acute abnormalities but showed moderate atrophy.  Patient notes a family history of late onset dementia with her mother.  Patient is to have left toe removal this Friday due to her toe being inflamed and hurting.  She has

## 2025-01-13 ENCOUNTER — TELEPHONE (OUTPATIENT)
Dept: PRIMARY CARE CLINIC | Age: 80
End: 2025-01-13

## 2025-01-13 DIAGNOSIS — F33.2 SEVERE EPISODE OF RECURRENT MAJOR DEPRESSIVE DISORDER, WITHOUT PSYCHOTIC FEATURES (HCC): ICD-10-CM

## 2025-01-13 DIAGNOSIS — I10 ESSENTIAL HYPERTENSION: ICD-10-CM

## 2025-01-13 RX ORDER — LISINOPRIL 5 MG/1
5 TABLET ORAL DAILY
Qty: 90 TABLET | Refills: 1 | Status: SHIPPED | OUTPATIENT
Start: 2025-01-13

## 2025-01-13 NOTE — TELEPHONE ENCOUNTER
Patient needs refills of her following medications sent to [facility] in [place]:    Lisinopril  5 MG  Amitriptyline 25 MG

## 2025-01-27 ENCOUNTER — OFFICE VISIT (OUTPATIENT)
Dept: PRIMARY CARE CLINIC | Age: 80
End: 2025-01-27
Payer: MEDICARE

## 2025-01-27 VITALS
DIASTOLIC BLOOD PRESSURE: 68 MMHG | SYSTOLIC BLOOD PRESSURE: 100 MMHG | OXYGEN SATURATION: 97 % | TEMPERATURE: 97.5 F | RESPIRATION RATE: 20 BRPM | HEIGHT: 65 IN | BODY MASS INDEX: 22.47 KG/M2 | HEART RATE: 100 BPM

## 2025-01-27 DIAGNOSIS — N18.2 STAGE 2 CHRONIC KIDNEY DISEASE: Primary | ICD-10-CM

## 2025-01-27 DIAGNOSIS — F33.2 SEVERE EPISODE OF RECURRENT MAJOR DEPRESSIVE DISORDER, WITHOUT PSYCHOTIC FEATURES (HCC): ICD-10-CM

## 2025-01-27 DIAGNOSIS — I10 ESSENTIAL HYPERTENSION: ICD-10-CM

## 2025-01-27 DIAGNOSIS — E78.2 MIXED HYPERLIPIDEMIA: ICD-10-CM

## 2025-01-27 DIAGNOSIS — M81.0 OSTEOPOROSIS WITHOUT CURRENT PATHOLOGICAL FRACTURE, UNSPECIFIED OSTEOPOROSIS TYPE: ICD-10-CM

## 2025-01-27 PROCEDURE — 3078F DIAST BP <80 MM HG: CPT | Performed by: STUDENT IN AN ORGANIZED HEALTH CARE EDUCATION/TRAINING PROGRAM

## 2025-01-27 PROCEDURE — G8399 PT W/DXA RESULTS DOCUMENT: HCPCS | Performed by: STUDENT IN AN ORGANIZED HEALTH CARE EDUCATION/TRAINING PROGRAM

## 2025-01-27 PROCEDURE — 1159F MED LIST DOCD IN RCRD: CPT | Performed by: STUDENT IN AN ORGANIZED HEALTH CARE EDUCATION/TRAINING PROGRAM

## 2025-01-27 PROCEDURE — 1126F AMNT PAIN NOTED NONE PRSNT: CPT | Performed by: STUDENT IN AN ORGANIZED HEALTH CARE EDUCATION/TRAINING PROGRAM

## 2025-01-27 PROCEDURE — 99214 OFFICE O/P EST MOD 30 MIN: CPT | Performed by: STUDENT IN AN ORGANIZED HEALTH CARE EDUCATION/TRAINING PROGRAM

## 2025-01-27 PROCEDURE — 1090F PRES/ABSN URINE INCON ASSESS: CPT | Performed by: STUDENT IN AN ORGANIZED HEALTH CARE EDUCATION/TRAINING PROGRAM

## 2025-01-27 PROCEDURE — 3074F SYST BP LT 130 MM HG: CPT | Performed by: STUDENT IN AN ORGANIZED HEALTH CARE EDUCATION/TRAINING PROGRAM

## 2025-01-27 PROCEDURE — G2211 COMPLEX E/M VISIT ADD ON: HCPCS | Performed by: STUDENT IN AN ORGANIZED HEALTH CARE EDUCATION/TRAINING PROGRAM

## 2025-01-27 PROCEDURE — G8427 DOCREV CUR MEDS BY ELIG CLIN: HCPCS | Performed by: STUDENT IN AN ORGANIZED HEALTH CARE EDUCATION/TRAINING PROGRAM

## 2025-01-27 PROCEDURE — 1036F TOBACCO NON-USER: CPT | Performed by: STUDENT IN AN ORGANIZED HEALTH CARE EDUCATION/TRAINING PROGRAM

## 2025-01-27 PROCEDURE — 1123F ACP DISCUSS/DSCN MKR DOCD: CPT | Performed by: STUDENT IN AN ORGANIZED HEALTH CARE EDUCATION/TRAINING PROGRAM

## 2025-01-27 PROCEDURE — G8420 CALC BMI NORM PARAMETERS: HCPCS | Performed by: STUDENT IN AN ORGANIZED HEALTH CARE EDUCATION/TRAINING PROGRAM

## 2025-01-27 RX ORDER — SIMVASTATIN 20 MG
20 TABLET ORAL NIGHTLY
Qty: 90 TABLET | Refills: 1 | Status: SHIPPED | OUTPATIENT
Start: 2025-01-27

## 2025-01-27 RX ORDER — LISINOPRIL 5 MG/1
5 TABLET ORAL DAILY
Qty: 90 TABLET | Refills: 1 | Status: SHIPPED | OUTPATIENT
Start: 2025-01-27

## 2025-01-27 RX ORDER — ERGOCALCIFEROL 1.25 MG/1
50000 CAPSULE, LIQUID FILLED ORAL
Qty: 7 CAPSULE | Refills: 1 | Status: SHIPPED | OUTPATIENT
Start: 2025-01-27

## 2025-01-27 SDOH — ECONOMIC STABILITY: FOOD INSECURITY: WITHIN THE PAST 12 MONTHS, YOU WORRIED THAT YOUR FOOD WOULD RUN OUT BEFORE YOU GOT MONEY TO BUY MORE.: NEVER TRUE

## 2025-01-27 SDOH — ECONOMIC STABILITY: FOOD INSECURITY: WITHIN THE PAST 12 MONTHS, THE FOOD YOU BOUGHT JUST DIDN'T LAST AND YOU DIDN'T HAVE MONEY TO GET MORE.: NEVER TRUE

## 2025-01-27 ASSESSMENT — PATIENT HEALTH QUESTIONNAIRE - PHQ9
10. IF YOU CHECKED OFF ANY PROBLEMS, HOW DIFFICULT HAVE THESE PROBLEMS MADE IT FOR YOU TO DO YOUR WORK, TAKE CARE OF THINGS AT HOME, OR GET ALONG WITH OTHER PEOPLE: NOT DIFFICULT AT ALL
SUM OF ALL RESPONSES TO PHQ QUESTIONS 1-9: 0
9. THOUGHTS THAT YOU WOULD BE BETTER OFF DEAD, OR OF HURTING YOURSELF: NOT AT ALL
8. MOVING OR SPEAKING SO SLOWLY THAT OTHER PEOPLE COULD HAVE NOTICED. OR THE OPPOSITE, BEING SO FIGETY OR RESTLESS THAT YOU HAVE BEEN MOVING AROUND A LOT MORE THAN USUAL: NOT AT ALL
SUM OF ALL RESPONSES TO PHQ QUESTIONS 1-9: 0
5. POOR APPETITE OR OVEREATING: NOT AT ALL
7. TROUBLE CONCENTRATING ON THINGS, SUCH AS READING THE NEWSPAPER OR WATCHING TELEVISION: NOT AT ALL
3. TROUBLE FALLING OR STAYING ASLEEP: NOT AT ALL
SUM OF ALL RESPONSES TO PHQ QUESTIONS 1-9: 0
6. FEELING BAD ABOUT YOURSELF - OR THAT YOU ARE A FAILURE OR HAVE LET YOURSELF OR YOUR FAMILY DOWN: NOT AT ALL
2. FEELING DOWN, DEPRESSED OR HOPELESS: NOT AT ALL
4. FEELING TIRED OR HAVING LITTLE ENERGY: NOT AT ALL
SUM OF ALL RESPONSES TO PHQ QUESTIONS 1-9: 0
SUM OF ALL RESPONSES TO PHQ9 QUESTIONS 1 & 2: 0
1. LITTLE INTEREST OR PLEASURE IN DOING THINGS: NOT AT ALL

## 2025-01-27 NOTE — PROGRESS NOTES
such as her blood pressure and cholesterol.  Patient has continued to take all of her medications as prescribed and denies any side effects.    Supplemental Information  She has a history of osteoporosis and had a nerve stimulator implanted in her back, which ceased functioning several years ago. She underwent hand surgery for a small bone fracture, which was initially successful. However, she re-injured her hand while lifting her dog, necessitating further surgical intervention. She was advised to consult a neurologist for this issue.    MEDICATIONS  Current: donepezil      Medication List:    Current Outpatient Medications   Medication Sig Dispense Refill    simvastatin (ZOCOR) 20 MG tablet Take 1 tablet by mouth nightly 90 tablet 1    vitamin D (ERGOCALCIFEROL) 1.25 MG (01978 UT) CAPS capsule Take 1 capsule by mouth every 14 days 7 capsule 1    lisinopril (PRINIVIL;ZESTRIL) 5 MG tablet Take 1 tablet by mouth daily 90 tablet 1    amitriptyline (ELAVIL) 25 MG tablet Take 1 tablet by mouth nightly 90 tablet 1    buPROPion (WELLBUTRIN XL) 150 MG extended release tablet Take 1 tablet by mouth every morning      donepezil (ARICEPT ODT) 5 MG disintegrating tablet Take 1 tablet by mouth nightly 30 tablet 3    zoledronic acid (RECLAST) 5 MG/100ML SOLN Infuse 100 mLs intravenously annually      famotidine (PEPCID) 20 MG tablet Take 1 tablet by mouth daily 90 tablet 1    senna-docusate (PERICOLACE) 8.6-50 MG per tablet Take 1 tablet by mouth daily as needed for Constipation 90 tablet 1    promethazine (PHENERGAN) 25 MG tablet Take 1 tablet by mouth 2 times daily as needed for Nausea 180 tablet 1    polyethylene glycol (MIRALAX) 17 GM/SCOOP POWD powder mix one scoop with 8 ounces of fluid and drink once a day if needed for constipation.      LORazepam (ATIVAN) 1 MG tablet Take 1 tablet by mouth 3 times daily as needed.      diclofenac sodium (VOLTAREN) 1 % GEL APPLY 4 GRAMS TO BACK EXTERNALLY 4 TIMES A DAY      sertraline

## 2025-02-11 DIAGNOSIS — I10 ESSENTIAL HYPERTENSION: ICD-10-CM

## 2025-02-11 DIAGNOSIS — N18.2 STAGE 2 CHRONIC KIDNEY DISEASE: ICD-10-CM

## 2025-02-11 DIAGNOSIS — E78.2 MIXED HYPERLIPIDEMIA: ICD-10-CM

## 2025-02-11 LAB
ANION GAP SERPL CALCULATED.3IONS-SCNC: 17 MMOL/L (ref 7–16)
BUN BLDV-MCNC: 16 MG/DL (ref 6–23)
CALCIUM SERPL-MCNC: 9.1 MG/DL (ref 8.6–10.2)
CHLORIDE BLD-SCNC: 100 MMOL/L (ref 98–107)
CHOLESTEROL, TOTAL: 170 MG/DL
CO2: 21 MMOL/L (ref 22–29)
CREAT SERPL-MCNC: 0.8 MG/DL (ref 0.5–1)
GFR, ESTIMATED: 76 ML/MIN/1.73M2
GLUCOSE BLD-MCNC: 97 MG/DL (ref 74–99)
HCT VFR BLD CALC: 38.8 % (ref 34–48)
HDLC SERPL-MCNC: 63 MG/DL
HEMOGLOBIN: 12.2 G/DL (ref 11.5–15.5)
LDL CHOLESTEROL: 85 MG/DL
MCH RBC QN AUTO: 29.3 PG (ref 26–35)
MCHC RBC AUTO-ENTMCNC: 31.4 G/DL (ref 32–34.5)
MCV RBC AUTO: 93.3 FL (ref 80–99.9)
PDW BLD-RTO: 14.6 % (ref 11.5–15)
PLATELET # BLD: 324 K/UL (ref 130–450)
PMV BLD AUTO: 10 FL (ref 7–12)
POTASSIUM SERPL-SCNC: 4.3 MMOL/L (ref 3.5–5)
RBC # BLD: 4.16 M/UL (ref 3.5–5.5)
SODIUM BLD-SCNC: 138 MMOL/L (ref 132–146)
TRIGL SERPL-MCNC: 112 MG/DL
VLDLC SERPL CALC-MCNC: 22 MG/DL
WBC # BLD: 6.2 K/UL (ref 4.5–11.5)

## 2025-02-24 ENCOUNTER — OFFICE VISIT (OUTPATIENT)
Dept: PRIMARY CARE CLINIC | Age: 80
End: 2025-02-24
Payer: MEDICARE

## 2025-02-24 VITALS
HEART RATE: 99 BPM | HEIGHT: 65 IN | DIASTOLIC BLOOD PRESSURE: 64 MMHG | BODY MASS INDEX: 19.83 KG/M2 | TEMPERATURE: 98.8 F | WEIGHT: 119 LBS | SYSTOLIC BLOOD PRESSURE: 120 MMHG | OXYGEN SATURATION: 92 %

## 2025-02-24 DIAGNOSIS — G21.19 PARKINSONISM DUE TO DRUG: ICD-10-CM

## 2025-02-24 DIAGNOSIS — F03.B0 MODERATE DEMENTIA WITHOUT BEHAVIORAL DISTURBANCE, PSYCHOTIC DISTURBANCE, MOOD DISTURBANCE, OR ANXIETY, UNSPECIFIED DEMENTIA TYPE (HCC): ICD-10-CM

## 2025-02-24 DIAGNOSIS — N39.0 RECURRENT UTI: ICD-10-CM

## 2025-02-24 DIAGNOSIS — R73.03 PRE-DIABETES: ICD-10-CM

## 2025-02-24 DIAGNOSIS — I10 ESSENTIAL HYPERTENSION: Primary | ICD-10-CM

## 2025-02-24 LAB — HBA1C MFR BLD: 5.8 %

## 2025-02-24 PROCEDURE — 1160F RVW MEDS BY RX/DR IN RCRD: CPT | Performed by: STUDENT IN AN ORGANIZED HEALTH CARE EDUCATION/TRAINING PROGRAM

## 2025-02-24 PROCEDURE — 3074F SYST BP LT 130 MM HG: CPT | Performed by: STUDENT IN AN ORGANIZED HEALTH CARE EDUCATION/TRAINING PROGRAM

## 2025-02-24 PROCEDURE — 83036 HEMOGLOBIN GLYCOSYLATED A1C: CPT | Performed by: STUDENT IN AN ORGANIZED HEALTH CARE EDUCATION/TRAINING PROGRAM

## 2025-02-24 PROCEDURE — G8399 PT W/DXA RESULTS DOCUMENT: HCPCS | Performed by: STUDENT IN AN ORGANIZED HEALTH CARE EDUCATION/TRAINING PROGRAM

## 2025-02-24 PROCEDURE — G2211 COMPLEX E/M VISIT ADD ON: HCPCS | Performed by: STUDENT IN AN ORGANIZED HEALTH CARE EDUCATION/TRAINING PROGRAM

## 2025-02-24 PROCEDURE — G8420 CALC BMI NORM PARAMETERS: HCPCS | Performed by: STUDENT IN AN ORGANIZED HEALTH CARE EDUCATION/TRAINING PROGRAM

## 2025-02-24 PROCEDURE — 1090F PRES/ABSN URINE INCON ASSESS: CPT | Performed by: STUDENT IN AN ORGANIZED HEALTH CARE EDUCATION/TRAINING PROGRAM

## 2025-02-24 PROCEDURE — G8427 DOCREV CUR MEDS BY ELIG CLIN: HCPCS | Performed by: STUDENT IN AN ORGANIZED HEALTH CARE EDUCATION/TRAINING PROGRAM

## 2025-02-24 PROCEDURE — 1123F ACP DISCUSS/DSCN MKR DOCD: CPT | Performed by: STUDENT IN AN ORGANIZED HEALTH CARE EDUCATION/TRAINING PROGRAM

## 2025-02-24 PROCEDURE — 1036F TOBACCO NON-USER: CPT | Performed by: STUDENT IN AN ORGANIZED HEALTH CARE EDUCATION/TRAINING PROGRAM

## 2025-02-24 PROCEDURE — 81002 URINALYSIS NONAUTO W/O SCOPE: CPT | Performed by: STUDENT IN AN ORGANIZED HEALTH CARE EDUCATION/TRAINING PROGRAM

## 2025-02-24 PROCEDURE — 3078F DIAST BP <80 MM HG: CPT | Performed by: STUDENT IN AN ORGANIZED HEALTH CARE EDUCATION/TRAINING PROGRAM

## 2025-02-24 PROCEDURE — 1159F MED LIST DOCD IN RCRD: CPT | Performed by: STUDENT IN AN ORGANIZED HEALTH CARE EDUCATION/TRAINING PROGRAM

## 2025-02-24 PROCEDURE — 99214 OFFICE O/P EST MOD 30 MIN: CPT | Performed by: STUDENT IN AN ORGANIZED HEALTH CARE EDUCATION/TRAINING PROGRAM

## 2025-02-24 NOTE — PROGRESS NOTES
ESTABLISHED PRIMARY CARE VISIT    25  Name: Yamilka Taylor   : 1945   Age: 79 y.o.  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     Chronic, worsened  Following with neuro  Failed multiple medications  Declines referral to [facility] or [facility]  Continue wheelchair to help with ADLs at home given not strong enough to use a cane or walker at this time  Discussed HH vs waiver- declines for now         Moderate dementia without behavioral disturbance, psychotic disturbance, mood disturbance, or anxiety, unspecified dementia type (HCC)     New diagnosis  Following with neurology  Started donepezil 5 mg nightly         Recurrent UTI     Culture pending         Relevant Orders    POCT Urinalysis no Micro (Completed)     Counseled patient regarding above diagnosis, including possible risks and complications, especially if left uncontrolled.  Counseled patient as appropriate and relevant regarding any possible side effects, risks, and alternatives to treatment; the patient verbalizes understanding, and is in agreement with the plan as detailed above.   All educational materials and instructions were discussed and included on the After Visit Summary.  All questions answered to the patient's satisfaction.  The patient was advised to call for any concerns prior to next appointment.    Return in about 4 months (around 2025).    This visit is inherently complex due to evaluation and management associated with medical care services that serve as the continuing focal point for health/medical care services that are part of ongoing care related to a patient's single, serious condition or a complex

## 2025-02-25 ENCOUNTER — TELEPHONE (OUTPATIENT)
Dept: PRIMARY CARE CLINIC | Age: 80
End: 2025-02-25

## 2025-02-25 DIAGNOSIS — R35.0 FREQUENCY OF URINATION: Primary | ICD-10-CM

## 2025-02-25 LAB
AMORPHOUS: PRESENT
BACTERIA: ABNORMAL
BILIRUBIN, POC: NORMAL
BILIRUBIN, URINE: NEGATIVE
BLOOD URINE, POC: NORMAL
CLARITY, POC: NORMAL
COLOR, POC: NORMAL
COLOR, UA: YELLOW
CRYSTALS, UA: ABNORMAL /HPF
EPITHELIAL CELLS, UA: ABNORMAL /HPF
GLUCOSE URINE, POC: NEGATIVE MG/DL
GLUCOSE URINE: NEGATIVE MG/DL
KETONES, POC: NEGATIVE MG/DL
KETONES, URINE: ABNORMAL MG/DL
LEUKOCYTE EST, POC: NEGATIVE
LEUKOCYTE ESTERASE, URINE: ABNORMAL
NITRITE, POC: POSITIVE
NITRITE, URINE: POSITIVE
PH, POC: 5.5
PH, URINE: 5.5 (ref 5–8)
PROTEIN UA: ABNORMAL MG/DL
PROTEIN, POC: NORMAL MG/DL
RBC UA: ABNORMAL /HPF
SPECIFIC GRAVITY UA: >1.03 (ref 1–1.03)
SPECIFIC GRAVITY, POC: >=1.03
TURBIDITY: ABNORMAL
URINE HGB: ABNORMAL
UROBILINOGEN, POC: NORMAL MG/DL
UROBILINOGEN, URINE: 0.2 EU/DL (ref 0–1)
WBC UA: ABNORMAL /HPF

## 2025-02-25 NOTE — TELEPHONE ENCOUNTER
dropped off the urine for the poct from yesterday. Results are in chart. Do you want a culture sent?

## 2025-02-27 DIAGNOSIS — N39.0 RECURRENT UTI: Primary | ICD-10-CM

## 2025-02-27 LAB
CULTURE: ABNORMAL
CULTURE: ABNORMAL
SPECIMEN DESCRIPTION: ABNORMAL

## 2025-02-27 RX ORDER — AMOXICILLIN 875 MG/1
875 TABLET, COATED ORAL 2 TIMES DAILY
Qty: 14 TABLET | Refills: 0 | Status: SHIPPED | OUTPATIENT
Start: 2025-02-27 | End: 2025-03-06

## 2025-03-04 NOTE — ASSESSMENT & PLAN NOTE
Chronic, worsened  Following with neuro  Failed multiple medications  Declines referral to [facility] or [facility]  Continue wheelchair to help with ADLs at home given not strong enough to use a cane or walker at this time  Discussed HH vs waiver- declines for now

## 2025-06-09 ENCOUNTER — SCHEDULED TELEPHONE ENCOUNTER (OUTPATIENT)
Dept: NEUROLOGY | Age: 80
End: 2025-06-09
Payer: MEDICARE

## 2025-06-09 DIAGNOSIS — F03.B0 MODERATE DEMENTIA WITHOUT BEHAVIORAL DISTURBANCE, PSYCHOTIC DISTURBANCE, MOOD DISTURBANCE, OR ANXIETY, UNSPECIFIED DEMENTIA TYPE (HCC): Primary | ICD-10-CM

## 2025-06-09 DIAGNOSIS — G21.19 OTHER DRUG-INDUCED SECONDARY PARKINSONISM: ICD-10-CM

## 2025-06-09 PROCEDURE — 99447 NTRPROF PH1/NTRNET/EHR 11-20: CPT | Performed by: NURSE PRACTITIONER

## 2025-06-09 RX ORDER — MEMANTINE HYDROCHLORIDE 5 MG/1
5 TABLET ORAL DAILY
Qty: 30 TABLET | Refills: 2 | Status: SHIPPED | OUTPATIENT
Start: 2025-06-09

## 2025-06-09 NOTE — PROGRESS NOTES
[facility] NEUROLOGY   Gail Childs, MSN, APRN-CNP, [facility]     [address], [address]              [place] [address]      [phone]      Office Follow Up--Phone Visit    Yamilka Taylor is a 79 y.o. left handed female     The patient and/or health care decision maker is aware that that he/she may receive a bill for this telephone service, depending on his/her insurance coverage, and has provided verbal consent to proceed: Yes     I affirm this is a Patient Initiated Episode with an Established Patient who has not had a related appointment within my department in the past 7 days or scheduled within the next 24 hours.     The patient's identity was verified at the start of the call. Others involved in call:     Total Time: minutes: 11-20 minutes    Note: not billable if this call serves to triage the patient into an appointment for the relevant concern    HPI:     We are following for Parkinson's versus parkinsonism tremors.  Patient presents today with .  Onset of tremors began several years ago.  Patient has a history of psych and has been on multiple antidepressants throughout the years.   notes patient's tremors really became pronounced when she was started on olanzapine.  This medication was discontinued but tremors still persisted.  She has seen neurology at [facility].  She has been tried on Sinemet, Mirapex, Requip and Austedo with no resolution of tremors.   notes that some of the medications ordered were expensive and not covered by insurance.  Her tremors are at rest and with activity.  Patient does have difficulty performing ADLs and needs the assistance of her .  She has difficulty feeding herself regardless of which hand she uses.   does all the cooking.  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  She notes seeing a orthopedic surgeon and having surgery on her right hand.  She noted improvement after the

## 2025-06-20 ENCOUNTER — OFFICE VISIT (OUTPATIENT)
Dept: PRIMARY CARE CLINIC | Age: 80
End: 2025-06-20
Payer: MEDICARE

## 2025-06-20 VITALS
DIASTOLIC BLOOD PRESSURE: 50 MMHG | HEART RATE: 92 BPM | WEIGHT: 130 LBS | HEIGHT: 65 IN | OXYGEN SATURATION: 97 % | BODY MASS INDEX: 21.66 KG/M2 | RESPIRATION RATE: 20 BRPM | TEMPERATURE: 98.8 F | SYSTOLIC BLOOD PRESSURE: 110 MMHG

## 2025-06-20 VITALS
TEMPERATURE: 98.8 F | HEIGHT: 65 IN | SYSTOLIC BLOOD PRESSURE: 110 MMHG | BODY MASS INDEX: 21.66 KG/M2 | DIASTOLIC BLOOD PRESSURE: 50 MMHG | HEART RATE: 92 BPM | RESPIRATION RATE: 20 BRPM | OXYGEN SATURATION: 97 % | WEIGHT: 130 LBS

## 2025-06-20 DIAGNOSIS — M81.0 OSTEOPOROSIS WITHOUT CURRENT PATHOLOGICAL FRACTURE, UNSPECIFIED OSTEOPOROSIS TYPE: ICD-10-CM

## 2025-06-20 DIAGNOSIS — R29.6 RECURRENT FALLS: ICD-10-CM

## 2025-06-20 DIAGNOSIS — F33.2 SEVERE EPISODE OF RECURRENT MAJOR DEPRESSIVE DISORDER, WITHOUT PSYCHOTIC FEATURES (HCC): ICD-10-CM

## 2025-06-20 DIAGNOSIS — G21.19 PARKINSONISM DUE TO DRUG: ICD-10-CM

## 2025-06-20 DIAGNOSIS — S59.901A INJURY OF RIGHT ELBOW, INITIAL ENCOUNTER: ICD-10-CM

## 2025-06-20 DIAGNOSIS — F03.B0 MODERATE DEMENTIA WITHOUT BEHAVIORAL DISTURBANCE, PSYCHOTIC DISTURBANCE, MOOD DISTURBANCE, OR ANXIETY, UNSPECIFIED DEMENTIA TYPE (HCC): ICD-10-CM

## 2025-06-20 DIAGNOSIS — E78.2 MIXED HYPERLIPIDEMIA: ICD-10-CM

## 2025-06-20 DIAGNOSIS — I10 ESSENTIAL HYPERTENSION: Primary | ICD-10-CM

## 2025-06-20 DIAGNOSIS — Z00.00 MEDICARE ANNUAL WELLNESS VISIT, SUBSEQUENT: Primary | ICD-10-CM

## 2025-06-20 DIAGNOSIS — Z59.71 INSUFFICIENT HEALTH INSURANCE COVERAGE: ICD-10-CM

## 2025-06-20 PROBLEM — N95.2 POST-MENOPAUSAL ATROPHIC VAGINITIS: Status: ACTIVE | Noted: 2025-06-20

## 2025-06-20 PROCEDURE — 1090F PRES/ABSN URINE INCON ASSESS: CPT | Performed by: STUDENT IN AN ORGANIZED HEALTH CARE EDUCATION/TRAINING PROGRAM

## 2025-06-20 PROCEDURE — 1036F TOBACCO NON-USER: CPT | Performed by: STUDENT IN AN ORGANIZED HEALTH CARE EDUCATION/TRAINING PROGRAM

## 2025-06-20 PROCEDURE — 1160F RVW MEDS BY RX/DR IN RCRD: CPT | Performed by: STUDENT IN AN ORGANIZED HEALTH CARE EDUCATION/TRAINING PROGRAM

## 2025-06-20 PROCEDURE — 99214 OFFICE O/P EST MOD 30 MIN: CPT | Performed by: STUDENT IN AN ORGANIZED HEALTH CARE EDUCATION/TRAINING PROGRAM

## 2025-06-20 PROCEDURE — G8427 DOCREV CUR MEDS BY ELIG CLIN: HCPCS | Performed by: STUDENT IN AN ORGANIZED HEALTH CARE EDUCATION/TRAINING PROGRAM

## 2025-06-20 PROCEDURE — 3074F SYST BP LT 130 MM HG: CPT | Performed by: STUDENT IN AN ORGANIZED HEALTH CARE EDUCATION/TRAINING PROGRAM

## 2025-06-20 PROCEDURE — G8420 CALC BMI NORM PARAMETERS: HCPCS | Performed by: STUDENT IN AN ORGANIZED HEALTH CARE EDUCATION/TRAINING PROGRAM

## 2025-06-20 PROCEDURE — 1159F MED LIST DOCD IN RCRD: CPT | Performed by: STUDENT IN AN ORGANIZED HEALTH CARE EDUCATION/TRAINING PROGRAM

## 2025-06-20 PROCEDURE — 3078F DIAST BP <80 MM HG: CPT | Performed by: STUDENT IN AN ORGANIZED HEALTH CARE EDUCATION/TRAINING PROGRAM

## 2025-06-20 PROCEDURE — G0439 PPPS, SUBSEQ VISIT: HCPCS | Performed by: STUDENT IN AN ORGANIZED HEALTH CARE EDUCATION/TRAINING PROGRAM

## 2025-06-20 PROCEDURE — G2211 COMPLEX E/M VISIT ADD ON: HCPCS | Performed by: STUDENT IN AN ORGANIZED HEALTH CARE EDUCATION/TRAINING PROGRAM

## 2025-06-20 PROCEDURE — 1123F ACP DISCUSS/DSCN MKR DOCD: CPT | Performed by: STUDENT IN AN ORGANIZED HEALTH CARE EDUCATION/TRAINING PROGRAM

## 2025-06-20 PROCEDURE — G8399 PT W/DXA RESULTS DOCUMENT: HCPCS | Performed by: STUDENT IN AN ORGANIZED HEALTH CARE EDUCATION/TRAINING PROGRAM

## 2025-06-20 RX ORDER — SIMVASTATIN 20 MG
20 TABLET ORAL NIGHTLY
Qty: 90 TABLET | Refills: 1 | Status: SHIPPED | OUTPATIENT
Start: 2025-06-20

## 2025-06-20 RX ORDER — SIMVASTATIN 20 MG
10 TABLET ORAL NIGHTLY
Status: CANCELLED | OUTPATIENT
Start: 2025-06-20

## 2025-06-20 RX ORDER — ERGOCALCIFEROL 1.25 MG/1
50000 CAPSULE, LIQUID FILLED ORAL
Qty: 7 CAPSULE | Refills: 1 | Status: SHIPPED | OUTPATIENT
Start: 2025-06-20

## 2025-06-20 RX ORDER — LISINOPRIL 5 MG/1
5 TABLET ORAL DAILY
Qty: 90 TABLET | Refills: 1 | Status: CANCELLED | OUTPATIENT
Start: 2025-06-20

## 2025-06-20 ASSESSMENT — PATIENT HEALTH QUESTIONNAIRE - PHQ9
3. TROUBLE FALLING OR STAYING ASLEEP: NOT AT ALL
SUM OF ALL RESPONSES TO PHQ QUESTIONS 1-9: 19
SUM OF ALL RESPONSES TO PHQ QUESTIONS 1-9: 17
6. FEELING BAD ABOUT YOURSELF - OR THAT YOU ARE A FAILURE OR HAVE LET YOURSELF OR YOUR FAMILY DOWN: NEARLY EVERY DAY
SUM OF ALL RESPONSES TO PHQ QUESTIONS 1-9: 19
SUM OF ALL RESPONSES TO PHQ QUESTIONS 1-9: 19
7. TROUBLE CONCENTRATING ON THINGS, SUCH AS READING THE NEWSPAPER OR WATCHING TELEVISION: NEARLY EVERY DAY
1. LITTLE INTEREST OR PLEASURE IN DOING THINGS: NEARLY EVERY DAY
2. FEELING DOWN, DEPRESSED OR HOPELESS: MORE THAN HALF THE DAYS
5. POOR APPETITE OR OVEREATING: NOT AT ALL
4. FEELING TIRED OR HAVING LITTLE ENERGY: NEARLY EVERY DAY
9. THOUGHTS THAT YOU WOULD BE BETTER OFF DEAD, OR OF HURTING YOURSELF: MORE THAN HALF THE DAYS
8. MOVING OR SPEAKING SO SLOWLY THAT OTHER PEOPLE COULD HAVE NOTICED. OR THE OPPOSITE, BEING SO FIGETY OR RESTLESS THAT YOU HAVE BEEN MOVING AROUND A LOT MORE THAN USUAL: NEARLY EVERY DAY
10. IF YOU CHECKED OFF ANY PROBLEMS, HOW DIFFICULT HAVE THESE PROBLEMS MADE IT FOR YOU TO DO YOUR WORK, TAKE CARE OF THINGS AT HOME, OR GET ALONG WITH OTHER PEOPLE: VERY DIFFICULT

## 2025-06-20 ASSESSMENT — COLUMBIA-SUICIDE SEVERITY RATING SCALE - C-SSRS
4. HAVE YOU HAD THESE THOUGHTS AND HAD SOME INTENTION OF ACTING ON THEM?: NO
6. HAVE YOU EVER DONE ANYTHING, STARTED TO DO ANYTHING, OR PREPARED TO DO ANYTHING TO END YOUR LIFE?: NO
5. HAVE YOU STARTED TO WORK OUT OR WORKED OUT THE DETAILS OF HOW TO KILL YOURSELF? DO YOU INTEND TO CARRY OUT THIS PLAN?: NO
2. HAVE YOU ACTUALLY HAD ANY THOUGHTS OF KILLING YOURSELF?: YES
1. WITHIN THE PAST MONTH, HAVE YOU WISHED YOU WERE DEAD OR WISHED YOU COULD GO TO SLEEP AND NOT WAKE UP?: YES
3. HAVE YOU BEEN THINKING ABOUT HOW YOU MIGHT KILL YOURSELF?: YES

## 2025-06-20 ASSESSMENT — LIFESTYLE VARIABLES: HOW OFTEN DO YOU HAVE A DRINK CONTAINING ALCOHOL: NEVER

## 2025-06-20 NOTE — PATIENT INSTRUCTIONS
Vision Tests  What are vision tests?     The four most common vision tests are visual acuity tests, refraction, visual field tests, and color vision tests.  Visual acuity (sharpness) tests  These tests are used:  To see if you need glasses or contact lenses.  To monitor an eye problem.  To check an eye injury.  Visual acuity tests are done as part of routine exams. You may also have this test when you get your 's license or apply for some types of jobs.  Visual field tests  These tests are used:  To check for vision loss in any area of your range of vision.  To screen for certain eye diseases.  To look for nerve damage after a stroke, head injury, or other problem that could reduce blood flow to the brain.  Refraction and color tests  A refraction test is done to find the right prescription for glasses and contact lenses.  A color vision test is done to check for color blindness.  Color vision is often tested as part of a routine exam. You may also have this test when you apply for a job where recognizing different colors is important, such as , electronics, or the .  How are vision tests done?  Visual acuity test   You cover one eye at a time.  You read aloud from a wall chart across the room.  You read aloud from a small card that you hold in your hand.  Refraction   You look into a special device.  The device puts lenses of different strengths in front of each eye to see how strong your glasses or contact lenses need to be.  Visual field tests   Your doctor may have you look through special machines.  Or your doctor may simply have you stare straight ahead while they move a finger into and out of your field of vision.  Color vision test   You look at pieces of printed test patterns in various colors. You say what number or symbol you see.  Your doctor may have you trace the number or symbol using a pointer.  How do these tests feel?  There is very little chance of having a problem from

## 2025-06-20 NOTE — PROGRESS NOTES
ARIPiprazole (ABILIFY) 5 MG tablet TAKE ONE TABLET BY MOUTH EVERY DAY Yes Oneida Rodriguez MD   mirtazapine (REMERON) 30 MG tablet TAKE 1 TABLET BY MOUTH AT BEDTIME Yes ProviderOneida MD   Handicap Placard MISC by Does not apply route Duration 5 years Yes Anna Marie Sung, DO   Multiple Vitamins-Minerals (MULTIVITAMIN ADULT) TABS Take by mouth daily Yes ProviderOneida MD   aspirin 81 MG EC tablet  Yes ProviderOneida MD   HYDROcodone-acetaminophen (NORCO)  MG per tablet Take 1 tablet by mouth every 8 hours as needed for Pain. Yes Oneida Rodriguez MD   amitriptyline (ELAVIL) 25 MG tablet Take 1 tablet by mouth nightly  Robert Shaw MD   vitamin D (ERGOCALCIFEROL) 1.25 MG (05527 UT) CAPS capsule Take 1 capsule by mouth every 14 days  Robert Shaw MD   simvastatin (ZOCOR) 20 MG tablet Take 1 tablet by mouth nightly  Robert Shaw MD       [facility] (Including outside providers/suppliers regularly involved in providing care):   Patient Care Team:  Robert Shaw MD as PCP - General (Family Medicine)  Robert Shaw MD as PCP - Empaneled Provider     Recommendations for Preventive Services Due: see orders and patient instructions/AVS.  Recommended screening schedule for the next 5-10 years is provided to the patient in written form: see Patient Instructions/AVS.     Reviewed and updated this visit:  Tobacco  Allergies  Meds

## 2025-06-20 NOTE — PROGRESS NOTES
psychiatry for medications         Relevant Medications    amitriptyline (ELAVIL) 25 MG tablet     Other Visit Diagnoses         Injury of right elbow, initial encounter        Relevant Orders    XR ELBOW RIGHT (2 VIEWS)      Insufficient health insurance coverage        Relevant Orders    [facility] Referral to Social Work (Primary Care Only)          Counseled patient regarding above diagnosis, including possible risks and complications, especially if left uncontrolled.  Counseled patient as appropriate and relevant regarding any possible side effects, risks, and alternatives to treatment; the patient verbalizes understanding, and is in agreement with the plan as detailed above.   All educational materials and instructions were discussed and included on the After Visit Summary.  All questions answered to the patient's satisfaction.  The patient was advised to call for any concerns prior to next appointment.    Return in about 3 months (around 9/20/2025).    This visit is inherently complex due to evaluation and management associated with medical care services that serve as the continuing focal point for health/medical care services that are part of ongoing care related to a patient's single, serious condition or a complex condition.     Subjective:     Chief Complaint   Patient presents with    Annual Exam     Patient returns for follow-up   reports needing help at home  Cannot do anything around house or outside because patient gets inpatient and will not wait for him to do things  Tries to do things on her own and falls  Fell onto her right elbow last week trying to get up    Review of Systems   Constitutional:  Positive for fatigue.   Respiratory:  Negative for shortness of breath.    Cardiovascular:  Negative for chest pain.   Gastrointestinal:  Positive for constipation. Negative for blood in stool and diarrhea.   Genitourinary:  Negative for difficulty urinating, dysuria, frequency and urgency.

## 2025-06-27 ENCOUNTER — TELEPHONE (OUTPATIENT)
Dept: FAMILY MEDICINE CLINIC | Age: 80
End: 2025-06-27

## 2025-06-30 ENCOUNTER — TELEPHONE (OUTPATIENT)
Dept: FAMILY MEDICINE CLINIC | Age: 80
End: 2025-06-30

## 2025-07-08 PROBLEM — R29.6 RECURRENT FALLS: Status: ACTIVE | Noted: 2025-07-08

## 2025-07-08 ASSESSMENT — ENCOUNTER SYMPTOMS
BLOOD IN STOOL: 0
DIARRHEA: 0
CONSTIPATION: 1
SHORTNESS OF BREATH: 0

## 2025-07-08 NOTE — ASSESSMENT & PLAN NOTE
Chronic, stable  Following with neurology  Side effects with donepezil  Started memantine 5 mg daily

## 2025-07-08 NOTE — ASSESSMENT & PLAN NOTE
Chronic, worsened  Following with neuro  Failed multiple medications  Declines referral to [facility] or [facility]  Continue wheelchair to help with ADLs at home given not strong enough to use a cane or walker at this time  Referral to J[facility] and also to [facility] to see if patient qualifies for waiver despite lack of Medicaid

## 2025-07-14 ENCOUNTER — SCHEDULED TELEPHONE ENCOUNTER (OUTPATIENT)
Dept: NEUROLOGY | Age: 80
End: 2025-07-14
Payer: MEDICARE

## 2025-07-14 DIAGNOSIS — G21.19 OTHER DRUG-INDUCED SECONDARY PARKINSONISM: Primary | ICD-10-CM

## 2025-07-14 DIAGNOSIS — F03.B0 MODERATE DEMENTIA WITHOUT BEHAVIORAL DISTURBANCE, PSYCHOTIC DISTURBANCE, MOOD DISTURBANCE, OR ANXIETY, UNSPECIFIED DEMENTIA TYPE (HCC): ICD-10-CM

## 2025-07-14 PROCEDURE — 99446 NTRPROF PH1/NTRNET/EHR 5-10: CPT | Performed by: NURSE PRACTITIONER

## 2025-07-14 RX ORDER — MEMANTINE HYDROCHLORIDE 5 MG/1
5 TABLET ORAL DAILY
Qty: 90 TABLET | Refills: 3 | Status: SHIPPED | OUTPATIENT
Start: 2025-07-14

## 2025-07-14 NOTE — PROGRESS NOTES
[facility] NEUROLOGY   Gail Childs, MSN, APRN-CNP, [facility]     [address], [address]              [place] [address]      [phone]      Office Follow Up--Phone Visit    Yamilka Taylor is a 79 y.o. left handed female     The patient and/or health care decision maker is aware that that he/she may receive a bill for this telephone service, depending on his/her insurance coverage, and has provided verbal consent to proceed: Yes     I affirm this is a Patient Initiated Episode with an Established Patient who has not had a related appointment within my department in the past 7 days or scheduled within the next 24 hours.     The patient's identity was verified at the start of the call. Others involved in call:     Total Time: 5-10 minutes    Note: not billable if this call serves to triage the patient into an appointment for the relevant concern    HPI:     We are following for Parkinson's versus parkinsonism tremors.  Patient presents today with .  Onset of tremors began several years ago.  Patient has a history of psych and has been on multiple antidepressants throughout the years.   notes patient's tremors really became pronounced when she was started on olanzapine.  This medication was discontinued but tremors still persisted.  She has seen neurology at [facility].  She has been tried on Sinemet, Mirapex, Requip and Austedo with no resolution of tremors.   notes that some of the medications ordered were expensive and not covered by insurance.  Her tremors are at rest and with activity.  Patient does have difficulty performing ADLs and needs the assistance of her .  She has difficulty feeding herself regardless of which hand she uses.   does all the cooking.  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  She notes seeing a orthopedic surgeon and having surgery on her right hand.  She noted improvement after the surgery with